# Patient Record
Sex: MALE | Race: BLACK OR AFRICAN AMERICAN | NOT HISPANIC OR LATINO | Employment: OTHER | URBAN - METROPOLITAN AREA
[De-identification: names, ages, dates, MRNs, and addresses within clinical notes are randomized per-mention and may not be internally consistent; named-entity substitution may affect disease eponyms.]

---

## 2017-08-25 ENCOUNTER — TELEPHONE (OUTPATIENT)
Dept: RHEUMATOLOGY | Facility: CLINIC | Age: 57
End: 2017-08-25

## 2018-01-15 ENCOUNTER — TELEPHONE (OUTPATIENT)
Dept: RHEUMATOLOGY | Facility: CLINIC | Age: 58
End: 2018-01-15

## 2018-01-15 ENCOUNTER — OFFICE VISIT (OUTPATIENT)
Dept: RHEUMATOLOGY | Facility: CLINIC | Age: 58
End: 2018-01-15
Payer: MEDICARE

## 2018-01-15 VITALS
HEIGHT: 68 IN | WEIGHT: 288.81 LBS | RESPIRATION RATE: 15 BRPM | SYSTOLIC BLOOD PRESSURE: 125 MMHG | BODY MASS INDEX: 43.77 KG/M2 | DIASTOLIC BLOOD PRESSURE: 88 MMHG | HEART RATE: 62 BPM

## 2018-01-15 DIAGNOSIS — Z11.4 SCREENING FOR HIV (HUMAN IMMUNODEFICIENCY VIRUS): ICD-10-CM

## 2018-01-15 DIAGNOSIS — B19.20 HEPATITIS C VIRUS INFECTION WITHOUT HEPATIC COMA, UNSPECIFIED CHRONICITY: ICD-10-CM

## 2018-01-15 DIAGNOSIS — M05.742 RHEUMATOID ARTHRITIS INVOLVING BOTH HANDS WITH POSITIVE RHEUMATOID FACTOR: Primary | ICD-10-CM

## 2018-01-15 DIAGNOSIS — R53.83 FATIGUE, UNSPECIFIED TYPE: ICD-10-CM

## 2018-01-15 DIAGNOSIS — M05.741 RHEUMATOID ARTHRITIS INVOLVING BOTH HANDS WITH POSITIVE RHEUMATOID FACTOR: Primary | ICD-10-CM

## 2018-01-15 DIAGNOSIS — M17.0 PRIMARY OSTEOARTHRITIS OF BOTH KNEES: ICD-10-CM

## 2018-01-15 DIAGNOSIS — M79.10 MYALGIA: ICD-10-CM

## 2018-01-15 DIAGNOSIS — M89.9 DISORDER OF BONE AND CARTILAGE: ICD-10-CM

## 2018-01-15 DIAGNOSIS — M94.9 DISORDER OF BONE AND CARTILAGE: ICD-10-CM

## 2018-01-15 DIAGNOSIS — A53.0 POSITIVE RPR TEST: ICD-10-CM

## 2018-01-15 PROCEDURE — 99205 OFFICE O/P NEW HI 60 MIN: CPT | Mod: S$PBB,,, | Performed by: INTERNAL MEDICINE

## 2018-01-15 PROCEDURE — 99999 PR PBB SHADOW E&M-EST. PATIENT-LVL IV: CPT | Mod: PBBFAC,,, | Performed by: INTERNAL MEDICINE

## 2018-01-15 PROCEDURE — 99214 OFFICE O/P EST MOD 30 MIN: CPT | Mod: PBBFAC,PO | Performed by: INTERNAL MEDICINE

## 2018-01-15 RX ORDER — HYDROCHLOROTHIAZIDE 25 MG/1
25 TABLET ORAL
COMMUNITY
End: 2018-08-24 | Stop reason: SDUPTHER

## 2018-01-15 RX ORDER — FOLIC ACID 1 MG/1
1 TABLET ORAL
COMMUNITY
Start: 2017-12-04 | End: 2018-01-15 | Stop reason: SDUPTHER

## 2018-01-15 RX ORDER — FOLIC ACID 1 MG/1
1 TABLET ORAL DAILY
Qty: 90 TABLET | Refills: 3 | Status: SHIPPED | OUTPATIENT
Start: 2018-01-15 | End: 2018-08-24 | Stop reason: SDUPTHER

## 2018-01-15 RX ORDER — PREDNISONE 1 MG/1
2 TABLET ORAL DAILY
Qty: 180 TABLET | Refills: 0 | Status: SHIPPED | OUTPATIENT
Start: 2018-01-15 | End: 2018-01-25

## 2018-01-15 RX ORDER — FUROSEMIDE 20 MG/1
20 TABLET ORAL
COMMUNITY

## 2018-01-15 RX ORDER — OMEPRAZOLE 20 MG/1
20 CAPSULE, DELAYED RELEASE ORAL
COMMUNITY
Start: 2017-12-04 | End: 2018-03-06 | Stop reason: SDUPTHER

## 2018-01-15 RX ORDER — SILDENAFIL 50 MG/1
50 TABLET, FILM COATED ORAL
COMMUNITY
Start: 2017-12-04

## 2018-01-15 RX ORDER — AMLODIPINE BESYLATE AND ATORVASTATIN CALCIUM 10; 40 MG/1; MG/1
1 TABLET, FILM COATED ORAL
COMMUNITY
Start: 2017-10-24 | End: 2018-10-24

## 2018-01-15 RX ORDER — AMOXICILLIN 500 MG
2 CAPSULE ORAL DAILY
COMMUNITY

## 2018-01-15 ASSESSMENT — ROUTINE ASSESSMENT OF PATIENT INDEX DATA (RAPID3)
PAIN SCORE: 4
PATIENT GLOBAL ASSESSMENT SCORE: 6
TOTAL RAPID3 SCORE: 4.55
PSYCHOLOGICAL DISTRESS SCORE: 3.3
MDHAQ FUNCTION SCORE: 1.1

## 2018-01-15 NOTE — TELEPHONE ENCOUNTER
Pt misunderstood when he was to get labs. Pt went home instead of having them done. Will call back later this week to reschedule, per pt request.

## 2018-01-15 NOTE — PROGRESS NOTES
"Subjective:       Patient ID: Massimo Funez is a 57 y.o. male.    Chief Complaint: Disease Management (RA)    HPI 58 yo male with Hepatitis C - in remission is seen in consultation for RA. RA was diagnosed in 2002 by rheumatologist. Last saw Dr Richardson in April 2017. Currently, on MTX 15mg a week +folic acid 1mg a day and prednisone 2mg a day. RA pain is stable. Today, he c/o pain in right knee, worse with activity, better with rest. No joint effusion.  Early morning stiffness lasts for 1 hr   He denies fever, weight loss, dry eyes or mouth, photosensitivity, rash, ulcer, raynaud's phenomenon, alopecia, dysphagia, diarrhea or blood in the stools  +Current occasional smoking   Review of Systems   Constitutional: Positive for fatigue. Negative for fever.   HENT: Negative for ear discharge and ear pain.    Eyes: Negative for pain and redness.   Respiratory: Negative for cough and shortness of breath.    Cardiovascular: Negative for chest pain and palpitations.   Gastrointestinal: Negative for abdominal distention and abdominal pain.   Genitourinary: Negative for genital sores and hematuria.   Musculoskeletal: Positive for myalgias.   Neurological: Negative for tremors and seizures.   Psychiatric/Behavioral: Negative for agitation and hallucinations.         Objective:   /88   Pulse 62   Resp 15   Ht 5' 8" (1.727 m)   Wt 131 kg (288 lb 12.8 oz)   BMI 43.91 kg/m²      Physical Exam   Nursing note and vitals reviewed.  Constitutional: He is oriented to person, place, and time and well-developed, well-nourished, and in no distress.   HENT:   Head: Normocephalic and atraumatic.   Eyes: Conjunctivae and EOM are normal. Pupils are equal, round, and reactive to light.   Neck: Normal range of motion. Neck supple. No thyromegaly present.   Cardiovascular: Normal rate and regular rhythm.  Exam reveals no friction rub.    Pulmonary/Chest: Effort normal and breath sounds normal.   Abdominal: Soft. Bowel sounds " are normal. He exhibits no mass.       Right Side Rheumatological Exam     Examination finds the shoulder, elbow, wrist, 1st PIP, 1st MCP, 2nd PIP, 2nd MCP, 3rd PIP, 3rd MCP, 4th PIP, 4th MCP, 5th PIP and 5th MCP normal.    The patient has an enlarged knee    Left Side Rheumatological Exam     Examination finds the shoulder, elbow, wrist, 1st PIP, 1st MCP, 2nd PIP, 2nd MCP, 3rd PIP, 3rd MCP, 4th PIP, 4th MCP, 5th PIP and 5th MCP normal.    The patient has an enlarged knee.      Neurological: He is alert and oriented to person, place, and time. He exhibits normal muscle tone.   Skin: Skin is warm and dry.     Psychiatric: Memory and affect normal.   Musculoskeletal: He exhibits no edema.   Neck with intact range of motion in all directions   Bilateral shoulders with intact ROM   Elbows without any swelling or tenderness  No swelling or tenderness in wrists, mcps, pips and dips   Bilateral hips with external rotation 25° and  internal rotation 15°   Bilateral knee crepitus  Both ankles and feet nontender, without synovitis           Obtain records from Dr Richardson  Assessment:   Seropositive rheumatoid arthritis-T FANNY 0 SJC 0  Long-term use of methotrexate  Bilateral knee OA  Myalgia rule out myositis-  Fatigue-rule out disorders of bone and cartilage/vitamin D deficiency  Plan:   Check MACIEJ, SSA, RF, CCP, ESR, CRP, Arthritis survey  Check CPK, aldolase, 25 hydroxy Vit D   Continue methotrexate 15 mg a week  Get pre-DMARD's and baseline chest x-ray and HIV for refills   Continue folic acid 1 mg daily  Wean prednisone  to 1 mg daily   Discussed quadriceps strengthening exercises  Return to clinic in 3 months with labs    Addendum-positive RPR and FTA abs.  Will need infectious disease evaluation for prescribing DMARD's.  Also needs hepatitis C viral counts

## 2018-01-16 ENCOUNTER — LAB VISIT (OUTPATIENT)
Dept: LAB | Facility: HOSPITAL | Age: 58
End: 2018-01-16
Attending: INTERNAL MEDICINE
Payer: MEDICARE

## 2018-01-16 DIAGNOSIS — M05.742 RHEUMATOID ARTHRITIS INVOLVING BOTH HANDS WITH POSITIVE RHEUMATOID FACTOR: ICD-10-CM

## 2018-01-16 DIAGNOSIS — M89.9 DISORDER OF BONE AND CARTILAGE: ICD-10-CM

## 2018-01-16 DIAGNOSIS — M05.741 RHEUMATOID ARTHRITIS INVOLVING BOTH HANDS WITH POSITIVE RHEUMATOID FACTOR: ICD-10-CM

## 2018-01-16 DIAGNOSIS — Z11.4 SCREENING FOR HIV (HUMAN IMMUNODEFICIENCY VIRUS): ICD-10-CM

## 2018-01-16 DIAGNOSIS — M94.9 DISORDER OF BONE AND CARTILAGE: ICD-10-CM

## 2018-01-16 LAB
25(OH)D3+25(OH)D2 SERPL-MCNC: 23 NG/ML
ALBUMIN SERPL BCP-MCNC: 3.9 G/DL
ALP SERPL-CCNC: 105 U/L
ALT SERPL W/O P-5'-P-CCNC: 42 U/L
ANION GAP SERPL CALC-SCNC: 11 MMOL/L
AST SERPL-CCNC: 33 U/L
BILIRUB SERPL-MCNC: 0.6 MG/DL
BUN SERPL-MCNC: 12 MG/DL
CALCIUM SERPL-MCNC: 10.4 MG/DL
CCP AB SER IA-ACNC: 20.9 U/ML
CHLORIDE SERPL-SCNC: 106 MMOL/L
CK SERPL-CCNC: 138 U/L
CO2 SERPL-SCNC: 27 MMOL/L
CREAT SERPL-MCNC: 1 MG/DL
CRP SERPL-MCNC: 14.8 MG/L
ERYTHROCYTE [SEDIMENTATION RATE] IN BLOOD BY WESTERGREN METHOD: 40 MM/HR
EST. GFR  (AFRICAN AMERICAN): >60 ML/MIN/1.73 M^2
EST. GFR  (NON AFRICAN AMERICAN): >60 ML/MIN/1.73 M^2
GLUCOSE SERPL-MCNC: 96 MG/DL
POTASSIUM SERPL-SCNC: 3.3 MMOL/L
PROT SERPL-MCNC: 8.4 G/DL
RPR SER QL: REACTIVE
RPR SER-TITR: ABNORMAL {TITER}
SODIUM SERPL-SCNC: 144 MMOL/L

## 2018-01-16 PROCEDURE — 87340 HEPATITIS B SURFACE AG IA: CPT

## 2018-01-16 PROCEDURE — 86592 SYPHILIS TEST NON-TREP QUAL: CPT

## 2018-01-16 PROCEDURE — 86706 HEP B SURFACE ANTIBODY: CPT

## 2018-01-16 PROCEDURE — 86038 ANTINUCLEAR ANTIBODIES: CPT

## 2018-01-16 PROCEDURE — 82306 VITAMIN D 25 HYDROXY: CPT

## 2018-01-16 PROCEDURE — 86140 C-REACTIVE PROTEIN: CPT

## 2018-01-16 PROCEDURE — 86235 NUCLEAR ANTIGEN ANTIBODY: CPT | Mod: 59

## 2018-01-16 PROCEDURE — 86704 HEP B CORE ANTIBODY TOTAL: CPT

## 2018-01-16 PROCEDURE — 82085 ASSAY OF ALDOLASE: CPT

## 2018-01-16 PROCEDURE — 86780 TREPONEMA PALLIDUM: CPT

## 2018-01-16 PROCEDURE — 86431 RHEUMATOID FACTOR QUANT: CPT

## 2018-01-16 PROCEDURE — 86593 SYPHILIS TEST NON-TREP QUANT: CPT

## 2018-01-16 PROCEDURE — 85651 RBC SED RATE NONAUTOMATED: CPT

## 2018-01-16 PROCEDURE — 86200 CCP ANTIBODY: CPT

## 2018-01-16 PROCEDURE — 86703 HIV-1/HIV-2 1 RESULT ANTBDY: CPT

## 2018-01-16 PROCEDURE — 36415 COLL VENOUS BLD VENIPUNCTURE: CPT

## 2018-01-16 PROCEDURE — 86803 HEPATITIS C AB TEST: CPT

## 2018-01-16 PROCEDURE — 82550 ASSAY OF CK (CPK): CPT

## 2018-01-16 PROCEDURE — 86039 ANTINUCLEAR ANTIBODIES (ANA): CPT

## 2018-01-16 PROCEDURE — 80053 COMPREHEN METABOLIC PANEL: CPT

## 2018-01-17 LAB
ANA SER QL IF: POSITIVE
ANA TITR SER IF: NORMAL {TITER}
HBV CORE AB SERPL QL IA: NEGATIVE
HBV SURFACE AB SER-ACNC: NEGATIVE M[IU]/ML
HBV SURFACE AG SERPL QL IA: NEGATIVE
HCV AB SERPL QL IA: POSITIVE
HIV 1+2 AB+HIV1 P24 AG SERPL QL IA: NEGATIVE
T PALLIDUM AB SER QL IF: REACTIVE

## 2018-01-18 LAB
ANTI SM ANTIBODY: 1.19 EU
ANTI SM/RNP ANTIBODY: 0.36 EU
ANTI-SM INTERPRETATION: NEGATIVE
ANTI-SM/RNP INTERPRETATION: NEGATIVE
ANTI-SSA ANTIBODY: 1.19 EU
ANTI-SSA ANTIBODY: 1.19 EU
ANTI-SSA INTERPRETATION: NEGATIVE
ANTI-SSA INTERPRETATION: NEGATIVE
ANTI-SSB ANTIBODY: 1.16 EU
ANTI-SSB INTERPRETATION: NEGATIVE
DSDNA AB SER-ACNC: NORMAL [IU]/ML

## 2018-01-19 LAB — RHEUMATOID FACT SERPL-ACNC: 37 IU/ML

## 2018-01-22 ENCOUNTER — HOSPITAL ENCOUNTER (OUTPATIENT)
Dept: RADIOLOGY | Facility: HOSPITAL | Age: 58
Discharge: HOME | End: 2018-01-22
Attending: INTERNAL MEDICINE
Payer: MEDICARE

## 2018-01-22 DIAGNOSIS — M05.741 RHEUMATOID ARTHRITIS INVOLVING BOTH HANDS WITH POSITIVE RHEUMATOID FACTOR: ICD-10-CM

## 2018-01-22 DIAGNOSIS — M05.742 RHEUMATOID ARTHRITIS INVOLVING BOTH HANDS WITH POSITIVE RHEUMATOID FACTOR: ICD-10-CM

## 2018-01-22 LAB — ALDOLASE SERPL-CCNC: 5.2 U/L

## 2018-01-22 PROCEDURE — 71046 X-RAY EXAM CHEST 2 VIEWS: CPT | Mod: TC,FY

## 2018-01-22 PROCEDURE — 77077 JOINT SURVEY SINGLE VIEW: CPT | Mod: 26,,, | Performed by: RADIOLOGY

## 2018-01-22 PROCEDURE — 77077 JOINT SURVEY SINGLE VIEW: CPT | Mod: TC

## 2018-01-22 PROCEDURE — 71046 X-RAY EXAM CHEST 2 VIEWS: CPT | Mod: 26,,, | Performed by: RADIOLOGY

## 2018-01-29 NOTE — TELEPHONE ENCOUNTER
----- Message from Lj Mcnair sent at 1/29/2018  8:05 AM CST -----  Contact: Massimo  Calling to advise he is out of his Rx methotrexate sodium 2.5 mg DsPk and Rx Prednisone   Also, his labs are up to date as of last Monday    [facility] [address] [facility], [place] - [address]  [address]  [place] [address]  Phone: [phone] Fax: [phone]    Please call with results [phone] (home)   Thanks!

## 2018-01-29 NOTE — TELEPHONE ENCOUNTER
----- Message from Syeda Fox MD sent at 1/29/2018  9:59 AM CST -----  Needs ID evaluation before MTX refills. SS

## 2018-01-30 ENCOUNTER — INITIAL CONSULT (OUTPATIENT)
Dept: INFECTIOUS DISEASES | Facility: CLINIC | Age: 58
End: 2018-01-30
Payer: MEDICARE

## 2018-01-30 VITALS
SYSTOLIC BLOOD PRESSURE: 148 MMHG | HEART RATE: 88 BPM | HEIGHT: 68 IN | WEIGHT: 304.44 LBS | BODY MASS INDEX: 46.14 KG/M2 | DIASTOLIC BLOOD PRESSURE: 82 MMHG

## 2018-01-30 DIAGNOSIS — A53.0 LATENT SYPHILIS IN MALE: Primary | ICD-10-CM

## 2018-01-30 PROCEDURE — 99999 PR PBB SHADOW E&M-EST. PATIENT-LVL III: CPT | Mod: PBBFAC,,, | Performed by: INTERNAL MEDICINE

## 2018-01-30 PROCEDURE — 99213 OFFICE O/P EST LOW 20 MIN: CPT | Mod: PBBFAC,PO | Performed by: INTERNAL MEDICINE

## 2018-01-30 PROCEDURE — 99202 OFFICE O/P NEW SF 15 MIN: CPT | Mod: S$PBB,,, | Performed by: INTERNAL MEDICINE

## 2018-01-30 NOTE — LETTER
February 4, 2018      Syeda Fox MD  [address]  [place] [address]           [facility]  [address] [facility] [address]  [facility] [address]  Phone: [phone]  Fax: [phone]          Patient: Massimo Funez   MR Number: [id number]   YOB: 1960   Date of Visit: 1/30/2018       Dear Dr. Syeda Fox:    Thank you for referring Massimo Funez to me for evaluation. Attached you will find relevant portions of my assessment and plan of care.    If you have questions, please do not hesitate to call me. I look forward to following Massimo Funez along with you.    Sincerely,    Josy Morales MD    Enclosure  CC:  No Recipients    If you would like to receive this communication electronically, please contact [email or web address] or [phone] to request more information on [employer] access.    For providers and/or their staff who would like to refer a patient to [facility], please contact us through our one-stop-shop provider referral line, [facility], at [phone].    If you feel you have received this communication in error or would no longer like to receive these types of communications, please e-mail [email or web address]

## 2018-01-30 NOTE — PROGRESS NOTES
Subjective:      Patient ID: Massimo Funez is a 57 y.o. male.    Chief Complaint:No chief complaint on file.      History of Present Illness  A 57-year-old man with RA, HCV, HTN, and hyperlipidemia who is referred to [facility] for positive RPR 1:1 and FTA-ABS. Mr. Funez denies any new rashes, and genital ulcers. He says he tested positive for syphilis approximately 30 years ago and that he was treated but he does not remember repeat testing thereafter. He does not have any known drug or food allergies.     Mr. Funez smokes socially when he consumes alcoholic beverages. He drinks beer every couple of months. He smokes marijuana occasionally and in the past has used cocaine. He is sexually active with women and does not use condoms consistently. He has had 2 sex partners in the past year and he estimates 30-40 lifetime sex partners.     Review of Systems   Constitution: Negative for chills, decreased appetite, fever, weakness, malaise/fatigue, night sweats, weight gain and weight loss.   HENT: Negative for congestion, ear pain, hearing loss, hoarse voice, sore throat and tinnitus.    Eyes: Negative for blurred vision, redness and visual disturbance.   Cardiovascular: Negative for chest pain, leg swelling and palpitations.   Respiratory: Negative for cough, hemoptysis, shortness of breath and sputum production.    Hematologic/Lymphatic: Negative for adenopathy. Does not bruise/bleed easily.   Skin: Negative for dry skin, itching, rash and suspicious lesions.   Musculoskeletal: Negative for back pain, joint pain, myalgias and neck pain.   Gastrointestinal: Negative for abdominal pain, constipation, diarrhea, heartburn, nausea and vomiting.   Genitourinary: Negative for dysuria, flank pain, frequency, hematuria, hesitancy and urgency.   Neurological: Negative for dizziness, headaches, numbness and paresthesias.   Psychiatric/Behavioral: Negative for depression and memory loss. The patient does not  have insomnia and is not nervous/anxious.      Objective:   Physical Exam   Constitutional: He is oriented to person, place, and time. He appears well-developed and well-nourished.   HENT:   Head: Normocephalic and atraumatic.   Right Ear: External ear normal.   Left Ear: External ear normal.   Mouth/Throat: Oropharynx is clear and moist.   Eyes: Conjunctivae and EOM are normal. Pupils are equal, round, and reactive to light.   Neck: Normal range of motion. Neck supple. No thyromegaly present.   Cardiovascular: Normal rate, regular rhythm and normal heart sounds.    No murmur heard.  Pulmonary/Chest: Effort normal and breath sounds normal. He has no wheezes. He has no rales.   Abdominal: Soft. Bowel sounds are normal. He exhibits no mass. There is no tenderness. There is no rebound.   Musculoskeletal: Normal range of motion.   Lymphadenopathy:     He has no cervical adenopathy.   Neurological: He is alert and oriented to person, place, and time.   Skin: Skin is warm and dry.   Psychiatric: He has a normal mood and affect. His behavior is normal.   Nursing note and vitals reviewed.    Assessment:       1. Latent syphilis in male        Plan:   Late latent syphilis  Suspect patient has late latent syphilis. FTA-ABS are likely positive secondary to his past exposure.   · Benzathine Pen G 2.4 million units every 7 days for 3 doses.   · RPR in 6 and 12 months.   · Patient counseled on management plan and importance of treatment.   Follow up  · RTC in 6 months for repeat RPR.

## 2018-01-31 ENCOUNTER — TELEPHONE (OUTPATIENT)
Dept: INFECTIOUS DISEASES | Facility: CLINIC | Age: 58
End: 2018-01-31

## 2018-01-31 ENCOUNTER — CLINICAL SUPPORT (OUTPATIENT)
Dept: INFECTIOUS DISEASES | Facility: CLINIC | Age: 58
End: 2018-01-31
Payer: MEDICARE

## 2018-01-31 DIAGNOSIS — A53.9 SYPHILIS: Primary | ICD-10-CM

## 2018-01-31 PROCEDURE — 96372 THER/PROPH/DIAG INJ SC/IM: CPT | Mod: PBBFAC

## 2018-01-31 RX ADMIN — PENICILLIN G BENZATHINE 2.4 MILLION UNITS: 2400000 INJECTION, SUSPENSION INTRAMUSCULAR at 01:01

## 2018-01-31 NOTE — PROGRESS NOTES
Pt received his first dose of Bicillin IM. The dose was divided between both buttocks. Pt tolerated the injections well. Return appts provided. Pt left the unit in NAD.

## 2018-01-31 NOTE — TELEPHONE ENCOUNTER
----- Message from Meg Stubbs sent at 1/31/2018  8:50 AM CST -----  Office was supposed to call and let patient know if he had to go to the [facility] to get the shot for Syphilis but he has not heard from you. Please call to advise at [phone]. He has another appointment for noon today so he needs to get this shot either before or after that.

## 2018-02-07 ENCOUNTER — CLINICAL SUPPORT (OUTPATIENT)
Dept: INFECTIOUS DISEASES | Facility: CLINIC | Age: 58
End: 2018-02-07
Payer: MEDICARE

## 2018-02-07 DIAGNOSIS — A53.9 SYPHILIS: ICD-10-CM

## 2018-02-07 PROCEDURE — 96372 THER/PROPH/DIAG INJ SC/IM: CPT | Mod: PBBFAC

## 2018-02-07 RX ADMIN — PENICILLIN G BENZATHINE 2.4 MILLION UNITS: 2400000 INJECTION, SUSPENSION INTRAMUSCULAR at 10:02

## 2018-02-07 NOTE — PROGRESS NOTES
Pt received his second dose of Bicillin IM. The dose was divided between both buttocks. Pt tolerated the injections well. Return appts provided. Pt left the unit in NAD.

## 2018-02-14 ENCOUNTER — CLINICAL SUPPORT (OUTPATIENT)
Dept: INFECTIOUS DISEASES | Facility: CLINIC | Age: 58
End: 2018-02-14
Payer: MEDICARE

## 2018-02-14 DIAGNOSIS — A53.9 SYPHILIS: ICD-10-CM

## 2018-02-14 PROCEDURE — 96372 THER/PROPH/DIAG INJ SC/IM: CPT | Mod: PBBFAC

## 2018-02-14 RX ADMIN — PENICILLIN G BENZATHINE 2.4 MILLION UNITS: 2400000 INJECTION, SUSPENSION INTRAMUSCULAR at 09:02

## 2018-02-14 NOTE — PROGRESS NOTES
Pt received his third dose of Bicillin IM. The dose was divided between both buttocks. Pt tolerated the injections well. Pt left the unit in NAD.

## 2018-03-01 RX ORDER — METHOTREXATE 2.5 MG/1
15 TABLET ORAL
Qty: 30 TABLET | Refills: 2 | Status: SHIPPED | OUTPATIENT
Start: 2018-03-01 | End: 2018-04-16 | Stop reason: SDUPTHER

## 2018-03-01 NOTE — TELEPHONE ENCOUNTER
----- Message from Vicki Tidwell sent at 3/1/2018  9:30 AM CST -----  Contact: patient  Type:  RX Refill Request    Who Called: Patient  Refill or New Rx:  Refill/ out of Refills  RX Name and Strength:  methotrexate sodium 2.5 mg DsPk  How is the patient currently taking it? (ex. 1XDay):  Take 15 mg by mouth. - Oral  Is this a 30 day or 90 day RX:  90 day  Preferred Pharmacy with phone number:    [facility] [address] [facility] [place], [place] - [address]  [address]  [facility] [address]  Phone: [phone] Fax: [phone]  Local or Mail Order:  Local  Ordering Provider:  Dr Dominique Landa  Best Call Back Number:  [phone]  Additional Information:  N/A

## 2018-03-06 RX ORDER — PREDNISONE 1 MG/1
TABLET ORAL
COMMUNITY
Start: 2018-01-29 | End: 2018-07-31

## 2018-03-06 RX ORDER — OMEPRAZOLE 20 MG/1
20 CAPSULE, DELAYED RELEASE ORAL DAILY
Qty: 30 CAPSULE | Refills: 11 | Status: ON HOLD | OUTPATIENT
Start: 2018-03-06 | End: 2019-01-12 | Stop reason: SDUPTHER

## 2018-03-06 NOTE — TELEPHONE ENCOUNTER
----- Message from Star Dexter sent at 3/6/2018 10:39 AM CST -----  Contact: Pt  Pt is calling requesting a refill on methotrexate 2.5 MG Tab and omeprazole (PRILOSEC) 20 MG capsule. Pt can be reached at [phone] or [phone] (home)     [facility] [address] [place] [address], [place] - [address]  [address]  [place] [place] [facility] [place] [address]  Phone: [phone] Fax: [phone]

## 2018-03-12 ENCOUNTER — LAB VISIT (OUTPATIENT)
Dept: LAB | Facility: HOSPITAL | Age: 58
End: 2018-03-12
Attending: INTERNAL MEDICINE
Payer: MEDICARE

## 2018-03-12 DIAGNOSIS — M05.741 RHEUMATOID ARTHRITIS INVOLVING BOTH HANDS WITH POSITIVE RHEUMATOID FACTOR: ICD-10-CM

## 2018-03-12 DIAGNOSIS — M05.742 RHEUMATOID ARTHRITIS INVOLVING BOTH HANDS WITH POSITIVE RHEUMATOID FACTOR: ICD-10-CM

## 2018-03-12 LAB
ALBUMIN SERPL BCP-MCNC: 3.5 G/DL
ALP SERPL-CCNC: 95 U/L
ALT SERPL W/O P-5'-P-CCNC: 42 U/L
ANION GAP SERPL CALC-SCNC: 8 MMOL/L
AST SERPL-CCNC: 36 U/L
BASOPHILS # BLD AUTO: 0 K/UL
BASOPHILS NFR BLD: 0.3 %
BILIRUB SERPL-MCNC: 0.7 MG/DL
BUN SERPL-MCNC: 14 MG/DL
CALCIUM SERPL-MCNC: 10.6 MG/DL
CHLORIDE SERPL-SCNC: 107 MMOL/L
CO2 SERPL-SCNC: 28 MMOL/L
CREAT SERPL-MCNC: 1 MG/DL
CRP SERPL-MCNC: 21.4 MG/L
DIFFERENTIAL METHOD: ABNORMAL
EOSINOPHIL # BLD AUTO: 0.1 K/UL
EOSINOPHIL NFR BLD: 2.5 %
ERYTHROCYTE [DISTWIDTH] IN BLOOD BY AUTOMATED COUNT: 15 %
ERYTHROCYTE [SEDIMENTATION RATE] IN BLOOD BY WESTERGREN METHOD: 42 MM/HR
EST. GFR  (AFRICAN AMERICAN): >60 ML/MIN/1.73 M^2
EST. GFR  (NON AFRICAN AMERICAN): >60 ML/MIN/1.73 M^2
GLUCOSE SERPL-MCNC: 95 MG/DL
HCT VFR BLD AUTO: 38.8 %
HGB BLD-MCNC: 13 G/DL
LYMPHOCYTES # BLD AUTO: 1.2 K/UL
LYMPHOCYTES NFR BLD: 23.9 %
MCH RBC QN AUTO: 31.1 PG
MCHC RBC AUTO-ENTMCNC: 33.5 G/DL
MCV RBC AUTO: 93 FL
MONOCYTES # BLD AUTO: 0.2 K/UL
MONOCYTES NFR BLD: 3 %
NEUTROPHILS # BLD AUTO: 3.7 K/UL
NEUTROPHILS NFR BLD: 70.3 %
PLATELET # BLD AUTO: 219 K/UL
PMV BLD AUTO: 8.2 FL
POTASSIUM SERPL-SCNC: 4.3 MMOL/L
PROT SERPL-MCNC: 7.6 G/DL
RBC # BLD AUTO: 4.18 M/UL
SODIUM SERPL-SCNC: 143 MMOL/L
WBC # BLD AUTO: 5.2 K/UL

## 2018-03-12 PROCEDURE — 85025 COMPLETE CBC W/AUTO DIFF WBC: CPT

## 2018-03-12 PROCEDURE — 86140 C-REACTIVE PROTEIN: CPT

## 2018-03-12 PROCEDURE — 80053 COMPREHEN METABOLIC PANEL: CPT

## 2018-03-12 PROCEDURE — 36415 COLL VENOUS BLD VENIPUNCTURE: CPT

## 2018-03-12 PROCEDURE — 85651 RBC SED RATE NONAUTOMATED: CPT

## 2018-04-16 ENCOUNTER — LAB VISIT (OUTPATIENT)
Dept: LAB | Facility: HOSPITAL | Age: 58
End: 2018-04-16
Attending: INTERNAL MEDICINE
Payer: MEDICARE

## 2018-04-16 ENCOUNTER — OFFICE VISIT (OUTPATIENT)
Dept: RHEUMATOLOGY | Facility: CLINIC | Age: 58
End: 2018-04-16
Payer: MEDICARE

## 2018-04-16 VITALS
HEART RATE: 84 BPM | SYSTOLIC BLOOD PRESSURE: 149 MMHG | DIASTOLIC BLOOD PRESSURE: 88 MMHG | WEIGHT: 288.13 LBS | BODY MASS INDEX: 43.67 KG/M2 | HEIGHT: 68 IN

## 2018-04-16 DIAGNOSIS — A53.0 LATENT SYPHILIS: ICD-10-CM

## 2018-04-16 DIAGNOSIS — M05.79 RHEUMATOID ARTHRITIS INVOLVING MULTIPLE SITES WITH POSITIVE RHEUMATOID FACTOR: Primary | ICD-10-CM

## 2018-04-16 DIAGNOSIS — Z79.631 METHOTREXATE, LONG TERM, CURRENT USE: ICD-10-CM

## 2018-04-16 DIAGNOSIS — M05.79 RHEUMATOID ARTHRITIS INVOLVING MULTIPLE SITES WITH POSITIVE RHEUMATOID FACTOR: ICD-10-CM

## 2018-04-16 DIAGNOSIS — B18.2 CHRONIC HEPATITIS C WITHOUT HEPATIC COMA: ICD-10-CM

## 2018-04-16 LAB
ALBUMIN SERPL BCP-MCNC: 3.8 G/DL
ALP SERPL-CCNC: 101 U/L
ALT SERPL W/O P-5'-P-CCNC: 44 U/L
ANION GAP SERPL CALC-SCNC: 8 MMOL/L
AST SERPL-CCNC: 39 U/L
BASOPHILS # BLD AUTO: 0 K/UL
BASOPHILS NFR BLD: 0.1 %
BILIRUB SERPL-MCNC: 0.8 MG/DL
BUN SERPL-MCNC: 11 MG/DL
CALCIUM SERPL-MCNC: 10.4 MG/DL
CHLORIDE SERPL-SCNC: 104 MMOL/L
CO2 SERPL-SCNC: 29 MMOL/L
CREAT SERPL-MCNC: 1 MG/DL
CRP SERPL-MCNC: 17.1 MG/L
DIFFERENTIAL METHOD: ABNORMAL
EOSINOPHIL # BLD AUTO: 0.1 K/UL
EOSINOPHIL NFR BLD: 2.1 %
ERYTHROCYTE [DISTWIDTH] IN BLOOD BY AUTOMATED COUNT: 15.9 %
ERYTHROCYTE [SEDIMENTATION RATE] IN BLOOD BY WESTERGREN METHOD: 37 MM/HR
EST. GFR  (AFRICAN AMERICAN): >60 ML/MIN/1.73 M^2
EST. GFR  (NON AFRICAN AMERICAN): >60 ML/MIN/1.73 M^2
GLUCOSE SERPL-MCNC: 95 MG/DL
HCT VFR BLD AUTO: 38.8 %
HGB BLD-MCNC: 12.9 G/DL
LYMPHOCYTES # BLD AUTO: 1.1 K/UL
LYMPHOCYTES NFR BLD: 18.5 %
MCH RBC QN AUTO: 30.7 PG
MCHC RBC AUTO-ENTMCNC: 33.2 G/DL
MCV RBC AUTO: 93 FL
MONOCYTES # BLD AUTO: 0.2 K/UL
MONOCYTES NFR BLD: 3.5 %
NEUTROPHILS # BLD AUTO: 4.7 K/UL
NEUTROPHILS NFR BLD: 75.8 %
PLATELET # BLD AUTO: 237 K/UL
PMV BLD AUTO: 8.5 FL
POTASSIUM SERPL-SCNC: 3.3 MMOL/L
PROT SERPL-MCNC: 7.9 G/DL
RBC # BLD AUTO: 4.19 M/UL
SODIUM SERPL-SCNC: 141 MMOL/L
WBC # BLD AUTO: 6.2 K/UL

## 2018-04-16 PROCEDURE — 99213 OFFICE O/P EST LOW 20 MIN: CPT | Mod: PBBFAC,PO | Performed by: INTERNAL MEDICINE

## 2018-04-16 PROCEDURE — 99999 PR PBB SHADOW E&M-EST. PATIENT-LVL III: CPT | Mod: PBBFAC,,, | Performed by: INTERNAL MEDICINE

## 2018-04-16 PROCEDURE — 36415 COLL VENOUS BLD VENIPUNCTURE: CPT

## 2018-04-16 PROCEDURE — 86140 C-REACTIVE PROTEIN: CPT

## 2018-04-16 PROCEDURE — 85025 COMPLETE CBC W/AUTO DIFF WBC: CPT

## 2018-04-16 PROCEDURE — 99214 OFFICE O/P EST MOD 30 MIN: CPT | Mod: S$PBB,,, | Performed by: INTERNAL MEDICINE

## 2018-04-16 PROCEDURE — 85651 RBC SED RATE NONAUTOMATED: CPT

## 2018-04-16 PROCEDURE — 80053 COMPREHEN METABOLIC PANEL: CPT

## 2018-04-16 RX ORDER — LOSARTAN POTASSIUM 25 MG/1
25 TABLET ORAL
COMMUNITY
Start: 2018-03-07 | End: 2018-11-29

## 2018-04-16 RX ORDER — METHOTREXATE 2.5 MG/1
15 TABLET ORAL
Qty: 30 TABLET | Refills: 2 | Status: SHIPPED | OUTPATIENT
Start: 2018-04-16 | End: 2018-12-06

## 2018-04-16 ASSESSMENT — ROUTINE ASSESSMENT OF PATIENT INDEX DATA (RAPID3)
MDHAQ FUNCTION SCORE: .9
PAIN SCORE: 8
FATIGUE SCORE: 7
PATIENT GLOBAL ASSESSMENT SCORE: 5
PSYCHOLOGICAL DISTRESS SCORE: 0
TOTAL RAPID3 SCORE: 5.33
AM STIFFNESS SCORE: 1, YES

## 2018-04-16 ASSESSMENT — DISEASE ACTIVITY SCORE (DAS28)
ESR_MM_PER_HR: 37
TENDER_JOINTS_COUNT: 0
GLOBAL_HEALTH_SCORE: 5
SWOLLEN_JOINTS_COUNT: 0
TOTAL_SCORE_ESR: 2.6

## 2018-04-16 NOTE — PROGRESS NOTES
"Subjective:       Patient ID: Massimo Funez is a 57 y.o. male.    Chief Complaint: RA   HPI 58 yo male with Hepatitis C - in remission and latent Syphilis  is here for follow-up for  RA. RA was diagnosed in 2002. Last saw Dr Richardson in April 2017. Currently, on MTX 15mg a week +folic acid 1mg a day   He has constant pain in knees sec to OA. But no new joint swelling. Grades his knee pain as 8/10, aching type, worse with activity, better with rest.  Early morning stiffness lasts for 1 hr   He denies fever, weight loss, dry eyes or mouth, photosensitivity, rash, ulcer, raynaud's phenomenon, alopecia, dysphagia, diarrhea or blood in the stools    Review of Systems   Constitutional: Negative for fever and unexpected weight change.   HENT: Negative for ear discharge and ear pain.    Eyes: Negative for pain and redness.   Respiratory: Negative for cough and shortness of breath.    Cardiovascular: Negative for chest pain and palpitations.   Gastrointestinal: Negative for abdominal distention and abdominal pain.   Genitourinary: Negative for genital sores and hematuria.   Musculoskeletal: Positive for arthralgias. Negative for joint swelling.   Skin: Negative for color change and wound.         Objective:   BP (!) 149/88 (BP Location: Left arm, Patient Position: Sitting)   Pulse 84   Ht 5' 8" (1.727 m)   Wt 130.7 kg (288 lb 2.3 oz)   BMI 43.81 kg/m²      Physical Exam   Nursing note and vitals reviewed.  Constitutional: He is oriented to person, place, and time and well-developed, well-nourished, and in no distress.   HENT:   Head: Normocephalic and atraumatic.   Eyes: Conjunctivae and EOM are normal. Pupils are equal, round, and reactive to light.   Neck: Normal range of motion. Neck supple. No thyromegaly present.   Cardiovascular: Normal rate and regular rhythm.  Exam reveals no friction rub.    Pulmonary/Chest: Effort normal and breath sounds normal.   Abdominal: Soft. Bowel sounds are normal.       Right " Side Rheumatological Exam     Examination finds the shoulder, elbow, wrist, 1st PIP, 1st MCP, 2nd PIP, 2nd MCP, 3rd PIP, 3rd MCP, 4th PIP, 4th MCP, 5th PIP and 5th MCP normal.    The patient has an enlarged knee    Left Side Rheumatological Exam     Examination finds the shoulder, elbow, wrist, 1st PIP, 1st MCP, 2nd PIP, 2nd MCP, 3rd PIP, 3rd MCP, 4th PIP, 4th MCP, 5th PIP and 5th MCP normal.    The patient has an enlarged knee.      Neurological: He is alert and oriented to person, place, and time.   Skin: Skin is warm and dry.     Psychiatric: Memory and affect normal.   Musculoskeletal: He exhibits no edema.             Lab Results   Component Value Date    WBC 6.20 04/16/2018    HGB 12.9 (L) 04/16/2018    HCT 38.8 (L) 04/16/2018    MCV 93 04/16/2018     04/16/2018     CMP  Sodium   Date Value Ref Range Status   04/16/2018 141 136 - 145 mmol/L Final     Potassium   Date Value Ref Range Status   04/16/2018 3.3 (L) 3.5 - 5.1 mmol/L Final     Chloride   Date Value Ref Range Status   04/16/2018 104 95 - 110 mmol/L Final     CO2   Date Value Ref Range Status   04/16/2018 29 23 - 29 mmol/L Final     Glucose   Date Value Ref Range Status   04/16/2018 95 70 - 110 mg/dL Final     BUN, Bld   Date Value Ref Range Status   04/16/2018 11 6 - 20 mg/dL Final     Creatinine   Date Value Ref Range Status   04/16/2018 1.0 0.5 - 1.4 mg/dL Final     Calcium   Date Value Ref Range Status   04/16/2018 10.4 8.7 - 10.5 mg/dL Final     Total Protein   Date Value Ref Range Status   04/16/2018 7.9 6.0 - 8.4 g/dL Final     Albumin   Date Value Ref Range Status   04/16/2018 3.8 3.5 - 5.2 g/dL Final     Total Bilirubin   Date Value Ref Range Status   04/16/2018 0.8 0.1 - 1.0 mg/dL Final     Comment:     For infants and newborns, interpretation of results should be based  on gestational age, weight and in agreement with clinical  observations.  Premature Infant recommended reference ranges:  Up to 24 hours.............<8.0 mg/dL  Up to  48 hours............<12.0 mg/dL  3-5 days..................<15.0 mg/dL  6-29 days.................<15.0 mg/dL       Alkaline Phosphatase   Date Value Ref Range Status   04/16/2018 101 55 - 135 U/L Final     AST   Date Value Ref Range Status   04/16/2018 39 10 - 40 U/L Final     ALT   Date Value Ref Range Status   04/16/2018 44 10 - 44 U/L Final     Anion Gap   Date Value Ref Range Status   04/16/2018 8 8 - 16 mmol/L Final     eGFR if    Date Value Ref Range Status   04/16/2018 >60 >60 mL/min/1.73 m^2 Final     eGFR if non    Date Value Ref Range Status   04/16/2018 >60 >60 mL/min/1.73 m^2 Final     Comment:     Calculation used to obtain the estimated glomerular filtration  rate (eGFR) is the CKD-EPI equation.        Lab Results   Component Value Date    SEDRATE 37 (H) 04/16/2018     Lab Results   Component Value Date    CRP 17.1 (H) 04/16/2018       Assessment:   Seropositive rheumatoid arthritis-DAS28- 2.6   Long-term use of methotrexate  Latent Syphilis- S/P Benzathine Pen G 2.4 million units every 7 days for 3 doses  Hepatitis C - in remission   Bilateral knee OA    Plan:   Continue methotrexate 15 mg a week  Discussed side effects. Advised to hold during acute infection.   Continue folic acid 1 mg daily    Return to clinic in 3 months with labs

## 2018-07-16 DIAGNOSIS — A53.0 LATENT SYPHILIS: Primary | ICD-10-CM

## 2018-07-23 ENCOUNTER — TELEPHONE (OUTPATIENT)
Dept: INFECTIOUS DISEASES | Facility: CLINIC | Age: 58
End: 2018-07-23

## 2018-07-23 NOTE — TELEPHONE ENCOUNTER
----- Message from Jose Nickerson sent at 7/23/2018 11:02 AM CDT -----  Contact: pt  He's calling in regards to rescheduling his appt in [place], [phone] (home)

## 2018-07-31 ENCOUNTER — LAB VISIT (OUTPATIENT)
Dept: LAB | Facility: HOSPITAL | Age: 58
End: 2018-07-31
Attending: INTERNAL MEDICINE
Payer: MEDICARE

## 2018-07-31 ENCOUNTER — OFFICE VISIT (OUTPATIENT)
Dept: INFECTIOUS DISEASES | Facility: CLINIC | Age: 58
End: 2018-07-31
Payer: MEDICARE

## 2018-07-31 VITALS
HEIGHT: 68 IN | DIASTOLIC BLOOD PRESSURE: 90 MMHG | WEIGHT: 283.06 LBS | HEART RATE: 95 BPM | TEMPERATURE: 98 F | BODY MASS INDEX: 42.9 KG/M2 | SYSTOLIC BLOOD PRESSURE: 147 MMHG

## 2018-07-31 DIAGNOSIS — A53.0 LATENT SYPHILIS IN MALE: ICD-10-CM

## 2018-07-31 DIAGNOSIS — A52.8 LATE LATENT SYPHILIS: Primary | ICD-10-CM

## 2018-07-31 PROCEDURE — 86592 SYPHILIS TEST NON-TREP QUAL: CPT

## 2018-07-31 PROCEDURE — 99213 OFFICE O/P EST LOW 20 MIN: CPT | Mod: PBBFAC | Performed by: PHYSICIAN ASSISTANT

## 2018-07-31 PROCEDURE — 36415 COLL VENOUS BLD VENIPUNCTURE: CPT

## 2018-07-31 PROCEDURE — 86593 SYPHILIS TEST NON-TREP QUANT: CPT

## 2018-07-31 PROCEDURE — 86780 TREPONEMA PALLIDUM: CPT

## 2018-07-31 PROCEDURE — 99999 PR PBB SHADOW E&M-EST. PATIENT-LVL III: CPT | Mod: PBBFAC,,, | Performed by: PHYSICIAN ASSISTANT

## 2018-07-31 PROCEDURE — 99213 OFFICE O/P EST LOW 20 MIN: CPT | Mod: S$PBB,,, | Performed by: PHYSICIAN ASSISTANT

## 2018-07-31 NOTE — PROGRESS NOTES
Subjective:      Patient ID: Massimo Funez is a 57 y.o. male.    Chief Complaint:syphillis      History of Present Illness    A 57-year-old man with RA, HCV, HTN, and hyperlipidemia who is seen today for latent syphilis follow up. He was seen at [facility] 1/31/18 for positive RPR 1:1 and FTA-ABS. He denies having any rashes, painless genital ulcerations. He currently has one sexual partner since his diagnosis. His partner was tested negative for syphilis. He reports testing positive several years ago ~30 years ago and unknown if he was ever treated. He completed treatment for syphilis 2/14/18 and received 3 doses once a week for 3 weeks. Denies having any fever, chills, or night sweats.        Review of Systems   Constitution: Positive for malaise/fatigue. Negative for chills, decreased appetite, fever, weakness, night sweats, weight gain and weight loss.   HENT: Negative for congestion, ear pain, hearing loss, hoarse voice, sore throat and tinnitus.    Eyes: Negative for blurred vision, redness and visual disturbance.   Cardiovascular: Negative for chest pain, leg swelling and palpitations.   Respiratory: Negative for cough, hemoptysis, shortness of breath and sputum production.    Hematologic/Lymphatic: Negative for adenopathy. Does not bruise/bleed easily.   Skin: Negative for dry skin, itching, rash and suspicious lesions.   Musculoskeletal: Negative for back pain, joint pain, myalgias and neck pain.   Gastrointestinal: Negative for abdominal pain, constipation, diarrhea, heartburn, nausea and vomiting.   Genitourinary: Negative for dysuria, flank pain, frequency, hematuria, hesitancy and urgency.   Neurological: Negative for dizziness, headaches, numbness and paresthesias.   Psychiatric/Behavioral: Negative for depression and memory loss. The patient does not have insomnia and is not nervous/anxious.      Objective:   Physical Exam   Constitutional: He is oriented to person, place, and time. He  appears well-developed and well-nourished. No distress.   HENT:   Head: Normocephalic and atraumatic.   Mouth/Throat: Oropharynx is clear and moist.   Eyes: EOM are normal. Pupils are equal, round, and reactive to light.   Neck: Normal range of motion. Neck supple.   Cardiovascular: Normal rate, regular rhythm, normal heart sounds and intact distal pulses.  Exam reveals no gallop and no friction rub.    No murmur heard.  Pulmonary/Chest: Effort normal and breath sounds normal. No respiratory distress. He has no wheezes. He has no rales. He exhibits no tenderness.   Abdominal: Bowel sounds are normal. He exhibits no distension and no mass. There is no tenderness. There is no guarding.   Musculoskeletal: Normal range of motion. He exhibits no edema or deformity.   Neurological: He is alert and oriented to person, place, and time.   Skin: Skin is warm and dry. No rash noted. He is not diaphoretic. No erythema. No pallor.   Tinea under breast folds and back  No rashes seen of extremities, chest, abdomen, back.   No oral ulcers   Psychiatric: He has a normal mood and affect. His behavior is normal. Judgment and thought content normal.   Nursing note and vitals reviewed.    Assessment:       1. Late latent syphilis          Plan:       1. Received PCN 1/31/18, 2/7/18, 2/14/18. Will check his RPR today. RPR 1:1 will not need further treatment at this time  2. Follow up as needed. Business card provided

## 2018-08-01 LAB
RPR SER QL: REACTIVE
RPR SER-TITR: ABNORMAL {TITER}

## 2018-08-02 ENCOUNTER — LAB VISIT (OUTPATIENT)
Dept: LAB | Facility: HOSPITAL | Age: 58
End: 2018-08-02
Attending: INTERNAL MEDICINE
Payer: MEDICARE

## 2018-08-02 DIAGNOSIS — M05.79 RHEUMATOID ARTHRITIS INVOLVING MULTIPLE SITES WITH POSITIVE RHEUMATOID FACTOR: ICD-10-CM

## 2018-08-02 LAB
ALBUMIN SERPL BCP-MCNC: 3.7 G/DL
ALP SERPL-CCNC: 102 U/L
ALT SERPL W/O P-5'-P-CCNC: 58 U/L
ANION GAP SERPL CALC-SCNC: 9 MMOL/L
AST SERPL-CCNC: 47 U/L
BASOPHILS # BLD AUTO: 0 K/UL
BASOPHILS NFR BLD: 0.1 %
BILIRUB SERPL-MCNC: 0.8 MG/DL
BUN SERPL-MCNC: 13 MG/DL
CALCIUM SERPL-MCNC: 10.2 MG/DL
CHLORIDE SERPL-SCNC: 103 MMOL/L
CO2 SERPL-SCNC: 29 MMOL/L
CREAT SERPL-MCNC: 1 MG/DL
CRP SERPL-MCNC: 21 MG/L
DIFFERENTIAL METHOD: ABNORMAL
EOSINOPHIL # BLD AUTO: 0.2 K/UL
EOSINOPHIL NFR BLD: 3.2 %
ERYTHROCYTE [DISTWIDTH] IN BLOOD BY AUTOMATED COUNT: 15.3 %
ERYTHROCYTE [SEDIMENTATION RATE] IN BLOOD BY WESTERGREN METHOD: 42 MM/HR
EST. GFR  (AFRICAN AMERICAN): >60 ML/MIN/1.73 M^2
EST. GFR  (NON AFRICAN AMERICAN): >60 ML/MIN/1.73 M^2
GLUCOSE SERPL-MCNC: 92 MG/DL
HCT VFR BLD AUTO: 39.7 %
HGB BLD-MCNC: 13.1 G/DL
LYMPHOCYTES # BLD AUTO: 1.3 K/UL
LYMPHOCYTES NFR BLD: 25 %
MCH RBC QN AUTO: 30.6 PG
MCHC RBC AUTO-ENTMCNC: 33 G/DL
MCV RBC AUTO: 93 FL
MONOCYTES # BLD AUTO: 0.2 K/UL
MONOCYTES NFR BLD: 3.2 %
NEUTROPHILS # BLD AUTO: 3.5 K/UL
NEUTROPHILS NFR BLD: 68.5 %
PLATELET # BLD AUTO: 215 K/UL
PMV BLD AUTO: 8 FL
POTASSIUM SERPL-SCNC: 3.4 MMOL/L
PROT SERPL-MCNC: 7.7 G/DL
RBC # BLD AUTO: 4.28 M/UL
SODIUM SERPL-SCNC: 141 MMOL/L
WBC # BLD AUTO: 5.1 K/UL

## 2018-08-02 PROCEDURE — 86140 C-REACTIVE PROTEIN: CPT

## 2018-08-02 PROCEDURE — 85651 RBC SED RATE NONAUTOMATED: CPT

## 2018-08-02 PROCEDURE — 36415 COLL VENOUS BLD VENIPUNCTURE: CPT

## 2018-08-02 PROCEDURE — 85025 COMPLETE CBC W/AUTO DIFF WBC: CPT

## 2018-08-02 PROCEDURE — 80053 COMPREHEN METABOLIC PANEL: CPT

## 2018-08-06 RX ORDER — PREDNISONE 5 MG/1
5 TABLET ORAL DAILY
Qty: 30 TABLET | Refills: 2 | Status: SHIPPED | OUTPATIENT
Start: 2018-08-06 | End: 2018-08-23

## 2018-08-07 ENCOUNTER — TELEPHONE (OUTPATIENT)
Dept: RHEUMATOLOGY | Facility: CLINIC | Age: 58
End: 2018-08-07

## 2018-08-07 NOTE — TELEPHONE ENCOUNTER
----- Message from Bita Newman sent at 8/7/2018  9:03 AM CDT -----  Contact: Patient  Type:  RX Refill Request    Who Called:  Patient  Refill or New Rx:  refill  RX Name and Strength:  predniSONE (DELTASONE) 5 MG tablet  How is the patient currently taking it? (ex. 1XDay):    Is this a 30 day or 90 day RX:    Preferred Pharmacy with phone number:  [facility] [place]  Local or Mail Order:  local  Ordering Provider:  Dr.Singla Oleary Call Back Number:  [phone]  Additional Information:  Requesting a call back when script has been sent. Remove the [facility] pharmacy off chart. Patient no longer uses that location,medication was sent to the wrong pharmacy

## 2018-08-09 LAB — T PALLIDUM AB SER QL IF: REACTIVE

## 2018-08-23 ENCOUNTER — LAB VISIT (OUTPATIENT)
Dept: LAB | Facility: HOSPITAL | Age: 58
End: 2018-08-23
Attending: INTERNAL MEDICINE
Payer: MEDICARE

## 2018-08-23 ENCOUNTER — TELEPHONE (OUTPATIENT)
Dept: RHEUMATOLOGY | Facility: CLINIC | Age: 58
End: 2018-08-23

## 2018-08-23 ENCOUNTER — OFFICE VISIT (OUTPATIENT)
Dept: RHEUMATOLOGY | Facility: CLINIC | Age: 58
End: 2018-08-23
Payer: MEDICARE

## 2018-08-23 VITALS
WEIGHT: 278 LBS | HEART RATE: 71 BPM | SYSTOLIC BLOOD PRESSURE: 145 MMHG | HEIGHT: 68 IN | DIASTOLIC BLOOD PRESSURE: 88 MMHG | BODY MASS INDEX: 42.13 KG/M2

## 2018-08-23 DIAGNOSIS — Z00.00 HEALTHCARE MAINTENANCE: ICD-10-CM

## 2018-08-23 DIAGNOSIS — B18.2 CHRONIC HEPATITIS C WITHOUT HEPATIC COMA: ICD-10-CM

## 2018-08-23 DIAGNOSIS — M05.79 RHEUMATOID ARTHRITIS INVOLVING MULTIPLE SITES WITH POSITIVE RHEUMATOID FACTOR: ICD-10-CM

## 2018-08-23 DIAGNOSIS — Z79.631 METHOTREXATE, LONG TERM, CURRENT USE: ICD-10-CM

## 2018-08-23 DIAGNOSIS — Z78.9 ALCOHOL USE: ICD-10-CM

## 2018-08-23 DIAGNOSIS — Z79.631 METHOTREXATE, LONG TERM, CURRENT USE: Primary | ICD-10-CM

## 2018-08-23 LAB
ALBUMIN SERPL BCP-MCNC: 4 G/DL
ALP SERPL-CCNC: 102 U/L
ALT SERPL W/O P-5'-P-CCNC: 58 U/L
ANION GAP SERPL CALC-SCNC: 9 MMOL/L
AST SERPL-CCNC: 42 U/L
BASOPHILS # BLD AUTO: 0 K/UL
BASOPHILS NFR BLD: 0.2 %
BILIRUB SERPL-MCNC: 0.8 MG/DL
BUN SERPL-MCNC: 13 MG/DL
CALCIUM SERPL-MCNC: 10.7 MG/DL
CHLORIDE SERPL-SCNC: 102 MMOL/L
CO2 SERPL-SCNC: 29 MMOL/L
CREAT SERPL-MCNC: 1.2 MG/DL
CRP SERPL-MCNC: 33 MG/L
DIFFERENTIAL METHOD: ABNORMAL
EOSINOPHIL # BLD AUTO: 0.1 K/UL
EOSINOPHIL NFR BLD: 0.8 %
ERYTHROCYTE [DISTWIDTH] IN BLOOD BY AUTOMATED COUNT: 14.2 %
ERYTHROCYTE [SEDIMENTATION RATE] IN BLOOD BY WESTERGREN METHOD: 53 MM/HR
EST. GFR  (AFRICAN AMERICAN): >60 ML/MIN/1.73 M^2
EST. GFR  (NON AFRICAN AMERICAN): >60 ML/MIN/1.73 M^2
GLUCOSE SERPL-MCNC: 95 MG/DL
HCT VFR BLD AUTO: 39.5 %
HGB BLD-MCNC: 13.5 G/DL
LYMPHOCYTES # BLD AUTO: 1.6 K/UL
LYMPHOCYTES NFR BLD: 22.4 %
MCH RBC QN AUTO: 31.1 PG
MCHC RBC AUTO-ENTMCNC: 34.1 G/DL
MCV RBC AUTO: 91 FL
MONOCYTES # BLD AUTO: 0.4 K/UL
MONOCYTES NFR BLD: 6.1 %
NEUTROPHILS # BLD AUTO: 4.9 K/UL
NEUTROPHILS NFR BLD: 70.5 %
PLATELET # BLD AUTO: 254 K/UL
PMV BLD AUTO: 8.5 FL
POTASSIUM SERPL-SCNC: 3.6 MMOL/L
PROT SERPL-MCNC: 8.6 G/DL
RBC # BLD AUTO: 4.34 M/UL
SODIUM SERPL-SCNC: 140 MMOL/L
WBC # BLD AUTO: 7 K/UL

## 2018-08-23 PROCEDURE — 99214 OFFICE O/P EST MOD 30 MIN: CPT | Mod: PBBFAC,PO | Performed by: INTERNAL MEDICINE

## 2018-08-23 PROCEDURE — 86140 C-REACTIVE PROTEIN: CPT

## 2018-08-23 PROCEDURE — 85025 COMPLETE CBC W/AUTO DIFF WBC: CPT

## 2018-08-23 PROCEDURE — 99214 OFFICE O/P EST MOD 30 MIN: CPT | Mod: S$PBB,,, | Performed by: INTERNAL MEDICINE

## 2018-08-23 PROCEDURE — 80053 COMPREHEN METABOLIC PANEL: CPT

## 2018-08-23 PROCEDURE — 85651 RBC SED RATE NONAUTOMATED: CPT

## 2018-08-23 PROCEDURE — 99999 PR PBB SHADOW E&M-EST. PATIENT-LVL IV: CPT | Mod: PBBFAC,,, | Performed by: INTERNAL MEDICINE

## 2018-08-23 PROCEDURE — 36415 COLL VENOUS BLD VENIPUNCTURE: CPT

## 2018-08-23 RX ORDER — PREDNISONE 10 MG/1
10 TABLET ORAL DAILY
Qty: 30 TABLET | Refills: 2 | Status: SHIPPED | OUTPATIENT
Start: 2018-08-23 | End: 2018-09-22

## 2018-08-23 ASSESSMENT — ROUTINE ASSESSMENT OF PATIENT INDEX DATA (RAPID3)
PAIN SCORE: 4
PATIENT GLOBAL ASSESSMENT SCORE: 5.5
AM STIFFNESS SCORE: 0, NO
MDHAQ FUNCTION SCORE: .8
PSYCHOLOGICAL DISTRESS SCORE: 1.1
TOTAL RAPID3 SCORE: 4.05
FATIGUE SCORE: 2

## 2018-08-23 ASSESSMENT — DISEASE ACTIVITY SCORE (DAS28)
GLOBAL_HEALTH_SCORE: 5.5
ESR_MM_PER_HR: 53
TOTAL_SCORE_ESR: 3.7
TENDER_JOINTS_COUNT: 1
SWOLLEN_JOINTS_COUNT: 1

## 2018-08-23 NOTE — PROGRESS NOTES
"Subjective:       Patient ID: Massimo Funez is a 57 y.o. male.    Chief Complaint: RA   HPI 56 yo male with Hepatitis C - in remission and latent Syphilis  is here for follow-up for  RA. RA was diagnosed in 2002. Last saw Dr Richardson in April 2017.  Blood work on 08/02/2018 revealed AST 47 and ALT 58.  Patient admitted to drinking a few beers that day.  Methotrexate is on hold at this time.  Inflammation markers are elevated.  Patient is very concerned about his abnormal LFTs and would like to follow up with hepatology.   Today, he grades his pain as 4 x 10 aching type, worse with activity, better with rest.  Early morning stiffness lasts for 1 hr   He denies fever, weight loss, dry eyes or mouth, photosensitivity, rash, ulcer, raynaud's phenomenon, alopecia, dysphagia, diarrhea or blood in the stools    Review of Systems   Constitutional: Negative for unexpected weight change.   HENT: Negative for ear discharge and ear pain.    Eyes: Negative for pain and redness.   Respiratory: Negative for cough and shortness of breath.    Cardiovascular: Negative for chest pain and palpitations.   Gastrointestinal: Negative for abdominal distention and abdominal pain.   Genitourinary: Negative for genital sores and hematuria.   Musculoskeletal: Positive for arthralgias.   Skin: Negative for color change and wound.         Objective:   BP (!) 145/88   Pulse 71   Ht 5' 8" (1.727 m)   Wt 126.1 kg (278 lb)   BMI 42.27 kg/m²      Physical Exam   Nursing note and vitals reviewed.  Constitutional: He is oriented to person, place, and time and well-developed, well-nourished, and in no distress.   HENT:   Head: Normocephalic and atraumatic.   Eyes: Conjunctivae and EOM are normal. Pupils are equal, round, and reactive to light.   Neck: Normal range of motion. Neck supple. No thyromegaly present.   Cardiovascular: Normal rate and regular rhythm.  Exam reveals no friction rub.    Pulmonary/Chest: Effort normal and breath sounds " normal.   Abdominal: Soft. Bowel sounds are normal.       Right Side Rheumatological Exam     Examination finds the shoulder, elbow, wrist, 1st PIP, 1st MCP, 2nd PIP, 2nd MCP, 3rd PIP, 3rd MCP, 4th PIP, 4th MCP, 5th PIP and 5th MCP normal.    The patient is tender to palpation of the knee    He has swelling of the knee    The patient has an enlarged knee    Left Side Rheumatological Exam     Examination finds the shoulder, elbow, wrist, 1st PIP, 1st MCP, 2nd PIP, 2nd MCP, 3rd PIP, 3rd MCP, 4th PIP, 4th MCP, 5th PIP and 5th MCP normal.    The patient is tender to palpation of the ankle.    He has swelling of the knee    The patient has an enlarged knee.      Neurological: He is alert and oriented to person, place, and time.   Skin: Skin is warm and dry.     Psychiatric: Memory and affect normal.   Musculoskeletal: He exhibits no edema.             Lab Results   Component Value Date    WBC 7.00 08/23/2018    HGB 13.5 (L) 08/23/2018    HCT 39.5 (L) 08/23/2018    MCV 91 08/23/2018     08/23/2018     CMP  Sodium   Date Value Ref Range Status   08/23/2018 140 136 - 145 mmol/L Final     Potassium   Date Value Ref Range Status   08/23/2018 3.6 3.5 - 5.1 mmol/L Final     Chloride   Date Value Ref Range Status   08/23/2018 102 95 - 110 mmol/L Final     CO2   Date Value Ref Range Status   08/23/2018 29 23 - 29 mmol/L Final     Glucose   Date Value Ref Range Status   08/23/2018 95 70 - 110 mg/dL Final     BUN, Bld   Date Value Ref Range Status   08/23/2018 13 6 - 20 mg/dL Final     Creatinine   Date Value Ref Range Status   08/23/2018 1.2 0.5 - 1.4 mg/dL Final     Calcium   Date Value Ref Range Status   08/23/2018 10.7 (H) 8.7 - 10.5 mg/dL Final     Total Protein   Date Value Ref Range Status   08/23/2018 8.6 (H) 6.0 - 8.4 g/dL Final     Albumin   Date Value Ref Range Status   08/23/2018 4.0 3.5 - 5.2 g/dL Final     Total Bilirubin   Date Value Ref Range Status   08/23/2018 0.8 0.1 - 1.0 mg/dL Final     Comment:     For  infants and newborns, interpretation of results should be based  on gestational age, weight and in agreement with clinical  observations.  Premature Infant recommended reference ranges:  Up to 24 hours.............<8.0 mg/dL  Up to 48 hours............<12.0 mg/dL  3-5 days..................<15.0 mg/dL  6-29 days.................<15.0 mg/dL       Alkaline Phosphatase   Date Value Ref Range Status   08/23/2018 102 55 - 135 U/L Final     AST   Date Value Ref Range Status   08/23/2018 42 (H) 10 - 40 U/L Final     ALT   Date Value Ref Range Status   08/23/2018 58 (H) 10 - 44 U/L Final     Anion Gap   Date Value Ref Range Status   08/23/2018 9 8 - 16 mmol/L Final     eGFR if    Date Value Ref Range Status   08/23/2018 >60 >60 mL/min/1.73 m^2 Final     eGFR if non    Date Value Ref Range Status   08/23/2018 >60 >60 mL/min/1.73 m^2 Final     Comment:     Calculation used to obtain the estimated glomerular filtration  rate (eGFR) is the CKD-EPI equation.        Lab Results   Component Value Date    SEDRATE 42 (H) 08/02/2018     Lab Results   Component Value Date    CRP 33.0 (H) 08/23/2018       Assessment:   Seropositive rheumatoid arthritis-DAS28- 3.7.  Increase prednisone to10 mg daily.  Methotrexate on hold.  Plan is to start Etanercept for active RA  Latent Syphilis- S/P Benzathine Pen G 2.4 million units every 7 days for 3 doses  Hepatitis C /abnormal LFTs/ALBARADO- Admitted to alcohol use with MTX- hepatology referral  Healthcare maintenance-internal medicine referral  Bilateral knee OA  Alcohol use  Plan:   Increase prednisone to 10 mg daily   Discussed Etanercept for treatment of active rheumatoid arthritis.  Discussed side effects, handout provided.  Patient would like to follow up at [facility] (closer to his home) for further management    Repeat LFTs today  Hepatology referral for evaluation of abnormal LFTs  Internal medicine referral for health care maintenance  Counseled to avoid  alcohol

## 2018-08-24 ENCOUNTER — OFFICE VISIT (OUTPATIENT)
Dept: INTERNAL MEDICINE | Facility: CLINIC | Age: 58
End: 2018-08-24
Payer: MEDICARE

## 2018-08-24 VITALS
BODY MASS INDEX: 41.52 KG/M2 | HEIGHT: 68 IN | WEIGHT: 274 LBS | SYSTOLIC BLOOD PRESSURE: 130 MMHG | OXYGEN SATURATION: 98 % | DIASTOLIC BLOOD PRESSURE: 78 MMHG | HEART RATE: 71 BPM

## 2018-08-24 DIAGNOSIS — E66.01 MORBID OBESITY WITH BMI OF 40.0-44.9, ADULT: ICD-10-CM

## 2018-08-24 DIAGNOSIS — G89.29 CHRONIC PAIN OF BOTH KNEES: ICD-10-CM

## 2018-08-24 DIAGNOSIS — M25.562 CHRONIC PAIN OF BOTH KNEES: ICD-10-CM

## 2018-08-24 DIAGNOSIS — M05.79 RHEUMATOID ARTHRITIS INVOLVING MULTIPLE SITES WITH POSITIVE RHEUMATOID FACTOR: ICD-10-CM

## 2018-08-24 DIAGNOSIS — M05.742 RHEUMATOID ARTHRITIS INVOLVING BOTH HANDS WITH POSITIVE RHEUMATOID FACTOR: ICD-10-CM

## 2018-08-24 DIAGNOSIS — M05.741 RHEUMATOID ARTHRITIS INVOLVING BOTH HANDS WITH POSITIVE RHEUMATOID FACTOR: ICD-10-CM

## 2018-08-24 DIAGNOSIS — Z79.631 METHOTREXATE, LONG TERM, CURRENT USE: ICD-10-CM

## 2018-08-24 DIAGNOSIS — E78.5 HYPERLIPIDEMIA, UNSPECIFIED HYPERLIPIDEMIA TYPE: ICD-10-CM

## 2018-08-24 DIAGNOSIS — I10 HYPERTENSION, UNSPECIFIED TYPE: ICD-10-CM

## 2018-08-24 DIAGNOSIS — M25.561 CHRONIC PAIN OF BOTH KNEES: ICD-10-CM

## 2018-08-24 DIAGNOSIS — M19.90 OSTEOARTHRITIS, UNSPECIFIED OSTEOARTHRITIS TYPE, UNSPECIFIED SITE: ICD-10-CM

## 2018-08-24 DIAGNOSIS — Z00.00 ANNUAL PHYSICAL EXAM: Primary | ICD-10-CM

## 2018-08-24 DIAGNOSIS — K59.00 CONSTIPATION, UNSPECIFIED CONSTIPATION TYPE: ICD-10-CM

## 2018-08-24 DIAGNOSIS — R14.0 ABDOMINAL BLOATING: ICD-10-CM

## 2018-08-24 PROCEDURE — 99204 OFFICE O/P NEW MOD 45 MIN: CPT | Mod: S$PBB,,, | Performed by: FAMILY MEDICINE

## 2018-08-24 PROCEDURE — 99999 PR PBB SHADOW E&M-EST. PATIENT-LVL III: CPT | Mod: PBBFAC,,, | Performed by: FAMILY MEDICINE

## 2018-08-24 PROCEDURE — 99213 OFFICE O/P EST LOW 20 MIN: CPT | Mod: PBBFAC | Performed by: FAMILY MEDICINE

## 2018-08-24 RX ORDER — HYDROCHLOROTHIAZIDE 25 MG/1
25 TABLET ORAL DAILY
Qty: 90 TABLET | Refills: 3 | Status: ON HOLD | OUTPATIENT
Start: 2018-08-24 | End: 2019-01-12 | Stop reason: HOSPADM

## 2018-08-24 RX ORDER — FOLIC ACID 1 MG/1
1 TABLET ORAL DAILY
Qty: 90 TABLET | Refills: 3 | Status: SHIPPED | OUTPATIENT
Start: 2018-08-24 | End: 2018-12-06

## 2018-08-24 NOTE — PROGRESS NOTES
Subjective:      Patient ID: Massimo Funez is a 57 y.o. male.    Chief Complaint: Establish Care      HPI:  Massimo Funez is a 57 year old male with chronic hepatitis C infection, history of latent syphilis--treated, hypertension, hyperlipidemia, obesity, rheumatoid arthritis, osteoarthritis, nephrolithiasis, and bilateral knee pain who presents today to establish care.    Rheumatoid arthritis:  Followed by Dr. Fox, rheumatology.  Regimen was recently changed due to elevated ESR/CRP.  Methotrexate discontinued and dosage of prednisone increased.  Symptoms stable.  Discussed Etanercept at last visit with rheumatology.      Abdominal bloating, constipation:  Endorses history of abdominal bloating for 2 days last week with associated constipation.  Initially thought he may be passing a kidney stone which he has had in the past due to associated nausea and discomfort in the genital region.  States he never did pass a stone and denies associated hematuria, decreased urinary flow, dysuria, or difficulty initiating the urinary stream.  States he was able to pass some bowel movements subsequently with improvement in his abdominal bloating.  States his constipation has improved but is still present, although he does state he passed 2 regular bowel movements this morning--usually passes 1 BM per day in normal state of health.  Did not require medication for this issue.    Hypertension:  Taking amlodipine 10 mg by mouth daily and HCTZ 25 mg by mouth daily.  Does not monitor his sodium intake.  Does not exercise.  Denies associated headaches or vision changes.  Requests refill on HCTZ.    Hyperlipidemia:  Taking atorvastatin 40 mg by mouth daily; this was increased from 20 mg around Dec. 2017 due to elevated cholesterol values.    Chronic Hepatitis C:  Diagnosed in 2002.  Follows with hepatology.  Previously had a biopsy and was told there was no presence of virus at that time.    Latent syphilis:  Treated with PCN  per ID.    Health Care Maintenance:  Last labs:  Dec. 2017  Last tetanus booster:  Unsure of date but thinks he has had this within the last 10 years  Last colonoscopy:  2016, 2 polyps reported per patient  Last influenza vaccination:  Dec. 2017      Past Medical History:   Diagnosis Date    Hyperlipidemia     Hypertension     Rheumatoid arthritis involving multiple sites with positive rheumatoid factor 4/16/2018       History reviewed. No pertinent surgical history.    Family History   Problem Relation Age of Onset    Diabetes Mellitus Mother     Inflammatory bowel disease Mother     Osteoarthritis Mother     Hypertension Father     Stroke Father     Cancer Neg Hx     Heart disease Neg Hx     Hyperlipidemia Neg Hx     Lupus Neg Hx     Kidney disease Neg Hx     Thyroid disease Neg Hx     Rheum arthritis Neg Hx     Psoriasis Neg Hx        Social History     Socioeconomic History    Marital status: Legally      Spouse name: None    Number of children: None    Years of education: None    Highest education level: None   Social Needs    Financial resource strain: None    Food insecurity - worry: None    Food insecurity - inability: None    Transportation needs - medical: None    Transportation needs - non-medical: None   Occupational History    None   Tobacco Use    Smoking status: Former Smoker    Smokeless tobacco: Never Used   Substance and Sexual Activity    Alcohol use: Yes    Drug use: No    Sexual activity: Yes   Other Topics Concern    None   Social History Narrative    None       Review of Systems   Constitutional: Negative for chills, fatigue and fever.   HENT: Negative for congestion, hearing loss, nosebleeds, rhinorrhea, sore throat and trouble swallowing.    Eyes: Negative for pain and visual disturbance.   Respiratory: Negative for cough, shortness of breath and wheezing.    Cardiovascular: Negative for chest pain and palpitations.   Gastrointestinal: Positive for  "constipation. Negative for abdominal distention, abdominal pain, diarrhea, nausea and vomiting.   Genitourinary: Negative for difficulty urinating, dysuria, frequency, hematuria and urgency.   Musculoskeletal: Positive for arthralgias. Negative for myalgias.   Skin: Negative for color change and rash.   Neurological: Negative for dizziness, syncope, speech difficulty, weakness, numbness and headaches.   Psychiatric/Behavioral: Negative for agitation, behavioral problems and confusion. The patient is not nervous/anxious.      Objective:     Vitals:    08/24/18 1103   BP: 130/78   BP Location: Left arm   Patient Position: Sitting   BP Method: Large (Manual)   Pulse: 71   SpO2: 98%   Weight: 124.3 kg (274 lb)   Height: 5' 8" (1.727 m)       Physical Exam   Constitutional: He appears well-developed and well-nourished. He is cooperative. No distress.   obese   HENT:   Head: Normocephalic and atraumatic.   Right Ear: Hearing and external ear normal.   Left Ear: Hearing and external ear normal.   Nose: Nose normal. No rhinorrhea. No epistaxis.   Mouth/Throat: Oropharynx is clear and moist and mucous membranes are normal. No oral lesions.   Eyes: Conjunctivae, EOM and lids are normal. Pupils are equal, round, and reactive to light. Right eye exhibits no discharge. Left eye exhibits no discharge.   Neck: Trachea normal and normal range of motion. Neck supple. No tracheal deviation present.   Cardiovascular: Normal rate, regular rhythm and normal heart sounds. Exam reveals no gallop and no friction rub.   No murmur heard.  Pulmonary/Chest: Effort normal and breath sounds normal. No respiratory distress. He has no wheezes. He has no rales.   Abdominal: Soft. Bowel sounds are normal. He exhibits no distension. There is no tenderness. There is no rebound and no guarding.   Musculoskeletal: Normal range of motion. He exhibits no edema or deformity.   Lymphadenopathy:     He has no cervical adenopathy.   Neurological: He is alert. " No cranial nerve deficit. He exhibits normal muscle tone.   Skin: Skin is warm and dry. No rash noted.   Psychiatric: He has a normal mood and affect. His speech is normal and behavior is normal. Judgment and thought content normal. Cognition and memory are normal.   Nursing note and vitals reviewed.     Assessment:      1. Annual physical exam    2. Rheumatoid arthritis involving multiple sites with positive rheumatoid factor    3. Methotrexate, long term, current use    4. Hypertension, unspecified type    5. Chronic pain of both knees    6. Osteoarthritis, unspecified osteoarthritis type, unspecified site    7. Abdominal bloating    8. Constipation, unspecified constipation type    9. Hyperlipidemia, unspecified hyperlipidemia type    10. Rheumatoid arthritis involving both hands with positive rheumatoid factor    11. Morbid obesity with BMI of 40.0-44.9, adult      Plan:   Massimo was seen today for establish care.    Diagnoses and all orders for this visit:    Annual physical exam        -      Release of records form completed by patient to attempt to obtain records from patient's previous PCP Dr. Pulido ([place], [place]).  Reports routine blood work done in Dec. 2017.  Colonoscopy 2016, though patient thinks this may need to be repeated this year; will refer for repeat colonoscopy after review of previous report.    Rheumatoid arthritis involving multiple sites with positive rheumatoid factor        -      Symptoms stable, though worsened inflammatory markers noted recently.  Continue current regimen and regular follow up with rheumatology.    Methotrexate, long term, current use  -     folic acid (FOLVITE) 1 MG tablet; Take 1 tablet (1 mg total) by mouth once daily, refilled.    Hypertension, unspecified type  -     hydroCHLOROthiazide (HYDRODIURIL) 25 MG tablet; Take 1 tablet (25 mg total) by mouth once daily; refilled.  Counseled patient on the importance of a low sodium diet, increased daily aerobic exercise,  and weight loss.  Continue current regimen.    Chronic pain of both knees        -    States he takes Tylenol arthritis PRN; continue current regimen.  Recommended patient return to clinic in 4-6 weeks if worsening or not relieved with current measures.  Consider imaging vs. PT referral vs. Steroid injection at that time.    Osteoarthritis, unspecified osteoarthritis type, unspecified site    Abdominal bloating        -     Resolved; continue to monitor.  Instructed patient to return to clinic if he develops any hematuria, urinary changes, fevers/chills, or if symptoms worsening.    Constipation, unspecified constipation type        -     Had 2 regular BMs this morning.  Continue to monitor.  Recommended fiber supplementation.    Hyperlipidemia, unspecified hyperlipidemia type    Rheumatoid arthritis involving both hands with positive rheumatoid factor  -     folic acid (FOLVITE) 1 MG tablet; Take 1 tablet (1 mg total) by mouth once daily.    Morbid obesity with BMI of 40.0-44.9, adult        -     Continue statin.

## 2018-08-24 NOTE — LETTER
August 24, 2018      Syeda Fox MD  [address] [place] [address]           [facility]  [address]  [facility] [address]  Phone: [phone]  Fax: [phone]          Patient: Massimo Funez   MR Number: [id number]   YOB: 1960   Date of Visit: 8/24/2018       Dear Dr. Syeda Fxo:    Thank you for referring Massimo Funez to me for evaluation. Attached you will find relevant portions of my assessment and plan of care.    If you have questions, please do not hesitate to call me. I look forward to following Massimo Funez along with you.    Sincerely,    Corey Kwok MD    Enclosure  CC:  No Recipients    If you would like to receive this communication electronically, please contact [email or web address] or [phone] to request more information on [facility] access.    For providers and/or their staff who would like to refer a patient to [facility], please contact us through our one-stop-shop provider referral line, [facility], at [phone].    If you feel you have received this communication in error or would no longer like to receive these types of communications, please e-mail [email or web address]

## 2018-08-30 ENCOUNTER — DOCUMENTATION ONLY (OUTPATIENT)
Dept: TRANSPLANT | Facility: CLINIC | Age: 58
End: 2018-08-30

## 2018-08-30 NOTE — LETTER
August 30, 2018    Massimo Funez  [address] [facility] [address]      Dear Massimo Funez:    Your doctor has referred you to the [facility]. You will be contacted by our office and an initial appointment will then be scheduled for you.    We look forward to seeing you soon. If you have any further questions, please contact us at [phone].       Sincerely,        [facility]   [facility] [place], [place] [id number] [phone]

## 2018-08-30 NOTE — NURSING
Pt records reviewed.   Pt will be referred to Hepatitis C clinic    Initial referral received  from the [facility].   Referring Provider/diagnosis  OSVALDO PAULA Provider:   Diagnosis: Chronic hepatitis C without hepatic coma         Referral letter sent to  patient.

## 2018-11-29 ENCOUNTER — TELEPHONE (OUTPATIENT)
Dept: PHARMACY | Facility: CLINIC | Age: 58
End: 2018-11-29

## 2018-11-29 ENCOUNTER — OFFICE VISIT (OUTPATIENT)
Dept: RHEUMATOLOGY | Facility: CLINIC | Age: 58
End: 2018-11-29
Payer: MEDICARE

## 2018-11-29 VITALS
HEIGHT: 68 IN | BODY MASS INDEX: 41.66 KG/M2 | HEART RATE: 63 BPM | DIASTOLIC BLOOD PRESSURE: 89 MMHG | SYSTOLIC BLOOD PRESSURE: 132 MMHG

## 2018-11-29 DIAGNOSIS — M05.79 RHEUMATOID ARTHRITIS INVOLVING MULTIPLE SITES WITH POSITIVE RHEUMATOID FACTOR: ICD-10-CM

## 2018-11-29 DIAGNOSIS — R74.01 TRANSAMINITIS: ICD-10-CM

## 2018-11-29 DIAGNOSIS — M06.9 RHEUMATOID ARTHRITIS INVOLVING MULTIPLE JOINTS: Primary | ICD-10-CM

## 2018-11-29 DIAGNOSIS — Z79.899 ENCOUNTER FOR LONG-TERM (CURRENT) USE OF HIGH-RISK MEDICATION: ICD-10-CM

## 2018-11-29 PROCEDURE — 99213 OFFICE O/P EST LOW 20 MIN: CPT | Mod: PBBFAC | Performed by: INTERNAL MEDICINE

## 2018-11-29 PROCEDURE — 99214 OFFICE O/P EST MOD 30 MIN: CPT | Mod: S$PBB,,, | Performed by: INTERNAL MEDICINE

## 2018-11-29 PROCEDURE — 99999 PR PBB SHADOW E&M-EST. PATIENT-LVL III: CPT | Mod: PBBFAC,,, | Performed by: INTERNAL MEDICINE

## 2018-11-29 RX ORDER — PREDNISONE 10 MG/1
5 TABLET ORAL DAILY
COMMUNITY
Start: 2018-10-25

## 2018-11-29 RX ORDER — AMLODIPINE BESYLATE AND ATORVASTATIN CALCIUM 10; 40 MG/1; MG/1
1 TABLET, FILM COATED ORAL DAILY
COMMUNITY
Start: 2018-11-19 | End: 2019-05-15 | Stop reason: SDUPTHER

## 2018-11-29 RX ORDER — PREDNISONE 5 MG/1
10 TABLET ORAL DAILY
Qty: 60 TABLET | Refills: 3 | Status: SHIPPED | OUTPATIENT
Start: 2018-11-29 | End: 2018-12-06

## 2018-11-29 RX ORDER — METHOTREXATE 2.5 MG/1
15 TABLET ORAL
COMMUNITY
Start: 2018-02-27 | End: 2018-12-06

## 2018-11-29 NOTE — PROGRESS NOTES
Subjective:       Patient ID: Massimo Funez is a 58 y.o. male.    Chief Complaint: No chief complaint on file.    HPI 58 year old with PMH of HTN, HL, hepatitis C ( diagnosed in 2002 never required treatment), latent syphilis, RA (diagnosed in 2002), ALBARADO here for evaluation. In 2002, he developed pain and swelling of both knees.  Then it went into hands, shoulders, and wrists. He was started on plaquenil and prednisone initially and was then put on MTX. He has been off and on MTX. He was taking MTX  6 pills a week and then it stopped due to elevated LFTS in august. Reports he is taking prednisone 10mg a day.  He reports without prednisone, he is not able to function. He reports lower back pain in last year. Pain level in lower back can be as high as 4/10. ]  Pain level today is 2/10. He reports on occasion having left ankle swelling and left foot swelling. Denies swelling.  Denies oral ulcers, fevers, oral ulcers, raynauds, or pleurisy.      Past Medical History:   Diagnosis Date    Hyperlipidemia     Hypertension     Rheumatoid arthritis involving multiple sites with positive rheumatoid factor 4/16/2018       Review of Systems   Constitutional: Negative for activity change, appetite change, chills, diaphoresis, fatigue and fever.   HENT: Negative for ear discharge, ear pain, hearing loss, mouth sores, nosebleeds and sinus pressure.    Eyes: Negative.  Negative for photophobia, pain, discharge, redness and itching.   Respiratory: Negative for cough, chest tightness and shortness of breath.    Cardiovascular: Negative for chest pain, palpitations and leg swelling.   Gastrointestinal: Negative for abdominal distention, blood in stool and nausea.   Endocrine: Negative for cold intolerance, heat intolerance, polydipsia and polyphagia.   Genitourinary: Negative for flank pain, genital sores and hematuria.   Musculoskeletal: Positive for arthralgias and joint swelling. Negative for back pain, gait problem,  "myalgias, neck pain and neck stiffness.   Skin: Negative for color change, pallor and rash.   Neurological: Negative for dizziness, weakness, light-headedness and headaches.   Hematological: Negative for adenopathy. Does not bruise/bleed easily.   Psychiatric/Behavioral: Negative for decreased concentration and hallucinations. The patient is not nervous/anxious.              Objective:   /89   Pulse 63   Ht 5' 8" (1.727 m)   BMI 41.66 kg/m²      Physical Exam   Constitutional: He is oriented to person, place, and time and well-developed, well-nourished, and in no distress. No distress.   HENT:   Head: Normocephalic and atraumatic.   Right Ear: External ear normal.   Left Ear: External ear normal.   Eyes: Conjunctivae are normal. Pupils are equal, round, and reactive to light.   Neck: Normal range of motion. Neck supple. No JVD present. No tracheal deviation present. No thyromegaly present.   Cardiovascular: Normal rate, regular rhythm, normal heart sounds and intact distal pulses.  Exam reveals no gallop and no friction rub.    No murmur heard.  Pulmonary/Chest: Effort normal and breath sounds normal. No stridor. No respiratory distress. He has no wheezes. He has no rales. He exhibits no tenderness.   Abdominal: Soft. Bowel sounds are normal. He exhibits no distension and no mass. There is no tenderness. There is no rebound and no guarding.   Lymphadenopathy:     He has no cervical adenopathy.   Neurological: He is alert and oriented to person, place, and time. No cranial nerve deficit. Gait normal. Coordination normal.   Skin: Skin is warm and dry. No rash noted. He is not diaphoretic. No erythema. No pallor.     Musculoskeletal: Normal range of motion. He exhibits no edema, tenderness or deformity.   FROM of all joints including neck, shoulders, spine, ankles, wrists, knees, and ankles; no joint deformities noted or effusions, erythema or warmth; no tophi or nodules noted; no crepitus; no synovitis noted " in hands or feet; no nail pitting or onycholysis                Labs: reviewed  ccp-21  rf-37  Mariana-+      Arthritis survey (2018): I personally reviewed;  Erosive or inflammatory arthritis is not identified. There is moderate osteoarthritis at both knees and mild arthritis noted at the mid tarsal joints and at the carpal joints of both wrists. Spurs and osteophytes are noted in the cervical spine especially at C4-5 anteriorly.    A/p:58 year old with PMH of HTN, HL, hepatitis C ( diagnosed in 2002 never required treatment), latent syphilis, RA (diagnosed in 2002), ALBARADO here for evaluation.  Patient previously followed by Dr. Fox.  Was on MTX but was stopped due to transaminitis.  Patient currently on prednisone 10mg a day in order for him to function.  Per patient request, will send him to hepatology.    -hepatology consult for evaluation of transaminitis  - pred-dmard clinic consult  Start enbrel 50mg sub q once  Week (Risks of TNF inhibitor discussed with patient and not limited to cell count abnormalities, malignancy, allergic  reaction to medication, and increased risk of infection. Patient agrees with starting medication.) Hand out given on medication  Labs in 6 weeks  Continue prednisone 10mg a day (told patient the plan will be to wean off steroids once he feels better on enbrel)   rtc in 8 weeks  Encouraged smoking cessation     No diagnosis found.        *

## 2018-12-03 ENCOUNTER — TELEPHONE (OUTPATIENT)
Dept: PHARMACY | Facility: CLINIC | Age: 58
End: 2018-12-03

## 2018-12-03 NOTE — TELEPHONE ENCOUNTER
Initial Enbrel Sureclick 50mg/ml Injection consult completed on 12/3. Patient would like a call back after his ID appt to schedule delivery. Confirmed 2 patient identifiers - name and . Therapy Appropriate.    Counseled patient on administration directions:  -  Inject Enbrel 50mg into the skin every 7 days.  .- Take out of the refrigerator 30-60 minutes prior to injection.  - Wash hands before and after injection.  - Monthly RX will come with gauze, bandaids, and alcohol swabs.  - Patient may inject in either the tops of the thighs, abdomen- but at least 2 inches away from his belly button, or the outer part of his upper arm.  Patient was instructed to rotate injections sites.  - Patient is to wipe down the injection site with the alcohol pad, wait to dry.  Gently squeeze the area of the cleaned skin and hold it firmly.   Place the pen flat against the raised area of skin that is being squeezed, then push down on the button and hold for 10-15 seconds, until the window has gone from clear to yellow.  - Patient should rotate injection sites.   - Patient will use sharps container; once full, per [place] law, he may lock the sharps container and place in his trash. He can then contact the Pharmacy and we will replace the sharps at no additional charge.    Patient was counseled on possible side effects:  - Injection site reaction: redness, soreness, itching, bruising, which should resolve within 3-5 days.  - Increased risk for infections.  If patient becomes sick, patient is to hold Enbrel use until he is better.     DDIs:  Medication list reviewed and potential DDIs addressed.    Patient verbalized understanding. Compliance stressed. Patient advised to keep a calendar marking dates of injections to ensure better compliance. Patient advised to call myself or provider should any questions arise. Consultation included: indication; goals of treatment; administration; storage and handling; side effects; how to handle side  effects; the importance of compliance; how to handle missed doses; the importance of laboratory monitoring; the importance of keeping all follow up appointments.  Patient understands to report any medication changes to [facility] and provider. All questions answered and addressed to patients satisfaction. I will f/u with her in 1 week from start, [facility] to contact patient in 3 weeks for refills.

## 2018-12-03 NOTE — TELEPHONE ENCOUNTER
DOCUMENTATION ONLY:  Prior authorization for Enbrel Sureclick approved from 12/30/2017 to 12/31/2019.  Case ID# [id number]    Co-pay: $8.35    Patient Assistance IS NOT required.     Forward to clinical pharmacist for consult & shipment.

## 2018-12-06 ENCOUNTER — OFFICE VISIT (OUTPATIENT)
Dept: HEPATOLOGY | Facility: CLINIC | Age: 58
End: 2018-12-06
Payer: MEDICARE

## 2018-12-06 ENCOUNTER — PROCEDURE VISIT (OUTPATIENT)
Dept: HEPATOLOGY | Facility: CLINIC | Age: 58
End: 2018-12-06
Attending: NURSE PRACTITIONER
Payer: MEDICARE

## 2018-12-06 VITALS
DIASTOLIC BLOOD PRESSURE: 77 MMHG | WEIGHT: 297.19 LBS | SYSTOLIC BLOOD PRESSURE: 144 MMHG | OXYGEN SATURATION: 97 % | HEIGHT: 69 IN | HEART RATE: 73 BPM | BODY MASS INDEX: 44.02 KG/M2

## 2018-12-06 DIAGNOSIS — M05.79 RHEUMATOID ARTHRITIS INVOLVING MULTIPLE SITES WITH POSITIVE RHEUMATOID FACTOR: ICD-10-CM

## 2018-12-06 DIAGNOSIS — Z92.21 HISTORY OF METHOTREXATE THERAPY: ICD-10-CM

## 2018-12-06 DIAGNOSIS — R74.8 ELEVATED LIVER ENZYMES: ICD-10-CM

## 2018-12-06 DIAGNOSIS — R76.8 POSITIVE HEPATITIS C ANTIBODY TEST: ICD-10-CM

## 2018-12-06 DIAGNOSIS — R74.8 ELEVATED LIVER ENZYMES: Primary | ICD-10-CM

## 2018-12-06 DIAGNOSIS — K76.0 FATTY LIVER: ICD-10-CM

## 2018-12-06 PROBLEM — B18.2 CHRONIC HEPATITIS C WITHOUT HEPATIC COMA: Status: RESOLVED | Noted: 2018-04-16 | Resolved: 2018-12-06

## 2018-12-06 PROBLEM — Z79.631 METHOTREXATE, LONG TERM, CURRENT USE: Status: RESOLVED | Noted: 2018-04-16 | Resolved: 2018-12-06

## 2018-12-06 PROCEDURE — 99214 OFFICE O/P EST MOD 30 MIN: CPT | Mod: PBBFAC | Performed by: NURSE PRACTITIONER

## 2018-12-06 PROCEDURE — 99999 PR PBB SHADOW E&M-EST. PATIENT-LVL IV: CPT | Mod: PBBFAC,,, | Performed by: NURSE PRACTITIONER

## 2018-12-06 PROCEDURE — 91200 LIVER ELASTOGRAPHY: CPT | Mod: 26,S$PBB,, | Performed by: NURSE PRACTITIONER

## 2018-12-06 PROCEDURE — 91200 LIVER ELASTOGRAPHY: CPT | Mod: PBBFAC | Performed by: NURSE PRACTITIONER

## 2018-12-06 PROCEDURE — 99204 OFFICE O/P NEW MOD 45 MIN: CPT | Mod: S$PBB,,, | Performed by: NURSE PRACTITIONER

## 2018-12-06 NOTE — PROGRESS NOTES
[facility] Visit New Patient Note    REFERRING PROVIDER: Dr. Gabby Valenzuela    CHIEF COMPLAINT: Elevated liver enzymes     HPI: This is a 58 y.o. Black or  male referred for evaluation of elevated liver enzymes. Review of labs shows mildly elevated transaminases since 1/2018 (40-50s). Alk phos normal. Synthetic liver function normal. Platelets normal. Positive HCV Ab noted though no detectable HCV RNA. Negative for hepatitis B. No available imaging for review.        He is followed by Rheumatology for rheumatoid arthritis. Previously on methotrexate for a little over 1 year; stopped 8/2018. Plans to start Enbrel. Also with noted history of latent syphilis, followed by ID. Completed treatment with PCN.              He reports having a liver biopsy ([facility]) in 2003 when he was initially told he had hepatitis C. HCV RNA has consistently remained undetected and he has never been treated for HCV. Does not recall any other findings from the biopsy. He notes that a local provider recently told him he has fatty liver. His risk factors for fatty liver include - obesity, Body mass index is 43.89 kg/m²., HTN, HLD. He has gained ~20 lb since restarting prednisone for his RA. Denies excessive alcohol use though reports drinking ~6 beers/day on weekend days. H/o drug use but none in >30 years. No herbal supplements. Denies family history of liver disease.            He feels well other than chronic joint pains. Denies symptoms of hepatic decompensation including jaundice, dark urine, hematemesis, melena, slowed mentation, or abdominal distention.         Review of patient's allergies indicates:  No Known Allergies     Current Outpatient Medications on File Prior to Visit   Medication Sig Dispense Refill    amlodipine-atorvastatin (CADUET) 10-40 mg per tablet Take 1 tablet by mouth once daily.       fish oil-omega-3 fatty acids 300-1,000 mg capsule Take 2 g by mouth once daily.      furosemide  (LASIX) 20 MG tablet Take 20 mg by mouth as needed.       hydroCHLOROthiazide (HYDRODIURIL) 25 MG tablet Take 1 tablet (25 mg total) by mouth once daily. 90 tablet 3    omeprazole (PRILOSEC) 20 MG capsule Take 1 capsule (20 mg total) by mouth once daily. 30 capsule 11    predniSONE (DELTASONE) 10 MG tablet Take 10 mg by mouth once daily.       sildenafil (VIAGRA) 50 MG tablet Take 50 mg by mouth.      etanercept (ENBREL SURECLICK) 50 mg/mL (0.98 mL) PnIj Inject 50 mg into the skin every 7 days 3.92 mL 11    [DISCONTINUED] folic acid (FOLVITE) 1 MG tablet Take 1 tablet (1 mg total) by mouth once daily. 90 tablet 3    [DISCONTINUED] methotrexate 2.5 MG Tab Take 6 tablets (15 mg total) by mouth every 7 days. 30 tablet 2    [DISCONTINUED] methotrexate 2.5 MG Tab Take 15 mg by mouth.      [DISCONTINUED] predniSONE (DELTASONE) 5 MG tablet Take 2 tablets (10 mg total) by mouth once daily. 60 tablet 3     No current facility-administered medications on file prior to visit.      PMHX:  has a past medical history of Hyperlipidemia, Hypertension, Rheumatoid arthritis, Rheumatoid arthritis involving multiple sites with positive rheumatoid factor (4/16/2018), and Syphilis.    PSHX:  has a past surgical history that includes Liver biopsy.    FAMILY HISTORY: Negative for liver disease, reviewed in EPIC.    SOCIAL HISTORY:   Social History     Tobacco Use   Smoking Status Former Smoker   Smokeless Tobacco Never Used       Social History     Substance and Sexual Activity   Alcohol Use Yes    Comment: 6 beers a day on weekends       Social History     Substance and Sexual Activity   Drug Use No       ROS:   GENERAL: Denies fever, chills, fatigue, (+) weight gain  HEENT: Denies headaches, dizziness, vision/hearing changes  CARDIOVASCULAR: Denies chest pain, palpitations. (+) intermittent leg swelling   RESPIRATORY: Denies dyspnea, cough  GI: Denies abdominal pain, rectal bleeding, nausea, vomiting. No change in bowel pattern  "or color  : Denies dysuria, hematuria    SKIN: Denies rash, itching   NEURO: Denies confusion, memory loss, or mood changes  PSYCH: Denies depression or anxiety  HEME/LYMPH: Denies easy bruising or bleeding  MSK: (+) joint pain      PHYSICAL EXAM:   Friendly Black or  male, in no acute distress; alert and oriented to person, place and time  VITALS: BP (!) 144/77 (BP Location: Left arm, Patient Position: Sitting, BP Method: X-Large (Automatic))   Pulse 73   Ht 5' 9" (1.753 m)   Wt 134.8 kg (297 lb 2.9 oz)   SpO2 97%   BMI 43.89 kg/m²   HENT: Normocephalic, without obvious abnormality. Oral mucosa pink and moist.   EYES: Sclerae anicteric.   NECK: Supple. No masses or cervical adenopathy.  CARDIOVASCULAR: Regular rate and rhythm. No murmurs.  RESPIRATORY: Normal respiratory effort. BBS CTA. No wheezes or crackles.  GI: Soft, non-tender, non-distended. No palpable hepatosplenomegaly. No masses palpable. No ascites.  EXTREMITIES:  No clubbing, cyanosis. Trace edema to RLE.   SKIN: Warm and dry. No jaundice. No rashes noted to exposed skin. No telangectasias noted. No palmar erythema.  NEURO:  Normal gait. No asterixis.  PSYCH:  Memory intact. Thought and speech pattern appropriate. Behavior normal. No depression or anxiety noted.    RECENT LABS:    Labs:  Lab Results   Component Value Date    WBC 7.00 08/23/2018    HGB 13.5 (L) 08/23/2018    HCT 39.5 (L) 08/23/2018     08/23/2018     08/23/2018    K 3.6 08/23/2018    CREATININE 1.2 08/23/2018    ALT 58 (H) 08/23/2018    AST 42 (H) 08/23/2018    ALKPHOS 102 08/23/2018    BILITOT 0.8 08/23/2018    ALBUMIN 4.0 08/23/2018       No results found for: HEPAIGG    Hepatitis B Surface Ag   Date Value Ref Range Status   01/16/2018 Negative  Final     Hep B Core Total Ab   Date Value Ref Range Status   01/16/2018 Negative  Final     Hep B S Ab   Date Value Ref Range Status   01/16/2018 Negative  Final     Hepatitis C Ab   Date Value Ref Range " Status   01/16/2018 Positive (A)  Final        DIAGNOSTIC STUDIES: none recent for review      ASSESSMENT:  58 y.o. Black or  male with:  1. Elevated liver enzymes, etiology unclear at this time but may be due to fatty liver  -- Transaminases mildly elevated (40-50s); alk phos normal  -- Synthetic liver function normal; platelets normal   -- Negative for Hep B; HCV Ab (+) though no detectable viremia    -- H/o methotrexate use (~1 year) though stopped in 8/2018  -- Risk factors for fatty liver: obesity, HTN, HLD, possible heavy alcohol use       2. Positive hepatitis C Ab  -- No detectable RNA so does not have Hep C    3. Rheumatoid arthritis with h/o methotrexate use    4. Obesity, Body mass index is 43.89 kg/m².      PLAN:  1. Serologic workup to r/o other disorders that could be causing abnormal LFT's including: MACIEJ, ASMA, IgG, AMA, viral hepatitis, ferritin, iron/TIBC, ceruloplasmin, alpha-1 antitrypsin level and phenotype. We will also repeat liver tests.   2. Abdominal US  3. Will attempt Fibroscan for fibrosis and steatosis staging though this may be limited by body habitus    4. Discussed that if this is determined to be fatty liver, only treatment includes weight loss and managing risk factors (HTN, HLD)  5. No apparent contraindication to starting Enbrel at this time, but recommend monitoring liver tests while on treatment  6. Follow-up pending above results       Thank you for allowing me to participate in the care of [facility]     Patient was seen in collaboration with Dr. Sanchez       Addendum: Fibroscan kPa = 11.5, F3 or significant fibrosis. CAP = 350, S3 or >67% steatosis. Will notify patient of results.

## 2018-12-06 NOTE — PROGRESS NOTES
I have reviewed and concur with the VERITO's history, physical, assessment, and plan.  I have personally interviewed and examined the patient at bedside.  See below addendum for my evaluation and additional findings.     58 y.o. male that presents for elevated liver tests.  Risk factors for ALBARADO present.  Patient also reports substantial alcohol use.  Recommend serologic work-up to exclude other causes of chronic liver disease.  Encouraged to reduce alcohol intake to no more than 2-3 servings per day.  Lifestyle modifications for fatty liver.  No contraindication for Enbrel use for management of RA arthritis.    US and fibroscan for evaluation of steatosis and fibrosis staging     No hepatitis C viremia, either false positive or cleared virus     Patient will return to clinic with Olivia Hurley NP

## 2018-12-06 NOTE — PROCEDURES
Fibroscan Procedure     Name: Massimo Funez  Date of Procedure : 2018   :: Olivia Hurley NP  Diagnosis: elevated liver enzymes    Probe: XL    Fibroscan readin.5 KPa    Fibrosis:F3     CAP readin dB/m    Steatosis: :S3 , >67% steatosis

## 2018-12-06 NOTE — LETTER
December 6, 2018      Gabby Valenzuela MD  [address]  [address]  [facility] [address]           [facility] - Hepatology  [address]  [place] [address]  Phone: [phone]  Fax: [phone]          Patient: Massimo Funez   MR Number: [id number]   YOB: 1960   Date of Visit: 12/6/2018       Dear Dr. Gabby Valenzuela:    Thank you for referring Massimo Funez to me for evaluation. Attached you will find relevant portions of my assessment and plan of care.    If you have questions, please do not hesitate to call me. I look forward to following Massimo Funez along with you.    Sincerely,    Olivia Hurley, KAILASH    Enclosure  CC:  No Recipients    If you would like to receive this communication electronically, please contact [email or web address] or [phone] to request more information on [employer] Link access.    For providers and/or their staff who would like to refer a patient to [facility], please contact us through our one-stop-shop provider referral line, [facility], at [phone].    If you feel you have received this communication in error or would no longer like to receive these types of communications, please e-mail [email or web address]

## 2018-12-12 ENCOUNTER — HOSPITAL ENCOUNTER (OUTPATIENT)
Dept: RADIOLOGY | Facility: HOSPITAL | Age: 58
Discharge: HOME | End: 2018-12-12
Attending: NURSE PRACTITIONER
Payer: MEDICARE

## 2018-12-12 ENCOUNTER — OFFICE VISIT (OUTPATIENT)
Dept: INFECTIOUS DISEASES | Facility: CLINIC | Age: 58
End: 2018-12-12
Payer: MEDICARE

## 2018-12-12 VITALS
DIASTOLIC BLOOD PRESSURE: 80 MMHG | WEIGHT: 294.75 LBS | SYSTOLIC BLOOD PRESSURE: 126 MMHG | BODY MASS INDEX: 43.66 KG/M2 | TEMPERATURE: 98 F | HEIGHT: 69 IN | HEART RATE: 61 BPM

## 2018-12-12 DIAGNOSIS — A53.0 LATENT SYPHILIS: Primary | ICD-10-CM

## 2018-12-12 DIAGNOSIS — R76.8 POSITIVE HEPATITIS C ANTIBODY TEST: ICD-10-CM

## 2018-12-12 DIAGNOSIS — M05.79 RHEUMATOID ARTHRITIS INVOLVING MULTIPLE SITES WITH POSITIVE RHEUMATOID FACTOR: ICD-10-CM

## 2018-12-12 DIAGNOSIS — R74.8 ELEVATED LIVER ENZYMES: ICD-10-CM

## 2018-12-12 DIAGNOSIS — D84.9 IMMUNOCOMPROMISED, ACQUIRED: ICD-10-CM

## 2018-12-12 PROCEDURE — 99999 PR PBB SHADOW E&M-EST. PATIENT-LVL III: CPT | Mod: PBBFAC,,, | Performed by: PHYSICIAN ASSISTANT

## 2018-12-12 PROCEDURE — 76700 US EXAM ABDOM COMPLETE: CPT | Mod: TC

## 2018-12-12 PROCEDURE — 76700 US EXAM ABDOM COMPLETE: CPT | Mod: 26,,, | Performed by: RADIOLOGY

## 2018-12-12 PROCEDURE — 90632 HEPA VACCINE ADULT IM: CPT | Mod: PBBFAC

## 2018-12-12 PROCEDURE — 90739 HEPB VACC 2/4 DOSE ADULT IM: CPT | Mod: PBBFAC

## 2018-12-12 PROCEDURE — 99214 OFFICE O/P EST MOD 30 MIN: CPT | Mod: S$PBB,,, | Performed by: PHYSICIAN ASSISTANT

## 2018-12-12 PROCEDURE — 99213 OFFICE O/P EST LOW 20 MIN: CPT | Mod: PBBFAC,25 | Performed by: PHYSICIAN ASSISTANT

## 2018-12-12 PROCEDURE — 90670 PCV13 VACCINE IM: CPT | Mod: PBBFAC

## 2018-12-12 NOTE — LETTER
December 12, 2018      Gabby Valenzuela MD  [address]  [address]  [place] [address]           [facility] - Infectious Diseases  [address]  [place] [address]  Phone: [phone]  Fax: [phone]          Patient: Massimo Funez   MR Number: [id number]   YOB: 1960   Date of Visit: 12/12/2018       Dear Dr. Gabby Valenzuela:    Thank you for referring Massimo Funez to me for evaluation. Attached you will find relevant portions of my assessment and plan of care.    If you have questions, please do not hesitate to call me. I look forward to following Massimo Funez along with you.    Sincerely,    Joshua Huddleston Jr., PA    Enclosure  CC:  No Recipients    If you would like to receive this communication electronically, please contact [email or web address] or [phone] to request more information on [employer] Link access.    For providers and/or their staff who would like to refer a patient to [facility], please contact us through our one-stop-shop provider referral line, [facility], at [phone].    If you feel you have received this communication in error or would no longer like to receive these types of communications, please e-mail [email or web address]

## 2018-12-12 NOTE — PROGRESS NOTES
Patient received Heplisav-b, Hep A, and Prevnar vaccines w/vis and next appts.  Tolerated well and left in NAD

## 2018-12-12 NOTE — PROGRESS NOTES
Pre Biologic Response Modifier Therapy Consult  BMR Recipient Evaluation    Requesting Physician: Gabby Valenzuela MD    Reason for Visit:    Chief Complaint   Patient presents with    Rheumatoid Arthritis     History of Present Illness  Massimo Funez is a 58 y.o. year old Black or  male with advanced Rheumatoid Arthritis currently being evaluated for prior to starting biologic response modifer (BRM) therapy.  He has been treated with MTX and prednisone but has been off MTX since 8/2018.  He denies any infectious issues while on MTX/Prednisone.  He does have hisotyr of syphilis that was treated in 2/2018 x 3 doses of PCN as well as his partner.  He denies any new sexual partners. He has a history of being Hep C Ab positive and Hep C PCR negative in 1/2018 and is followed by hepatology.   Patient denies any recent fever, chills, or infective infective illnesses.      1) Do you have a history of:    YES NO   Diabetes                 []       [x]   Wound Foot Infection/Bone Infection   []  [x]   Surgical Removal of Spleen    []  [x]       2) Have you had recurrent infections involving:             YES NO  Sinus infections  []  [x]  Sore Throat   []  [x]                             Prostate Infections  []  [x]  .                         Bladder Infections  []  [x]                                 Kidney Infections  []  [x]        Intestinal Infections  []  [x]   Skin Infections   [x]  [] Boils - MRSA in past - no recent infections - last was 2-3 years ago                  Reproductive Infections             n  Periodontal Disease             n      3)Have you ever had: YES     NO UNKNOWN      Chicken Pox   [x]  []  []                 Shingles   []  [x]  []                Orolabial Herpes             []  [x]  []       Genital Herpes  []  [x]  []           Cytomegalovirus  []  [x]  []               Damaris-Barr Virus  []  [x]  []              Genital Warts   []  [x]  []                Hepatitis A   []  [x]   []             Hepatitis B   []  [x]  []                Hepatitis C   [x]  []  []                 Syphilis   [x]  []  []                Gonorrhea   [x]  []  []  In teens - tx no recurrence                Pelvic Inflammatory  []  [x]  []   Disease   []  []  []                    Chlamydia Infection  []  [x]  []                Intestinal parasites        or worms   []  [x]  []                 Fungal Infections  []  [x]  []                Blood Infections  []  [x]  []     Comment:       4) Have you ever been exposed   YES NO  to someone with tuberculosis?  []  [x]   If yes, what treatment did you receive:     5) What states have you lived in? [place], [place]    6) What countries have you visited for more than 2 weeks?    none                      YES NO  7) Did you have any associated infections?     []  [x]     8) Are you planning to travel outside the           [place] after starting BRMs?    []   [x]           Household                  YES NO  9 Do you have pets living in your house?   []   [x]             If yes, describe:     Do you spend time or live on a farm or                 have livestock or other farm animals?   []  [x]   If yes, which ones:    Do you have a fish tank?     []  [x]                       Do you have a litter box?     []  [x]                        Do you fish or hunt?      []  [x]                       Do you clean or skin fish or animals?   [x]  []                      Do you consume raw or undercooked                meat, fish, or shellfish?     []  [x]         10) What occupations have you had?  in past - on disability now            11) Patient reports hobby to be none                           12)Do you garden or otherwise  YES NO   work in the soil?    []  [x]   13)Do you hike, camp, or spend   time in wooded areas?   []  [x]                           14) The patient's immunization history was reviewed.   Have you ever received:  YES NO UNKNOWN DATES  Routine Childhood vaccines  [x]   []  []                Influenza vaccine   [x]  []  []  2018       Pneumonia    [x]  []  [] 1-2 yr ago      Tetanus-diptheria   []  [x]  []   Hepatitis A vaccine series       []  [x]  []       Hepatitis B vaccine series       []  [x]  []        Meningitis vaccine   []  [x]  []     Varicella vaccine   []  [x]  []                  Based on the patients immunization history and serologies, immunizations were ordered:         Ordered  Not Ordered  Influenza Vaccine     []    []   Hepatitis A series at 0, 6 months   [x]    []   Hepatitis B sries at 0, 1, and 6 months  []    []   Hepatitis B High Dose series 0,1, and 6 months [x]    []   Prevnar      [x]    []   Pneumovax      []    [x]   TDap       [x]    []   Shingrix      [x]    []   Menactra      []    [x]   HiB       []    [x]               The patient was encouraged to contact us about any problems that may develop after immunization and possible side effects were reviewed.       Allergies: Patient has no known allergies.  Immunization History   Administered Date(s) Administered    Influenza - Trivalent - PF (ADULT) 12/04/2017     Past Medical History:   Diagnosis Date    Hyperlipidemia     Hypertension     Rheumatoid arthritis     Rheumatoid arthritis involving multiple sites with positive rheumatoid factor 4/16/2018    Syphilis      Past Surgical History:   Procedure Laterality Date    LIVER BIOPSY        Social History     Socioeconomic History    Marital status: Legally      Spouse name: Not on file    Number of children: Not on file    Years of education: Not on file    Highest education level: Not on file   Social Needs    Financial resource strain: Not on file    Food insecurity - worry: Not on file    Food insecurity - inability: Not on file    Transportation needs - medical: Not on file    Transportation needs - non-medical: Not on file   Occupational History    Not on file   Tobacco Use    Smoking status: Former Smoker     Smokeless tobacco: Never Used   Substance and Sexual Activity    Alcohol use: Yes     Comment: 6 beers a day on weekends    Drug use: No    Sexual activity: Yes   Other Topics Concern    Not on file   Social History Narrative    Not on file       Review of Systems   Constitution: Positive for malaise/fatigue. Negative for chills, decreased appetite, fever, weakness, night sweats, weight gain and weight loss.   HENT: Negative for congestion, ear pain, hearing loss, hoarse voice, sore throat and tinnitus.    Eyes: Negative for blurred vision, redness and visual disturbance.   Cardiovascular: Negative for chest pain, leg swelling and palpitations.   Respiratory: Negative for cough, hemoptysis, shortness of breath, sputum production and wheezing.    Endocrine: Negative for cold intolerance and heat intolerance.   Hematologic/Lymphatic: Negative for adenopathy. Does not bruise/bleed easily.   Skin: Negative for dry skin, itching, rash and suspicious lesions.   Musculoskeletal: Positive for back pain and joint pain. Negative for myalgias and neck pain.   Gastrointestinal: Negative for abdominal pain, constipation, diarrhea, heartburn, nausea and vomiting.   Genitourinary: Negative for dysuria, flank pain, frequency, hematuria, hesitancy and urgency.   Neurological: Negative for dizziness, headaches, numbness and paresthesias.   Psychiatric/Behavioral: Negative for depression and memory loss. The patient does not have insomnia and is not nervous/anxious.    Allergic/Immunologic: Negative for environmental allergies, HIV exposure, hives and persistent infections.     Physical Exam   Constitutional: He is oriented to person, place, and time. He appears well-developed and well-nourished.   HENT:   Head: Normocephalic and atraumatic.   Mouth/Throat: Uvula is midline, oropharynx is clear and moist and mucous membranes are normal. He does not have dentures. No oral lesions. Abnormal dentition (some missign teeth). No dental  abscesses, lacerations or dental caries.   Eyes: Conjunctivae and lids are normal. Pupils are equal, round, and reactive to light. No scleral icterus.   Neck: Neck supple.   Cardiovascular: Normal rate and regular rhythm. Exam reveals no gallop and no friction rub.   No murmur heard.  Pulmonary/Chest: Effort normal and breath sounds normal. No respiratory distress. He has no decreased breath sounds. He has no wheezes. He has no rhonchi. He has no rales.   Abdominal: Soft. Normal appearance, normal aorta and bowel sounds are normal. He exhibits no distension and no mass. There is no hepatosplenomegaly. There is no tenderness. There is no rebound and no guarding.   Musculoskeletal: He exhibits no edema.   Lymphadenopathy:        Head (right side): No submental, no submandibular, no tonsillar, no preauricular, no posterior auricular and no occipital adenopathy present.        Head (left side): No submental, no submandibular, no tonsillar, no preauricular, no posterior auricular and no occipital adenopathy present.     He has no cervical adenopathy.     He has no axillary adenopathy.        Right: No inguinal, no supraclavicular and no epitrochlear adenopathy present.        Left: No inguinal, no supraclavicular and no epitrochlear adenopathy present.   Neurological: He is alert and oriented to person, place, and time. No cranial nerve deficit.   Skin: Skin is warm, dry and intact. No lesion and no rash noted. He is not diaphoretic. No erythema. No pallor.   Psychiatric: He has a normal mood and affect. His behavior is normal.     Diagnostics:   RPR   Date Value Ref Range Status   07/31/2018 Reactive (A) Non-reactive Final     No results found for: CMVANTIBODIE  No results found for: HIV1X2  No results found for: HTLVIIIANTIB  Hepatitis B Surface Ag   Date Value Ref Range Status   01/16/2018 Negative  Final     Hep B Core Total Ab   Date Value Ref Range Status   01/16/2018 Negative  Final     Hepatitis C Ab   Date Value  Ref Range Status   01/16/2018 Positive (A)  Final     No results found for: TOXOIGG  No components found for: TOXOIGGINTER  No results found for: SJK9VWU  No results found for: FTN1APJ  No results found for: VARICELLAZOS  No results found for: VARICELLAINT  No results found for: STRONGANTIGG  No results found for: EPSTEINBARRV  Hep B S Ab   Date Value Ref Range Status   01/16/2018 Negative  Final     Results for SANAZ GUTIERREZ (MRN [id number]) as of 12/12/2018 11:44   Ref. Range 1/16/2018 13:30 1/22/2018 12:04 1/22/2018 12:05 7/31/2018 14:50   Hep B Core Total Ab Unknown Negative      Hep B S Ab Unknown Negative      Hepatitis B Surface Ag Unknown Negative      HCV Log Latest Ref Range: <1.08 Log (10) IU/mL  <1.08     HCV RNA Quant PCR Latest Ref Range: <12 IU/mL  <12     HCV, Qualitative Latest Ref Range: Not detected IU/mL  Not detected     Hepatitis C Ab Unknown Positive (A)      Hepatitis C Virus (HCV) RNA Detection/Quantification RT-PCR Latest Ref Range: Undetected IU/mL   Undetected    TB AG Latest Ref Range: See text IU/mL 0.08      TB AG NIL Latest Ref Range: See text IU/mL 0.00      TB GOLD INTERP Latest Units: IU/mL Negative      TB Nil Latest Ref Range: See text IU/mL 0.08      HIV 1/2 Ag/Ab Latest Ref Range: Negative  Negative      FTA-ABS Latest Ref Range: Non-reactive  Reactive (A)   Reactive (A)   RPR Latest Ref Range: Non-reactive  Reactive (A)   Reactive (A)   RPR Quantitative Unknown 1:1 (A)   1:1 (A)       X-Ray Chest PA And Lateral   Order: [id number]   Status:  Final result   Visible to patient:  Yes (Patient Portal) Next appt:  Today at 01:15 PM in Radiology ([facility]) Dx:  Rheumatoid arthritis involving both h...   Details     Reading Physician Reading Date Result Priority   Gopi Cormier Jr., MD 1/22/2018       Narrative     PA and Lateral, 2 view chest is obtained.The mediastinal and cardiac size and contours are normal.  The diaphragms and pleura are clear.  There are no  intra-pulmonary masses or infiltrates. There is no pneumothorax or pleural effusion.    Impression-Negative chest.      Electronically signed by: Gopi Cormier MD  Date: 01/22/18  Time: 12:46              Last Resulted: 01/22/18 12:46                 BRM - Candidacy    Biologic Response Modifier Candidacy: Based on available information, there are no identified significant barriers to BRMs from an infectious disease standpoint pending a negative Strongyloides IgG.  Recommend clearance for BRMs from Hepatology.  His syphilis has been treated in the past and has not had new partners and his partner was treated as well but if develops any unusual symptoms after starting BRMS, would suggest checking RPR titers.  Final determination of BRM candidacy will be made once evaluation is complete and reviewed.    TOVA Glass  Vaccine and Serology Needs:  1. Hep A today and 6 months  2. Heplisav today and 1 month  3. Tdap  4. Shingrix - rx given  5. Prevnar - rx given  6. Strongyloides IgG      Counseling:I discussed with Massimo the risk for increased susceptibility to infections following BRM therapy including increased risk for infection.  The patients has been counseled on the importance of vaccinations including but not limited to a yearly flu vaccine.Specific guidance has been provided to the patient regarding the patients occupation, hobbies and activities to avoid future infectious complications including but not limited to avoiding undercooked meats and seafood, proper hygiene, and contact with animals.

## 2018-12-13 ENCOUNTER — TELEPHONE (OUTPATIENT)
Dept: HEPATOLOGY | Facility: CLINIC | Age: 58
End: 2018-12-13

## 2018-12-17 NOTE — TELEPHONE ENCOUNTER
Called patient to review lab, ultrasound, and Fibroscan results. Serologic workup negative for other causes of liver disease. Fibroscan = F3 / S3, significant fibrosis and steatosis. Discussed that Fibroscan results may be limited by body habitus (BMI 43). Offered MRI elastography to further evaluate fibrosis. Also discussed option for diagnostic liver biopsy. May consider repeating Fibroscan if patient is able to lose some weight. He would like to take some time to think about the options. Reviewed implications if truly F3, including HCC screening every 6 months with ultrasound and labs. In the meantime, recommended lifestyle modifications for fatty liver (weight loss; controlling risk factors - HTN, HLD).    Please schedule patient for follow-up appointment in 3 months.

## 2018-12-18 ENCOUNTER — PATIENT MESSAGE (OUTPATIENT)
Dept: RHEUMATOLOGY | Facility: CLINIC | Age: 58
End: 2018-12-18

## 2018-12-20 ENCOUNTER — PATIENT MESSAGE (OUTPATIENT)
Dept: INTERNAL MEDICINE | Facility: CLINIC | Age: 58
End: 2018-12-20

## 2018-12-20 ENCOUNTER — PATIENT MESSAGE (OUTPATIENT)
Dept: RHEUMATOLOGY | Facility: CLINIC | Age: 58
End: 2018-12-20

## 2018-12-26 ENCOUNTER — PATIENT MESSAGE (OUTPATIENT)
Dept: RHEUMATOLOGY | Facility: CLINIC | Age: 58
End: 2018-12-26

## 2019-01-08 ENCOUNTER — HOSPITAL ENCOUNTER (INPATIENT)
Facility: HOSPITAL | Age: 59
LOS: 4 days | Discharge: HOME | DRG: 392 | End: 2019-01-12
Attending: EMERGENCY MEDICINE | Admitting: HOSPITALIST
Payer: MEDICARE

## 2019-01-08 DIAGNOSIS — R55 SYNCOPE: ICD-10-CM

## 2019-01-08 DIAGNOSIS — R55 SYNCOPE AND COLLAPSE: ICD-10-CM

## 2019-01-08 DIAGNOSIS — K85.90 PANCREATITIS: ICD-10-CM

## 2019-01-08 DIAGNOSIS — R74.8 ELEVATED AMYLASE AND LIPASE: ICD-10-CM

## 2019-01-08 DIAGNOSIS — R19.7 ACUTE DIARRHEA: ICD-10-CM

## 2019-01-08 DIAGNOSIS — A08.4 VIRAL GASTROENTERITIS: ICD-10-CM

## 2019-01-08 DIAGNOSIS — K92.1 MELENA: ICD-10-CM

## 2019-01-08 DIAGNOSIS — D50.0 IRON DEFICIENCY ANEMIA DUE TO CHRONIC BLOOD LOSS: ICD-10-CM

## 2019-01-08 DIAGNOSIS — R11.2 INTRACTABLE VOMITING WITH NAUSEA, UNSPECIFIED VOMITING TYPE: Primary | ICD-10-CM

## 2019-01-08 PROBLEM — K75.81 NASH (NONALCOHOLIC STEATOHEPATITIS): Status: ACTIVE | Noted: 2019-01-08

## 2019-01-08 PROBLEM — N20.0 NEPHROLITHIASIS: Status: ACTIVE | Noted: 2019-01-08

## 2019-01-08 PROBLEM — E83.52 HYPERCALCEMIA: Status: ACTIVE | Noted: 2019-01-08

## 2019-01-08 LAB
ALBUMIN SERPL BCP-MCNC: 4.2 G/DL
ALP SERPL-CCNC: 92 U/L
ALT SERPL W/O P-5'-P-CCNC: 43 U/L
AMYLASE SERPL-CCNC: 403 U/L
ANION GAP SERPL CALC-SCNC: 10 MMOL/L
AST SERPL-CCNC: 42 U/L
BACTERIA #/AREA URNS AUTO: ABNORMAL /HPF
BASOPHILS # BLD AUTO: 0.01 K/UL
BASOPHILS NFR BLD: 0.1 %
BILIRUB SERPL-MCNC: 1.2 MG/DL
BILIRUB UR QL STRIP: NEGATIVE
BUN SERPL-MCNC: 10 MG/DL
C DIFF GDH STL QL: NEGATIVE
C DIFF TOX A+B STL QL IA: NEGATIVE
CALCIUM SERPL-MCNC: 11.5 MG/DL
CHLORIDE SERPL-SCNC: 103 MMOL/L
CLARITY UR REFRACT.AUTO: ABNORMAL
CO2 SERPL-SCNC: 27 MMOL/L
COLOR UR AUTO: YELLOW
CREAT SERPL-MCNC: 1.1 MG/DL
DIFFERENTIAL METHOD: NORMAL
EOSINOPHIL # BLD AUTO: 0 K/UL
EOSINOPHIL NFR BLD: 0.1 %
ERYTHROCYTE [DISTWIDTH] IN BLOOD BY AUTOMATED COUNT: 12.9 %
EST. GFR  (AFRICAN AMERICAN): >60 ML/MIN/1.73 M^2
EST. GFR  (NON AFRICAN AMERICAN): >60 ML/MIN/1.73 M^2
ESTIMATED AVG GLUCOSE: 126 MG/DL
GLUCOSE SERPL-MCNC: 101 MG/DL
GLUCOSE UR QL STRIP: NEGATIVE
HBA1C MFR BLD HPLC: 6 %
HCT VFR BLD AUTO: 46.4 %
HGB BLD-MCNC: 15.6 G/DL
HGB UR QL STRIP: ABNORMAL
HYALINE CASTS UR QL AUTO: 0 /LPF
IMM GRANULOCYTES # BLD AUTO: 0.02 K/UL
IMM GRANULOCYTES NFR BLD AUTO: 0.3 %
INR PPP: 1
KETONES UR QL STRIP: ABNORMAL
LEUKOCYTE ESTERASE UR QL STRIP: ABNORMAL
LIPASE SERPL-CCNC: 567 U/L
LYMPHOCYTES # BLD AUTO: 1.9 K/UL
LYMPHOCYTES NFR BLD: 26.8 %
MAGNESIUM SERPL-MCNC: 2.3 MG/DL
MCH RBC QN AUTO: 29.9 PG
MCHC RBC AUTO-ENTMCNC: 33.6 G/DL
MCV RBC AUTO: 89 FL
MICROSCOPIC COMMENT: ABNORMAL
MONOCYTES # BLD AUTO: 0.5 K/UL
MONOCYTES NFR BLD: 7.5 %
NEUTROPHILS # BLD AUTO: 4.7 K/UL
NEUTROPHILS NFR BLD: 65.2 %
NITRITE UR QL STRIP: NEGATIVE
NRBC BLD-RTO: 0 /100 WBC
PH UR STRIP: 5 [PH] (ref 5–8)
PLATELET # BLD AUTO: 194 K/UL
PMV BLD AUTO: 11.9 FL
POTASSIUM SERPL-SCNC: 3.2 MMOL/L
PROT SERPL-MCNC: 8.8 G/DL
PROT UR QL STRIP: ABNORMAL
PROTHROMBIN TIME: 10.4 SEC
RBC # BLD AUTO: 5.22 M/UL
RBC #/AREA URNS AUTO: 34 /HPF (ref 0–4)
SODIUM SERPL-SCNC: 140 MMOL/L
SP GR UR STRIP: 1.01 (ref 1–1.03)
URN SPEC COLLECT METH UR: ABNORMAL
WBC # BLD AUTO: 7.19 K/UL
WBC #/AREA STL HPF: NORMAL /[HPF]
WBC #/AREA URNS AUTO: 13 /HPF (ref 0–5)

## 2019-01-08 PROCEDURE — 99285 EMERGENCY DEPT VISIT HI MDM: CPT | Mod: 25

## 2019-01-08 PROCEDURE — 85025 COMPLETE CBC W/AUTO DIFF WBC: CPT

## 2019-01-08 PROCEDURE — 89055 LEUKOCYTE ASSESSMENT FECAL: CPT

## 2019-01-08 PROCEDURE — 83690 ASSAY OF LIPASE: CPT

## 2019-01-08 PROCEDURE — 63600175 PHARM REV CODE 636 W HCPCS: Performed by: HOSPITALIST

## 2019-01-08 PROCEDURE — 99223 1ST HOSP IP/OBS HIGH 75: CPT | Mod: AI,,, | Performed by: HOSPITALIST

## 2019-01-08 PROCEDURE — 99223 PR INITIAL HOSPITAL CARE,LEVL III: ICD-10-PCS | Mod: AI,,, | Performed by: HOSPITALIST

## 2019-01-08 PROCEDURE — 81001 URINALYSIS AUTO W/SCOPE: CPT

## 2019-01-08 PROCEDURE — 25500020 PHARM REV CODE 255: Performed by: EMERGENCY MEDICINE

## 2019-01-08 PROCEDURE — 82248 BILIRUBIN DIRECT: CPT

## 2019-01-08 PROCEDURE — 80053 COMPREHEN METABOLIC PANEL: CPT

## 2019-01-08 PROCEDURE — 83735 ASSAY OF MAGNESIUM: CPT

## 2019-01-08 PROCEDURE — 82150 ASSAY OF AMYLASE: CPT

## 2019-01-08 PROCEDURE — 85610 PROTHROMBIN TIME: CPT

## 2019-01-08 PROCEDURE — 25000003 PHARM REV CODE 250: Performed by: EMERGENCY MEDICINE

## 2019-01-08 PROCEDURE — 87046 STOOL CULTR AEROBIC BACT EA: CPT

## 2019-01-08 PROCEDURE — 87449 NOS EACH ORGANISM AG IA: CPT

## 2019-01-08 PROCEDURE — 25000003 PHARM REV CODE 250: Performed by: HOSPITALIST

## 2019-01-08 PROCEDURE — 87086 URINE CULTURE/COLONY COUNT: CPT

## 2019-01-08 PROCEDURE — 99285 EMERGENCY DEPT VISIT HI MDM: CPT | Mod: ,,, | Performed by: EMERGENCY MEDICINE

## 2019-01-08 PROCEDURE — 96372 THER/PROPH/DIAG INJ SC/IM: CPT | Mod: 59

## 2019-01-08 PROCEDURE — 87427 SHIGA-LIKE TOXIN AG IA: CPT | Mod: 59

## 2019-01-08 PROCEDURE — 87045 FECES CULTURE AEROBIC BACT: CPT

## 2019-01-08 PROCEDURE — 11000001 HC ACUTE MED/SURG PRIVATE ROOM

## 2019-01-08 PROCEDURE — 83036 HEMOGLOBIN GLYCOSYLATED A1C: CPT

## 2019-01-08 PROCEDURE — 99285 PR EMERGENCY DEPT VISIT,LEVEL V: ICD-10-PCS | Mod: ,,, | Performed by: EMERGENCY MEDICINE

## 2019-01-08 PROCEDURE — 96360 HYDRATION IV INFUSION INIT: CPT

## 2019-01-08 PROCEDURE — 96361 HYDRATE IV INFUSION ADD-ON: CPT

## 2019-01-08 RX ORDER — IBUPROFEN 200 MG
16 TABLET ORAL
Status: DISCONTINUED | OUTPATIENT
Start: 2019-01-08 | End: 2019-01-12 | Stop reason: HOSPADM

## 2019-01-08 RX ORDER — AMLODIPINE BESYLATE 10 MG/1
10 TABLET ORAL DAILY
Status: DISCONTINUED | OUTPATIENT
Start: 2019-01-09 | End: 2019-01-10

## 2019-01-08 RX ORDER — FUROSEMIDE 20 MG/1
20 TABLET ORAL EVERY OTHER DAY
Status: DISCONTINUED | OUTPATIENT
Start: 2019-01-09 | End: 2019-01-10

## 2019-01-08 RX ORDER — ATORVASTATIN CALCIUM 20 MG/1
40 TABLET, FILM COATED ORAL DAILY
Status: DISCONTINUED | OUTPATIENT
Start: 2019-01-09 | End: 2019-01-12 | Stop reason: HOSPADM

## 2019-01-08 RX ORDER — SODIUM CHLORIDE 0.9 % (FLUSH) 0.9 %
5 SYRINGE (ML) INJECTION
Status: DISCONTINUED | OUTPATIENT
Start: 2019-01-08 | End: 2019-01-12 | Stop reason: HOSPADM

## 2019-01-08 RX ORDER — IBUPROFEN 200 MG
24 TABLET ORAL
Status: DISCONTINUED | OUTPATIENT
Start: 2019-01-08 | End: 2019-01-12 | Stop reason: HOSPADM

## 2019-01-08 RX ORDER — PANTOPRAZOLE SODIUM 40 MG/1
40 TABLET, DELAYED RELEASE ORAL DAILY
Status: DISCONTINUED | OUTPATIENT
Start: 2019-01-09 | End: 2019-01-12

## 2019-01-08 RX ORDER — HYDROCHLOROTHIAZIDE 25 MG/1
25 TABLET ORAL DAILY
Status: DISCONTINUED | OUTPATIENT
Start: 2019-01-09 | End: 2019-01-08

## 2019-01-08 RX ORDER — ENOXAPARIN SODIUM 100 MG/ML
40 INJECTION SUBCUTANEOUS EVERY 24 HOURS
Status: DISCONTINUED | OUTPATIENT
Start: 2019-01-08 | End: 2019-01-12

## 2019-01-08 RX ORDER — TAMSULOSIN HYDROCHLORIDE 0.4 MG/1
0.4 CAPSULE ORAL DAILY
Status: DISCONTINUED | OUTPATIENT
Start: 2019-01-08 | End: 2019-01-12 | Stop reason: HOSPADM

## 2019-01-08 RX ORDER — GLUCAGON 1 MG
1 KIT INJECTION
Status: DISCONTINUED | OUTPATIENT
Start: 2019-01-08 | End: 2019-01-12 | Stop reason: HOSPADM

## 2019-01-08 RX ORDER — PREDNISONE 10 MG/1
10 TABLET ORAL DAILY
Status: DISCONTINUED | OUTPATIENT
Start: 2019-01-09 | End: 2019-01-12 | Stop reason: HOSPADM

## 2019-01-08 RX ORDER — POTASSIUM CHLORIDE 20 MEQ/1
40 TABLET, EXTENDED RELEASE ORAL EVERY 4 HOURS
Status: COMPLETED | OUTPATIENT
Start: 2019-01-08 | End: 2019-01-08

## 2019-01-08 RX ORDER — SODIUM CHLORIDE 9 MG/ML
1000 INJECTION, SOLUTION INTRAVENOUS
Status: COMPLETED | OUTPATIENT
Start: 2019-01-08 | End: 2019-01-08

## 2019-01-08 RX ADMIN — ENOXAPARIN SODIUM 40 MG: 100 INJECTION SUBCUTANEOUS at 05:01

## 2019-01-08 RX ADMIN — IOHEXOL 100 ML: 350 INJECTION, SOLUTION INTRAVENOUS at 01:01

## 2019-01-08 RX ADMIN — SODIUM CHLORIDE, SODIUM LACTATE, POTASSIUM CHLORIDE, AND CALCIUM CHLORIDE 1000 ML: .6; .31; .03; .02 INJECTION, SOLUTION INTRAVENOUS at 09:01

## 2019-01-08 RX ADMIN — POTASSIUM CHLORIDE 40 MEQ: 1500 TABLET, EXTENDED RELEASE ORAL at 05:01

## 2019-01-08 RX ADMIN — SODIUM CHLORIDE 1000 ML: 0.9 INJECTION, SOLUTION INTRAVENOUS at 10:01

## 2019-01-08 RX ADMIN — TAMSULOSIN HYDROCHLORIDE 0.4 MG: 0.4 CAPSULE ORAL at 05:01

## 2019-01-08 RX ADMIN — POTASSIUM CHLORIDE 40 MEQ: 1500 TABLET, EXTENDED RELEASE ORAL at 09:01

## 2019-01-08 NOTE — ED PROVIDER NOTES
Encounter Date: 1/8/2019    SCRIBE #1 NOTE: I, Yordan Mckee, am scribing for, and in the presence of,  Dr. Lovelace. I have scribed the entire note.       History     Chief Complaint   Patient presents with    Multiple Complaints     L leg tingling for past few nights, diarrhea, fatty liver     The patient is a 57 y/o male with a PMHx of RA, latent syphillis which has been treated with penicillin, HTN, and Hep C Ab positive, who presents to the ED for evaluation of diarrhea, general weakness x3 days, lightheadedness at night, and R leg tingling. Pt reports diarrhea began last night and has had 5 stools between last night and this morning. He reports feeling lightheaded last night and feeling as if he was going to pass out. He has had no sick contacts. He reports some nausea, normal activity levels, chronic back pain, and no hx of GI bleeds. He denies any vomiting, cough, SOB, abdominal cramping, hematuria. He is not currently on abx. He is taking prednisone and is compliant with his antihypertensive medications. He did not take Enbrel this week as he felt he was getting a cold.           Review of patient's allergies indicates:  No Known Allergies  Past Medical History:   Diagnosis Date    Hyperlipidemia     Hypertension     Nephrolithiasis 1/8/2019    Rheumatoid arthritis     Rheumatoid arthritis involving multiple sites with positive rheumatoid factor 4/16/2018    Syphilis      Past Surgical History:   Procedure Laterality Date    LIVER BIOPSY       Family History   Problem Relation Age of Onset    Diabetes Mellitus Mother     Inflammatory bowel disease Mother     Osteoarthritis Mother     Hypertension Father     Stroke Father     Cancer Neg Hx     Heart disease Neg Hx     Hyperlipidemia Neg Hx     Lupus Neg Hx     Kidney disease Neg Hx     Thyroid disease Neg Hx     Rheum arthritis Neg Hx     Psoriasis Neg Hx     Cirrhosis Neg Hx      Social History     Tobacco Use    Smoking status:  Former Smoker    Smokeless tobacco: Never Used   Substance Use Topics    Alcohol use: No     Frequency: Never    Drug use: No     Review of Systems   Constitutional: Negative for activity change and fever.   HENT: Negative for mouth sores and rhinorrhea.    Eyes: Negative for discharge.   Respiratory: Negative for cough and shortness of breath.    Cardiovascular: Negative for leg swelling.   Gastrointestinal: Positive for diarrhea and nausea. Negative for abdominal pain, blood in stool and vomiting.   Genitourinary: Negative for dysuria and hematuria.   Musculoskeletal: Positive for back pain. Negative for neck pain.   Skin: Negative for rash.   Neurological: Positive for weakness (Generalized weakness at night) and light-headedness.       Physical Exam     Initial Vitals [01/08/19 0856]   BP Pulse Resp Temp SpO2   137/88 82 18 98.7 °F (37.1 °C) 97 %      MAP       --         Physical Exam    Constitutional: He is cooperative.   HENT:   Head: Normocephalic.   Mouth/Throat: Oropharynx is clear and moist.   Eyes: No scleral icterus.   Neck: Normal range of motion. Neck supple.   Cardiovascular: Normal rate and regular rhythm.   Pulmonary/Chest: Effort normal and breath sounds normal.   Abdominal: He exhibits no distension. There is no tenderness.   Musculoskeletal: Normal range of motion.   Neurological: He is alert.         ED Course   Procedures  Labs Reviewed   COMPREHENSIVE METABOLIC PANEL - Abnormal; Notable for the following components:       Result Value    Potassium 3.2 (*)     Calcium 11.5 (*)     Total Protein 8.8 (*)     Total Bilirubin 1.2 (*)     AST 42 (*)     All other components within normal limits   URINALYSIS, REFLEX TO URINE CULTURE - Abnormal; Notable for the following components:    Appearance, UA Hazy (*)     Protein, UA 1+ (*)     Ketones, UA Trace (*)     Occult Blood UA 2+ (*)     Leukocytes, UA 1+ (*)     All other components within normal limits    Narrative:     Preferred Collection  Type->Urine, Clean Catch   LIPASE - Abnormal; Notable for the following components:    Lipase 567 (*)     All other components within normal limits   URINALYSIS MICROSCOPIC - Abnormal; Notable for the following components:    RBC, UA 34 (*)     WBC, UA 13 (*)     Bacteria, UA Moderate (*)     All other components within normal limits    Narrative:     Preferred Collection Type->Urine, Clean Catch   HEMOGLOBIN A1C - Abnormal; Notable for the following components:    Hemoglobin A1C 6.0 (*)     All other components within normal limits    Narrative:     add on [facility] #[id number] per Dr. Phelan @  01/08/2019  15:43    CBC W/ AUTO DIFFERENTIAL   PROTIME-INR   MAGNESIUM   HEMOGLOBIN A1C        ECG Results    None       Imaging Results          CT Abdomen Pelvis With Contrast (Final result)     Abnormal  Result time 01/08/19 14:34:58    Final result by Mitesh Yousif MD (01/08/19 14:34:58)                 Impression:      1. 0.6 cm stone at the distal left ureter without significant ureteral dilatation.  No hydronephrosis.  2. High density material within the bilateral renal collecting systems which may represent nonobstructing renal calculi versus early excretion of contrast.  3. In this patient with history of elevated lipase, the pancreas is unremarkable in appearance without peripancreatic inflammatory changes.  4. Additional findings as above.  This report was flagged in Epic as abnormal.    Electronically signed by resident: Phil Springer  Date:    01/08/2019  Time:    14:05    Electronically signed by: Mitesh Yousif MD  Date:    01/08/2019  Time:    14:34             Narrative:    EXAMINATION:  CT ABDOMEN PELVIS WITH CONTRAST    CLINICAL HISTORY:  elevated lipase    TECHNIQUE:  Routine axial CT images of the abdomen and pelvis were obtained after administration 75cc of IV Omnipaque 350.  Coronal and Sagittal reformatted images were also obtained.    COMPARISON:  Ultrasound 12/12/2018    FINDINGS:  Base of heart is normal  in size.  No pericardial effusion.    Minor platelike atelectasis of the left lung base.  No large focal consolidation.  No pleural effusion or pneumothorax.    Liver is normal in size and attenuation.  No focal hepatic lesion.  No gallstones.  No biliary ductal dilatation.  Portal vein is patent.  Spleen and adrenal glands are unremarkable.  Pancreas is unremarkable.  No peripancreatic inflammatory changes.  No pancreatic ductal dilatation.    Kidneys are normal in size and location.  Normal concentration and excretion of contrast.  On the early contrast images there is high density material within the bilateral renal collecting systems which may represent the nonobstructing renal calculi versus early excretion of contrast.  No hydronephrosis.  0.6 cm stone at the distal left ureter.  No significant upstream ureteral dilatation.  Mild scarring of the left kidney with a subcentimeter hypodense lesion in the right kidney.  Urinary bladder is unremarkable.  Prostatic calcifications.    Stomach is unremarkable.  Small and large bowel are normal caliber.  No evidence of bowel obstruction or inflammation.  Appendix is normal caliber without inflammatory changes.    No free intraperitoneal air or fluid. No retroperitoneal or mesenteric adenopathy.    Mild calcific atherosclerosis of the abdominal aorta.  No aneurysm.    Soft tissues of the body wall are unremarkable.    Osseous structures demonstrate age-appropriate degenerative changes.  No acute osseous abnormality.                              X-Rays:   Independently Interpreted Readings:   Abdomen: CT of abdomen and pelvis negative for abnormality     Medical Decision Making:   History:   Old Medical Records: I decided to obtain old medical records.  Initial Assessment:   57 yo male with Hx of RA, elevated liver enzymes, positive for Hep C and is followed by rheumatology and hepatology is now complaining with onset of diarrhea since last night that continued the  morning with assoc nausea. He has had 3 days of feeling poorly at night but he is able to be active. Will do labs. He is not on abx. No need for imaging. Will give fluids slowly and treat nausea. No need for analgesics. If he does have a stool, we will send for WBCs. Will also do orthostatics  Clinical Tests:   Lab Tests: Ordered and Reviewed            Scribe Attestation:   Scribe #1: I performed the above scribed service and the documentation accurately describes the services I performed. I attest to the accuracy of the note.    Attending Attestation:             Attending ED Notes:   16:56  Patient presenting with symptoms of mild diarrhea and abdominal discomfort which have been going on for 24 hours with aggressive evaluation but found to have an elevated lipase without much abdominal discomfort. CAT scan did not show any gross abnormalities but will admit to internal medicine to have these findings resolved.             Clinical Impression:   The primary encounter diagnosis was Intractable vomiting with nausea, unspecified vomiting type. Diagnoses of Pancreatitis, Syncope and collapse, Syncope, and Viral gastroenteritis were also pertinent to this visit.      Disposition:   Disposition: Admitted  Condition: Serious                        Jeromy Lovelace MD  01/12/19 1620

## 2019-01-08 NOTE — ED TRIAGE NOTES
Patient in for eval of fatigue and diarrhea that started last night. Patient reports he has had multiple watery stools and nausea. Denies chills, fever, vomiting, headache, chest pain, abdominal pain, or swelling to extremities. Reports he has had tingling to his right upper thigh for a few days now as well. Patient conversing with ease, no apparent distress noted.

## 2019-01-09 PROBLEM — A08.4 VIRAL GASTROENTERITIS: Status: ACTIVE | Noted: 2019-01-09

## 2019-01-09 PROBLEM — R74.8 ELEVATED AMYLASE AND LIPASE: Status: ACTIVE | Noted: 2019-01-08

## 2019-01-09 PROBLEM — R11.2 INTRACTABLE VOMITING WITH NAUSEA: Status: ACTIVE | Noted: 2019-01-09

## 2019-01-09 LAB
25(OH)D3+25(OH)D2 SERPL-MCNC: 16 NG/ML
ALBUMIN SERPL BCP-MCNC: 3.7 G/DL
ALP SERPL-CCNC: 84 U/L
ALT SERPL W/O P-5'-P-CCNC: 43 U/L
ANION GAP SERPL CALC-SCNC: 11 MMOL/L
AST SERPL-CCNC: 37 U/L
BACTERIA UR CULT: NO GROWTH
BASOPHILS # BLD AUTO: 0.01 K/UL
BASOPHILS NFR BLD: 0.1 %
BILIRUB DIRECT SERPL-MCNC: 0.5 MG/DL
BILIRUB SERPL-MCNC: 0.9 MG/DL
BUN SERPL-MCNC: 10 MG/DL
CALCIUM CREATININE RATIO: 0.01
CALCIUM SERPL-MCNC: 11.2 MG/DL
CALCIUM UR-MCNC: 5 MG/DL
CHLORIDE SERPL-SCNC: 106 MMOL/L
CHOLEST SERPL-MCNC: 122 MG/DL
CHOLEST/HDLC SERPL: 3.9 {RATIO}
CO2 SERPL-SCNC: 24 MMOL/L
CREAT SERPL-MCNC: 0.9 MG/DL
CREAT UR-MCNC: 433 MG/DL
DIFFERENTIAL METHOD: NORMAL
E COLI SXT1 STL QL IA: NEGATIVE
E COLI SXT2 STL QL IA: NEGATIVE
EOSINOPHIL # BLD AUTO: 0 K/UL
EOSINOPHIL NFR BLD: 0.3 %
ERYTHROCYTE [DISTWIDTH] IN BLOOD BY AUTOMATED COUNT: 13.1 %
EST. GFR  (AFRICAN AMERICAN): >60 ML/MIN/1.73 M^2
EST. GFR  (NON AFRICAN AMERICAN): >60 ML/MIN/1.73 M^2
GLUCOSE SERPL-MCNC: 96 MG/DL
HCT VFR BLD AUTO: 45.5 %
HDLC SERPL-MCNC: 31 MG/DL
HDLC SERPL: 25.4 %
HGB BLD-MCNC: 15.4 G/DL
IMM GRANULOCYTES # BLD AUTO: 0.01 K/UL
IMM GRANULOCYTES NFR BLD AUTO: 0.1 %
LDLC SERPL CALC-MCNC: 68.6 MG/DL
LYMPHOCYTES # BLD AUTO: 2.7 K/UL
LYMPHOCYTES NFR BLD: 39.5 %
MAGNESIUM SERPL-MCNC: 2.1 MG/DL
MCH RBC QN AUTO: 30.9 PG
MCHC RBC AUTO-ENTMCNC: 33.8 G/DL
MCV RBC AUTO: 91 FL
MONOCYTES # BLD AUTO: 0.5 K/UL
MONOCYTES NFR BLD: 6.9 %
NEUTROPHILS # BLD AUTO: 3.6 K/UL
NEUTROPHILS NFR BLD: 53.1 %
NONHDLC SERPL-MCNC: 91 MG/DL
NRBC BLD-RTO: 0 /100 WBC
PLATELET # BLD AUTO: 180 K/UL
PMV BLD AUTO: 11.4 FL
POTASSIUM SERPL-SCNC: 3.4 MMOL/L
PROT SERPL-MCNC: 7.8 G/DL
PTH-INTACT SERPL-MCNC: 133 PG/ML
RBC # BLD AUTO: 4.99 M/UL
SODIUM SERPL-SCNC: 141 MMOL/L
TRIGL SERPL-MCNC: 112 MG/DL
WBC # BLD AUTO: 6.84 K/UL

## 2019-01-09 PROCEDURE — 82340 ASSAY OF CALCIUM IN URINE: CPT

## 2019-01-09 PROCEDURE — 82570 ASSAY OF URINE CREATININE: CPT

## 2019-01-09 PROCEDURE — 36415 COLL VENOUS BLD VENIPUNCTURE: CPT

## 2019-01-09 PROCEDURE — 85025 COMPLETE CBC W/AUTO DIFF WBC: CPT

## 2019-01-09 PROCEDURE — 11000001 HC ACUTE MED/SURG PRIVATE ROOM

## 2019-01-09 PROCEDURE — 83735 ASSAY OF MAGNESIUM: CPT

## 2019-01-09 PROCEDURE — 25000003 PHARM REV CODE 250: Performed by: HOSPITALIST

## 2019-01-09 PROCEDURE — 63600175 PHARM REV CODE 636 W HCPCS: Performed by: HOSPITALIST

## 2019-01-09 PROCEDURE — 99233 SBSQ HOSP IP/OBS HIGH 50: CPT | Mod: ,,, | Performed by: HOSPITALIST

## 2019-01-09 PROCEDURE — 82306 VITAMIN D 25 HYDROXY: CPT

## 2019-01-09 PROCEDURE — 25000003 PHARM REV CODE 250: Performed by: PHYSICIAN ASSISTANT

## 2019-01-09 PROCEDURE — 99233 PR SUBSEQUENT HOSPITAL CARE,LEVL III: ICD-10-PCS | Mod: ,,, | Performed by: HOSPITALIST

## 2019-01-09 PROCEDURE — 80053 COMPREHEN METABOLIC PANEL: CPT

## 2019-01-09 PROCEDURE — 83970 ASSAY OF PARATHORMONE: CPT

## 2019-01-09 PROCEDURE — 80061 LIPID PANEL: CPT

## 2019-01-09 RX ORDER — ONDANSETRON 2 MG/ML
8 INJECTION INTRAMUSCULAR; INTRAVENOUS EVERY 8 HOURS PRN
Status: DISCONTINUED | OUTPATIENT
Start: 2019-01-09 | End: 2019-01-09

## 2019-01-09 RX ORDER — ONDANSETRON 2 MG/ML
8 INJECTION INTRAMUSCULAR; INTRAVENOUS EVERY 6 HOURS PRN
Status: DISCONTINUED | OUTPATIENT
Start: 2019-01-09 | End: 2019-01-12 | Stop reason: HOSPADM

## 2019-01-09 RX ORDER — ONDANSETRON 4 MG/1
4 TABLET, ORALLY DISINTEGRATING ORAL EVERY 6 HOURS PRN
Status: DISCONTINUED | OUTPATIENT
Start: 2019-01-09 | End: 2019-01-09

## 2019-01-09 RX ORDER — PROMETHAZINE HYDROCHLORIDE 25 MG/1
25 TABLET ORAL EVERY 6 HOURS PRN
Status: DISCONTINUED | OUTPATIENT
Start: 2019-01-09 | End: 2019-01-09

## 2019-01-09 RX ORDER — LOPERAMIDE HYDROCHLORIDE 2 MG/1
2 CAPSULE ORAL 4 TIMES DAILY PRN
Status: DISCONTINUED | OUTPATIENT
Start: 2019-01-09 | End: 2019-01-12 | Stop reason: HOSPADM

## 2019-01-09 RX ORDER — POTASSIUM CHLORIDE 20 MEQ/1
40 TABLET, EXTENDED RELEASE ORAL ONCE
Status: COMPLETED | OUTPATIENT
Start: 2019-01-09 | End: 2019-01-09

## 2019-01-09 RX ADMIN — PROMETHAZINE HYDROCHLORIDE 12.5 MG: 25 INJECTION INTRAMUSCULAR; INTRAVENOUS at 10:01

## 2019-01-09 RX ADMIN — AMLODIPINE BESYLATE 10 MG: 10 TABLET ORAL at 10:01

## 2019-01-09 RX ADMIN — PREDNISONE 10 MG: 10 TABLET ORAL at 10:01

## 2019-01-09 RX ADMIN — ONDANSETRON 4 MG: 4 TABLET, ORALLY DISINTEGRATING ORAL at 05:01

## 2019-01-09 RX ADMIN — PROMETHAZINE HYDROCHLORIDE 25 MG: 25 TABLET ORAL at 09:01

## 2019-01-09 RX ADMIN — TAMSULOSIN HYDROCHLORIDE 0.4 MG: 0.4 CAPSULE ORAL at 10:01

## 2019-01-09 RX ADMIN — ATORVASTATIN CALCIUM 40 MG: 20 TABLET, FILM COATED ORAL at 10:01

## 2019-01-09 RX ADMIN — PANTOPRAZOLE SODIUM 40 MG: 40 TABLET, DELAYED RELEASE ORAL at 10:01

## 2019-01-09 RX ADMIN — POTASSIUM CHLORIDE 40 MEQ: 1500 TABLET, EXTENDED RELEASE ORAL at 10:01

## 2019-01-09 RX ADMIN — ENOXAPARIN SODIUM 40 MG: 100 INJECTION SUBCUTANEOUS at 06:01

## 2019-01-09 RX ADMIN — PROMETHAZINE HYDROCHLORIDE 12.5 MG: 25 INJECTION INTRAMUSCULAR; INTRAVENOUS at 11:01

## 2019-01-09 NOTE — SUBJECTIVE & OBJECTIVE
Interval History: Developed onset of nausea and vomiting overnight, needing IV antiemetics to control. Stool studies negative and still with diarrhea; will attempt loperamide.    Review of Systems   Constitutional: Negative for chills, diaphoresis and fever.   HENT: Negative for congestion and sore throat.    Eyes: Negative for discharge and visual disturbance.   Respiratory: Negative for cough and shortness of breath.    Cardiovascular: Negative for chest pain and leg swelling.   Gastrointestinal: Positive for diarrhea, nausea and vomiting. Negative for abdominal pain.   Genitourinary: Negative for dysuria and flank pain.   Musculoskeletal: Negative for arthralgias and joint swelling.   Skin: Negative for rash and wound.   Allergic/Immunologic: Positive for immunocompromised state.   Neurological: Negative for light-headedness and headaches.   Psychiatric/Behavioral: Negative for agitation and confusion.     Objective:     Vital Signs (Most Recent):  Temp: 97 °F (36.1 °C) (01/09/19 1230)  Pulse: 95 (01/09/19 1230)  Resp: 18 (01/09/19 1230)  BP: 129/74 (01/09/19 1230)  SpO2: 97 % (01/09/19 1230) Vital Signs (24h Range):  Temp:  [96.3 °F (35.7 °C)-97.9 °F (36.6 °C)] 97 °F (36.1 °C)  Pulse:  [50-95] 95  Resp:  [17-18] 18  SpO2:  [95 %-99 %] 97 %  BP: (105-167)/(52-83) 129/74     Weight: 122.9 kg (271 lb)  Body mass index is 40.02 kg/m².    Intake/Output Summary (Last 24 hours) at 1/9/2019 1523  Last data filed at 1/8/2019 2151  Gross per 24 hour   Intake 240 ml   Output --   Net 240 ml      Physical Exam   Constitutional: He is oriented to person, place, and time. He appears well-developed and well-nourished. No distress.   obese   HENT:   Head: Normocephalic and atraumatic.   Nose: Nose normal.   Eyes: EOM are normal. Pupils are equal, round, and reactive to light. No scleral icterus.   Neck: Normal range of motion. No JVD present. No tracheal deviation present.   Cardiovascular: Normal rate, regular rhythm and  normal heart sounds. Exam reveals no gallop and no friction rub.   No murmur heard.  Pulmonary/Chest: Effort normal and breath sounds normal. No respiratory distress.   Abdominal: Soft. He exhibits no distension and no mass. There is no tenderness. No hernia.   Musculoskeletal: He exhibits no edema or deformity.   Neurological: He is alert and oriented to person, place, and time.   Skin: Skin is warm and dry. No rash noted. He is not diaphoretic.   Psychiatric: He has a normal mood and affect. His behavior is normal.   Vitals reviewed.      Significant Labs:   Recent Lab Results       01/09/19  0440        Immature Granulocytes 0.1     Immature Grans (Abs) 0.01  Comment:  Mild elevation in immature granulocytes is non specific and   can be seen in a variety of conditions including stress response,   acute inflammation, trauma and pregnancy. Correlation with other   laboratory and clinical findings is essential.       Albumin 3.7     Alkaline Phosphatase 84     ALT 43     Anion Gap 11     AST 37     Baso # 0.01     Basophil% 0.1     Total Bilirubin 0.9  Comment:  For infants and newborns, interpretation of results should be based  on gestational age, weight and in agreement with clinical  observations.  Premature Infant recommended reference ranges:  Up to 24 hours.............<8.0 mg/dL  Up to 48 hours............<12.0 mg/dL  3-5 days..................<15.0 mg/dL  6-29 days.................<15.0 mg/dL       BUN, Bld 10     Calcium 11.2     Chloride 106     Cholesterol 122  Comment:  The National Cholesterol Education Program (NCEP) has set the  following guidelines (reference ranges) for Cholesterol:  Optimal.....................<200 mg/dL  Borderline High.............200-239 mg/dL  High........................> or = 240 mg/dL       CO2 24     Creatinine 0.9     Differential Method Automated     eGFR if African American >60.0     eGFR if non  >60.0  Comment:  Calculation used to obtain the estimated  glomerular filtration  rate (eGFR) is the CKD-EPI equation.        Eos # 0.0     Eosinophil% 0.3     Glucose 96     Gran # (ANC) 3.6     Gran% 53.1     HDL 31  Comment:  The National Cholesterol Education Program (NCEP) has set the  following guidelines (reference values) for HDL Cholesterol:  Low...............<40 mg/dL  Optimal...........>60 mg/dL       HDL/Chol Ratio 25.4     Hematocrit 45.5     Hemoglobin 15.4     LDL Cholesterol 68.6  Comment:  The National Cholesterol Education Program (NCEP) has set the  following guidelines (reference values) for LDL Cholesterol:  Optimal.......................<130 mg/dL  Borderline High...............130-159 mg/dL  High..........................160-189 mg/dL  Very High.....................>190 mg/dL       Lymph # 2.7     Lymph% 39.5     Magnesium 2.1     MCH 30.9     MCHC 33.8     MCV 91     Mono # 0.5     Mono% 6.9     MPV 11.4     Non-HDL Cholesterol 91  Comment:  Risk category and Non-HDL cholesterol goals:  Coronary heart disease (CHD)or equivalent (10-year risk of CHD >20%):  Non-HDL cholesterol goal     <130 mg/dL  Two or more CHD risk factors and 10-year risk of CHD <= 20%:  Non-HDL cholesterol goal     <160 mg/dL  0 to 1 CHD risk factor:  Non-HDL cholesterol goal     <190 mg/dL       nRBC 0     Platelets 180     Potassium 3.4     Total Protein 7.8     .0     RBC 4.99     RDW 13.1     Sodium 141     Total Cholesterol/HDL Ratio 3.9     Triglycerides 112  Comment:  The National Cholesterol Education Program (NCEP) has set the  following guidelines (reference values) for triglycerides:  Normal......................<150 mg/dL  Borderline High.............150-199 mg/dL  High........................200-499 mg/dL       Vit D, 25-Hydroxy 16  Comment:  Vitamin D deficiency.........<10 ng/mL                              Vitamin D insufficiency......10-29 ng/mL       Vitamin D sufficiency........> or equal to 30 ng/mL  Vitamin D toxicity............>100 ng/mL       WBC  6.84         All pertinent labs within the past 24 hours have been reviewed.    Significant Imaging: I have reviewed all pertinent imaging results/findings within the past 24 hours.

## 2019-01-09 NOTE — ASSESSMENT & PLAN NOTE
- unclear origin; does historically have elevated Ca in the past  - ddx includes iatrogenic (patient reports taking Ca supplements) vs medication effect vs malignancy vs hyperparathyroid  - holding HCTZ, home Ca supplements  - check PTH, Vit D for now  - 1L LR as above

## 2019-01-09 NOTE — ASSESSMENT & PLAN NOTE
- mildly elevated AST/ALT and t bili  - largely c/w baseline, improved today  - continue to monitor  - consider repeat abdominal US if not improving, but was done <1 month ago and normal at that time

## 2019-01-09 NOTE — NURSING
Paged Med Team O, patient still vomiting, request another medication  for vomiting, in addition to zofran. Sanaz Rangel LPN

## 2019-01-09 NOTE — PLAN OF CARE
Problem: Adult Inpatient Plan of Care  Goal: Plan of Care Review  Outcome: Ongoing (interventions implemented as appropriate)  No acute changes overnight. Cdiff negative. 1L bolus administered as ordered. Pt denies pain. Call light in reach. WCTM

## 2019-01-09 NOTE — ASSESSMENT & PLAN NOTE
- incidental 0.6cm kidney stone in distal ureter found upon CT scan  - does have history of renal stones in the past and some microscopic hematuria on UA  - 1L LR as above  - initiate tamsulosin 0.4mg daily  - can f/u with PCP on discharge

## 2019-01-09 NOTE — ASSESSMENT & PLAN NOTE
- acute onset over 1 day, watery  - will check C. Diff given recent use of immunosuppressant therapy; additional stool studies ordered in ED  - will consider loperamide if negative infectious w/u  - CT a/p without any evidence of colitis

## 2019-01-09 NOTE — ASSESSMENT & PLAN NOTE
- mildly elevated AST/ALT and t bili  - largely c/w baseline, will fractionate bilirubin  - continue to monitor  - consider repeat abdominal US if not improving, but was done <1 month ago and normal at that time

## 2019-01-09 NOTE — ASSESSMENT & PLAN NOTE
- continue home prednisone  - continue holding etanercept for now pending infectious w/u and outpatient Rheum f/u

## 2019-01-09 NOTE — PLAN OF CARE
01/09/19 0849   Discharge Assessment   Assessment Type Discharge Planning Assessment   Confirmed/corrected address and phone number on facesheet? Yes   Assessment information obtained from? Patient;Medical Record   Expected Length of Stay (days) 2   Communicated expected length of stay with patient/caregiver yes  (Per MD)   Prior to hospitilization cognitive status: No Deficits;Alert/Oriented   Prior to hospitalization functional status: Independent   Current cognitive status: Alert/Oriented;No Deficits   Current Functional Status: Independent   Lives With other (see comments)  (Patient lives with Baylee Castillo (Friend))   Able to Return to Prior Arrangements yes   Is patient able to care for self after discharge? Yes   Who are your caregiver(s) and their phone number(s)? Baylee Castillo (Friend) - [phone]   Patient's perception of discharge disposition home or selfcare   Readmission Within the Last 30 Days no previous admission in last 30 days   Patient currently being followed by outpatient case management? No   Patient currently receives any other outside agency services? No   Equipment Currently Used at Home none   Do you have any problems affording any of your prescribed medications? No   Is the patient taking medications as prescribed? yes   Does the patient have transportation home? Yes   Transportation Anticipated family or friend will provide   Dialysis Name and Scheduled days N/A   Does the patient receive services at the [facility]? No   Discharge Plan A Home   Discharge Plan B Home;Home Health   DME Needed Upon Discharge  none   Patient/Family in Agreement with Plan yes        01/09/19 0849   Discharge Assessment   Assessment Type Discharge Planning Assessment   Confirmed/corrected address and phone number on facesheet? Yes   Assessment information obtained from? Patient;Medical Record   Expected Length of Stay (days) 2   Communicated expected length of stay with patient/caregiver  yes  (Per MD)   Prior to hospitilization cognitive status: No Deficits;Alert/Oriented   Prior to hospitalization functional status: Independent   Current cognitive status: Alert/Oriented;No Deficits   Current Functional Status: Independent   Lives With other (see comments)  (Patient lives with Baylee Castillo (Friend))   Able to Return to Prior Arrangements yes   Is patient able to care for self after discharge? Yes   Who are your caregiver(s) and their phone number(s)? Baylee Castillo (Friend) - [phone]   Patient's perception of discharge disposition home or selfcare   Readmission Within the Last 30 Days no previous admission in last 30 days   Patient currently being followed by outpatient case management? No   Patient currently receives any other outside agency services? No   Equipment Currently Used at Home none   Do you have any problems affording any of your prescribed medications? No   Is the patient taking medications as prescribed? yes   Does the patient have transportation home? Yes   Transportation Anticipated family or friend will provide   Dialysis Name and Scheduled days N/A   Does the patient receive services at the [facility]? No   Discharge Plan A Home   Discharge Plan B Home;Home Health   DME Needed Upon Discharge  none   Patient/Family in Agreement with Plan yes     CM met with the patient at the bedside. He states he resides with his friend Baylee in a single story home that has [address]UR[address] with railings. Patient denies the use of DME to assist with daily needs. Patient states he is independent and able to care for himself. CM placed name and number on board in the room and the patient was instructed to call if there are any D/C needs or questions. Understanding verbalized.

## 2019-01-09 NOTE — ASSESSMENT & PLAN NOTE
- acute onset over 1 day, watery  - stool studies, including C. Diff, negative  - likely 2/2 acute viral gastroenteritis  - initiate loperamide and antiemetics  - CT a/p without any evidence of colitis

## 2019-01-09 NOTE — ASSESSMENT & PLAN NOTE
- unclear origin; does historically have elevated Ca in the past  - ddx includes iatrogenic (patient reports taking Ca supplements) vs medication effect vs malignancy vs hyperparathyroid  - holding HCTZ, home Ca supplements  - PTH elevated, vitamin D low; c/w PHP  - will initiate vitamin D supplements  - given history of renal stones, may benefit from further evaluation and possibly surgery  - will need to f/u with PCP

## 2019-01-09 NOTE — ASSESSMENT & PLAN NOTE
- unclear if patient actually has pancreatitis; does have elevated lipase/amylase but no abdominal pain or evidence of inflammation on CT  - recent abdominal US w/o any gallstones; also not visualized on CT, will defer repeat imaging for now  - does have elevated Ca (see below), which can cause pancreatitis, but would be very unusual presentation  - could be 2/2 diarrhea/GI source; less likely malignancy given lack of any other symptoms (albiet with hyperCa)  - can pursue further workup if necessary, but would wait for something to declare itself as this could be 2/2 acute gastroenteritis alone  - will give 1L LR for now

## 2019-01-09 NOTE — ASSESSMENT & PLAN NOTE
- unclear if patient actually has pancreatitis; does have elevated lipase to 567 but no abdominal pain or evidence of inflammation on CT  - recent abdominal US w/o any gallstones; also not visualized on CT, will defer repeat imaging for now  - does have elevated Ca (see below), which can cause pancreatitis, but would be very unusual presentation  - could be 2/2 diarrhea/GI source; less likely malignancy given lack of any other symptoms (albiet with hyperCa)  - check amylase and lipids  - can pursue further workup if necessary  - will give 1L LR for now

## 2019-01-09 NOTE — H&P
[facility] Hospital Medicine  History & Physical    Patient Name: Massimo Funez  MRN: [id number]  Admission Date: 1/8/2019  Attending Physician: Mohit Phelan, *   Primary Care Provider: Corey Kwok MD    [facility] Medicine Team: [facility]OSP MED O Mohit Phelan MD     Patient information was obtained from patient, spouse/SO, past medical records and ER records.     Subjective:     Principal Problem:Acute diarrhea    Chief Complaint:   Chief Complaint   Patient presents with    Multiple Complaints     L leg tingling for past few nights, diarrhea, fatty liver        HPI: Mr. Funez is a 57yo man with ALBARADO, RA on prednisone and etanercept, and history of nephrolithiasis who presents to the ED with 1 day of nausea and loose diarrhea. He states that yesterday evening he started experiencing some mild nausea without emesis and began having watery diarrhea. Reports multiple episodes of loose, watery yellow stool. Non-bloody, no melena. No associated abdominal pain; although he does report some vague discomfort. Diarrhea has continued this am. Otherwise, he is afebrile and with no other acute complaints. States he has been eating healthier lately after being diagnosed with ALBARADO and has stopped drinking and smoking. Did note episode of reflux 3d ago, but otherwise has been in normal state of health.    He recently started etanercept for his RA and has taken one dose so far on 12/20; additional doses were held due to concern that he had a cold and his wife had the flu so he did not want to risk an infection.    Past Medical History:   Diagnosis Date    Hyperlipidemia     Hypertension     Rheumatoid arthritis     Rheumatoid arthritis involving multiple sites with positive rheumatoid factor 4/16/2018    Syphilis        Past Surgical History:   Procedure Laterality Date    LIVER BIOPSY         Review of patient's allergies indicates:  No Known Allergies    No current  facility-administered medications on file prior to encounter.      Current Outpatient Medications on File Prior to Encounter   Medication Sig    amlodipine-atorvastatin (CADUET) 10-40 mg per tablet Take 1 tablet by mouth once daily.     etanercept (ENBREL SURECLICK) 50 mg/mL (0.98 mL) PnIj Inject 50 mg into the skin every 7 days    fish oil-omega-3 fatty acids 300-1,000 mg capsule Take 2 g by mouth once daily.    furosemide (LASIX) 20 MG tablet Take 20 mg by mouth as needed.     hydroCHLOROthiazide (HYDRODIURIL) 25 MG tablet Take 1 tablet (25 mg total) by mouth once daily.    omeprazole (PRILOSEC) 20 MG capsule Take 1 capsule (20 mg total) by mouth once daily.    predniSONE (DELTASONE) 10 MG tablet Take 10 mg by mouth once daily.     sildenafil (VIAGRA) 50 MG tablet Take 50 mg by mouth.     Family History     Problem Relation (Age of Onset)    Diabetes Mellitus Mother    Hypertension Father    Inflammatory bowel disease Mother    Osteoarthritis Mother    Stroke Father        Tobacco Use    Smoking status: Former Smoker    Smokeless tobacco: Never Used   Substance and Sexual Activity    Alcohol use: No     Frequency: Never    Drug use: No    Sexual activity: Yes     Review of Systems   Constitutional: Negative for chills, diaphoresis and fever.   HENT: Negative for congestion and sore throat.    Eyes: Negative for discharge and visual disturbance.   Respiratory: Negative for cough and shortness of breath.    Cardiovascular: Negative for chest pain and leg swelling.   Gastrointestinal: Positive for diarrhea and nausea. Negative for abdominal pain and vomiting.   Genitourinary: Negative for dysuria and flank pain.   Musculoskeletal: Negative for arthralgias and joint swelling.   Skin: Negative for rash and wound.   Allergic/Immunologic: Negative for immunocompromised state.   Neurological: Negative for light-headedness and headaches.   Psychiatric/Behavioral: Negative for agitation and confusion.      Objective:     Vital Signs (Most Recent):  Temp: 97.1 °F (36.2 °C) (01/08/19 2027)  Pulse: 61 (01/08/19 2027)  Resp: 18 (01/08/19 2027)  BP: 118/67 (01/08/19 2027)  SpO2: 97 % (01/08/19 2027) Vital Signs (24h Range):  Temp:  [97.1 °F (36.2 °C)-98.7 °F (37.1 °C)] 97.1 °F (36.2 °C)  Pulse:  [50-82] 61  Resp:  [16-18] 18  SpO2:  [95 %-100 %] 97 %  BP: (116-167)/(67-88) 118/67     Weight: 122.9 kg (271 lb)  Body mass index is 40.02 kg/m².    Physical Exam   Constitutional: He is oriented to person, place, and time. He appears well-developed and well-nourished. No distress.   obese   HENT:   Head: Normocephalic and atraumatic.   Nose: Nose normal.   Eyes: EOM are normal. Pupils are equal, round, and reactive to light. No scleral icterus.   Neck: Normal range of motion. No JVD present. No tracheal deviation present.   Cardiovascular: Normal rate, regular rhythm and normal heart sounds. Exam reveals no gallop and no friction rub.   No murmur heard.  Pulmonary/Chest: Effort normal and breath sounds normal. No respiratory distress.   Abdominal: Soft. He exhibits no distension and no mass. There is no tenderness. No hernia.   Musculoskeletal: He exhibits no edema or deformity.   Neurological: He is alert and oriented to person, place, and time.   Skin: Skin is warm and dry. No rash noted. He is not diaphoretic.   Psychiatric: He has a normal mood and affect. His behavior is normal.   Vitals reviewed.        CRANIAL NERVES     CN III, IV, VI   Pupils are equal, round, and reactive to light.  Extraocular motions are normal.        Significant Labs:   Recent Lab Results       01/08/19  1048   01/08/19  0954        Immature Granulocytes   0.3     Immature Grans (Abs)   0.02  Comment:  Mild elevation in immature granulocytes is non specific and   can be seen in a variety of conditions including stress response,   acute inflammation, trauma and pregnancy. Correlation with other   laboratory and clinical findings is essential.        Albumin   4.2     Alkaline Phosphatase   92     ALT   43     Anion Gap   10     Appearance, UA Hazy       AST   42     Bacteria, UA Moderate       Baso #   0.01     Basophil%   0.1     Bilirubin (UA) Negative       Total Bilirubin   1.2  Comment:  For infants and newborns, interpretation of results should be based  on gestational age, weight and in agreement with clinical  observations.  Premature Infant recommended reference ranges:  Up to 24 hours.............<8.0 mg/dL  Up to 48 hours............<12.0 mg/dL  3-5 days..................<15.0 mg/dL  6-29 days.................<15.0 mg/dL       BUN, Bld   10     Calcium   11.5     Chloride   103     CO2   27     Color, UA Yellow       Creatinine   1.1     Differential Method   Automated     eGFR if    >60.0     eGFR if non    >60.0  Comment:  Calculation used to obtain the estimated glomerular filtration  rate (eGFR) is the CKD-EPI equation.        Eos #   0.0     Eosinophil%   0.1     Estimated Avg Glucose   126     Glucose   101     Glucose, UA Negative       Gran # (ANC)   4.7     Gran%   65.2     Hematocrit   46.4     Hemoglobin   15.6     Hemoglobin A1C   6.0  Comment:  ADA Screening Guidelines:  5.7-6.4%  Consistent with prediabetes  >or=6.5%  Consistent with diabetes  High levels of fetal hemoglobin interfere with the HbA1C  assay. Heterozygous hemoglobin variants (HbS, HgC, etc)do  not significantly interfere with this assay.   However, presence of multiple variants may affect accuracy.       Hyaline Casts, UA 0       Coumadin Monitoring INR   1.0  Comment:  Coumadin Therapy:  2.0 - 3.0 for INR for all indicators except mechanical heart valves  and antiphospholipid syndromes which should use 2.5 - 3.5.       Ketones, UA Trace       Leukocytes, UA 1+       Lipase   567     Lymph #   1.9     Lymph%   26.8     Magnesium   2.3     MCH   29.9     MCHC   33.6     MCV   89     Microscopic Comment SEE COMMENT  Comment:  Other formed  elements not mentioned in the report are not   present in the microscopic examination.          Mono #   0.5     Mono%   7.5     MPV   11.9     Nitrite, UA Negative       nRBC   0     Occult Blood UA 2+       pH, UA 5.0       Platelets   194     Potassium   3.2     Total Protein   8.8     Protein, UA 1+  Comment:  Recommend a 24 hour urine protein or a urine   protein/creatinine ratio if globulin induced proteinuria is  clinically suspected.         Protime   10.4     RBC   5.22     RBC, UA 34       RDW   12.9     Sodium   140     Specific Gravity, UA 1.015       Specimen UA Urine, Clean Catch       WBC, UA 13       Stool WBC No neutrophils seen       WBC   7.19         All pertinent labs within the past 24 hours have been reviewed.    Significant Imaging: I have reviewed all pertinent imaging results/findings within the past 24 hours.    Assessment/Plan:     * Acute diarrhea    - acute onset over 1 day, watery  - will check C. Diff given recent use of immunosuppressant therapy; additional stool studies ordered in ED  - will consider loperamide if negative infectious w/u  - CT a/p without any evidence of colitis       Pancreatitis    - unclear if patient actually has pancreatitis; does have elevated lipase to 567 but no abdominal pain or evidence of inflammation on CT  - recent abdominal US w/o any gallstones; also not visualized on CT, will defer repeat imaging for now  - does have elevated Ca (see below), which can cause pancreatitis, but would be very unusual presentation  - could be 2/2 diarrhea/GI source; less likely malignancy given lack of any other symptoms (albiet with hyperCa)  - check amylase and lipids  - can pursue further workup if necessary  - will give 1L LR for now       Hypercalcemia    - unclear origin; does historically have elevated Ca in the past  - ddx includes iatrogenic (patient reports taking Ca supplements) vs medication effect vs malignancy vs hyperparathyroid  - holding HCTZ, home Ca  supplements  - check PTH, Vit D for now  - 1L LR as above       Rheumatoid arthritis involving multiple sites with positive rheumatoid factor    - continue home prednisone  - continue holding etanercept for now pending infectious w/u and outpatient Rheum f/u       ALBARADO (nonalcoholic steatohepatitis)    - mildly elevated AST/ALT and t bili  - largely c/w baseline, will fractionate bilirubin  - continue to monitor  - consider repeat abdominal US if not improving, but was done <1 month ago and normal at that time       Nephrolithiasis    - incidental 0.6cm kidney stone in distal ureter found upon CT scan  - does have history of renal stones in the past and some microscopic hematuria on UA  - 1L LR as above  - initiate tamsulosin 0.4mg daily  - can f/u with PCP on discharge       Hyperlipidemia    - continue atorvastatin 40       Osteoarthritis    - continue RA treatment       Hypertension    - currently stable  - holding HCTZ  - cotninue amlodipine 10 and lasix 20 every other day         VTE Risk Mitigation (From admission, onward)        Ordered     enoxaparin injection 40 mg  Daily      01/08/19 1534     IP VTE HIGH RISK PATIENT  Once      01/08/19 1534             Mohit Phelan MD  Department of Hospital Medicine   [facility]

## 2019-01-09 NOTE — ASSESSMENT & PLAN NOTE
- developed nausea and vomiting overnight, suspect viral gastroenteritis as cause  - IV zofran and phenergan, can alternate dosages for further coverage

## 2019-01-09 NOTE — PROGRESS NOTES
[facility] Hospital Medicine  Progress Note    Patient Name: Massimo Funez  MRN: [id number]  Patient Class: IP- Inpatient   Admission Date: 1/8/2019  Length of Stay: 1 days  Attending Physician: Mohit Phelan, *  Primary Care Provider: Corey Kwok MD    [facility] Medicine Team: [facility]OSP MED O Mohit Phelan MD    Subjective:     Principal Problem:Acute diarrhea    HPI:  Mr. Funez is a 59yo man with ALBARADO, RA on prednisone and etanercept, and history of nephrolithiasis who presents to the ED with 1 day of nausea and loose diarrhea. He states that yesterday evening he started experiencing some mild nausea without emesis and began having watery diarrhea. Reports multiple episodes of loose, watery yellow stool. Non-bloody, no melena. No associated abdominal pain; although he does report some vague discomfort. Diarrhea has continued this am. Otherwise, he is afebrile and with no other acute complaints. States he has been eating healthier lately after being diagnosed with ALBARADO and has stopped drinking and smoking. Did note episode of reflux 3d ago, but otherwise has been in normal state of health.    He recently started etanercept for his RA and has taken one dose so far on 12/20; additional doses were held due to concern that he had a cold and his wife had the flu so he did not want to risk an infection.    Hospital Course:  No notes on file    Interval History: Developed onset of nausea and vomiting overnight, needing IV antiemetics to control. Stool studies negative and still with diarrhea; will attempt loperamide.    Review of Systems   Constitutional: Negative for chills, diaphoresis and fever.   HENT: Negative for congestion and sore throat.    Eyes: Negative for discharge and visual disturbance.   Respiratory: Negative for cough and shortness of breath.    Cardiovascular: Negative for chest pain and leg swelling.   Gastrointestinal: Positive for diarrhea, nausea  and vomiting. Negative for abdominal pain.   Genitourinary: Negative for dysuria and flank pain.   Musculoskeletal: Negative for arthralgias and joint swelling.   Skin: Negative for rash and wound.   Allergic/Immunologic: Positive for immunocompromised state.   Neurological: Negative for light-headedness and headaches.   Psychiatric/Behavioral: Negative for agitation and confusion.     Objective:     Vital Signs (Most Recent):  Temp: 97 °F (36.1 °C) (01/09/19 1230)  Pulse: 95 (01/09/19 1230)  Resp: 18 (01/09/19 1230)  BP: 129/74 (01/09/19 1230)  SpO2: 97 % (01/09/19 1230) Vital Signs (24h Range):  Temp:  [96.3 °F (35.7 °C)-97.9 °F (36.6 °C)] 97 °F (36.1 °C)  Pulse:  [50-95] 95  Resp:  [17-18] 18  SpO2:  [95 %-99 %] 97 %  BP: (105-167)/(52-83) 129/74     Weight: 122.9 kg (271 lb)  Body mass index is 40.02 kg/m².    Intake/Output Summary (Last 24 hours) at 1/9/2019 1523  Last data filed at 1/8/2019 2151  Gross per 24 hour   Intake 240 ml   Output --   Net 240 ml      Physical Exam   Constitutional: He is oriented to person, place, and time. He appears well-developed and well-nourished. No distress.   obese   HENT:   Head: Normocephalic and atraumatic.   Nose: Nose normal.   Eyes: EOM are normal. Pupils are equal, round, and reactive to light. No scleral icterus.   Neck: Normal range of motion. No JVD present. No tracheal deviation present.   Cardiovascular: Normal rate, regular rhythm and normal heart sounds. Exam reveals no gallop and no friction rub.   No murmur heard.  Pulmonary/Chest: Effort normal and breath sounds normal. No respiratory distress.   Abdominal: Soft. He exhibits no distension and no mass. There is no tenderness. No hernia.   Musculoskeletal: He exhibits no edema or deformity.   Neurological: He is alert and oriented to person, place, and time.   Skin: Skin is warm and dry. No rash noted. He is not diaphoretic.   Psychiatric: He has a normal mood and affect. His behavior is normal.   Vitals  reviewed.      Significant Labs:   Recent Lab Results       01/09/19  0440        Immature Granulocytes 0.1     Immature Grans (Abs) 0.01  Comment:  Mild elevation in immature granulocytes is non specific and   can be seen in a variety of conditions including stress response,   acute inflammation, trauma and pregnancy. Correlation with other   laboratory and clinical findings is essential.       Albumin 3.7     Alkaline Phosphatase 84     ALT 43     Anion Gap 11     AST 37     Baso # 0.01     Basophil% 0.1     Total Bilirubin 0.9  Comment:  For infants and newborns, interpretation of results should be based  on gestational age, weight and in agreement with clinical  observations.  Premature Infant recommended reference ranges:  Up to 24 hours.............<8.0 mg/dL  Up to 48 hours............<12.0 mg/dL  3-5 days..................<15.0 mg/dL  6-29 days.................<15.0 mg/dL       BUN, Bld 10     Calcium 11.2     Chloride 106     Cholesterol 122  Comment:  The National Cholesterol Education Program (NCEP) has set the  following guidelines (reference ranges) for Cholesterol:  Optimal.....................<200 mg/dL  Borderline High.............200-239 mg/dL  High........................> or = 240 mg/dL       CO2 24     Creatinine 0.9     Differential Method Automated     eGFR if African American >60.0     eGFR if non  >60.0  Comment:  Calculation used to obtain the estimated glomerular filtration  rate (eGFR) is the CKD-EPI equation.        Eos # 0.0     Eosinophil% 0.3     Glucose 96     Gran # (ANC) 3.6     Gran% 53.1     HDL 31  Comment:  The National Cholesterol Education Program (NCEP) has set the  following guidelines (reference values) for HDL Cholesterol:  Low...............<40 mg/dL  Optimal...........>60 mg/dL       HDL/Chol Ratio 25.4     Hematocrit 45.5     Hemoglobin 15.4     LDL Cholesterol 68.6  Comment:  The National Cholesterol Education Program (NCEP) has set the  following  guidelines (reference values) for LDL Cholesterol:  Optimal.......................<130 mg/dL  Borderline High...............130-159 mg/dL  High..........................160-189 mg/dL  Very High.....................>190 mg/dL       Lymph # 2.7     Lymph% 39.5     Magnesium 2.1     MCH 30.9     MCHC 33.8     MCV 91     Mono # 0.5     Mono% 6.9     MPV 11.4     Non-HDL Cholesterol 91  Comment:  Risk category and Non-HDL cholesterol goals:  Coronary heart disease (CHD)or equivalent (10-year risk of CHD >20%):  Non-HDL cholesterol goal     <130 mg/dL  Two or more CHD risk factors and 10-year risk of CHD <= 20%:  Non-HDL cholesterol goal     <160 mg/dL  0 to 1 CHD risk factor:  Non-HDL cholesterol goal     <190 mg/dL       nRBC 0     Platelets 180     Potassium 3.4     Total Protein 7.8     .0     RBC 4.99     RDW 13.1     Sodium 141     Total Cholesterol/HDL Ratio 3.9     Triglycerides 112  Comment:  The National Cholesterol Education Program (NCEP) has set the  following guidelines (reference values) for triglycerides:  Normal......................<150 mg/dL  Borderline High.............150-199 mg/dL  High........................200-499 mg/dL       Vit D, 25-Hydroxy 16  Comment:  Vitamin D deficiency.........<10 ng/mL                              Vitamin D insufficiency......10-29 ng/mL       Vitamin D sufficiency........> or equal to 30 ng/mL  Vitamin D toxicity............>100 ng/mL       WBC 6.84         All pertinent labs within the past 24 hours have been reviewed.    Significant Imaging: I have reviewed all pertinent imaging results/findings within the past 24 hours.    Assessment/Plan:      * Acute diarrhea    - acute onset over 1 day, watery  - stool studies, including C. Diff, negative  - likely 2/2 acute viral gastroenteritis  - initiate loperamide and antiemetics  - CT a/p without any evidence of colitis       Elevated amylase and lipase    - unclear if patient actually has pancreatitis; does have  elevated lipase/amylase but no abdominal pain or evidence of inflammation on CT  - recent abdominal US w/o any gallstones; also not visualized on CT, will defer repeat imaging for now  - does have elevated Ca (see below), which can cause pancreatitis, but would be very unusual presentation  - could be 2/2 diarrhea/GI source; less likely malignancy given lack of any other symptoms (albiet with hyperCa)  - can pursue further workup if necessary, but would wait for something to declare itself as this could be 2/2 acute gastroenteritis alone  - will give 1L LR for now       Hypercalcemia    - unclear origin; does historically have elevated Ca in the past  - ddx includes iatrogenic (patient reports taking Ca supplements) vs medication effect vs malignancy vs hyperparathyroid  - holding HCTZ, home Ca supplements  - PTH elevated, vitamin D low; c/w PHP  - will initiate vitamin D supplements  - given history of renal stones, may benefit from further evaluation and possibly surgery  - will need to f/u with PCP       Rheumatoid arthritis involving multiple sites with positive rheumatoid factor    - continue home prednisone  - continue holding etanercept for now pending infectious w/u and outpatient Rheum f/u       ALBARADO (nonalcoholic steatohepatitis)    - mildly elevated AST/ALT and t bili  - largely c/w baseline, improved today  - continue to monitor  - consider repeat abdominal US if not improving, but was done <1 month ago and normal at that time       Nephrolithiasis    - incidental 0.6cm kidney stone in distal ureter found upon CT scan  - does have history of renal stones in the past and some microscopic hematuria on UA  - 1L LR as above  - continue tamsulosin 0.4mg daily  - can f/u with PCP on discharge, may need evaluation for hyperparathyroidism       Viral gastroenteritis    - see diarrhea       Intractable vomiting with nausea    - developed nausea and vomiting overnight, suspect viral gastroenteritis as cause  - IV  zofran and phenergan, can alternate dosages for further coverage     Hyperlipidemia    - continue atorvastatin 40       Osteoarthritis    - continue RA treatment       Hypertension    - currently stable  - holding HCTZ  - cotninue amlodipine 10 and lasix 20 every other day         VTE Risk Mitigation (From admission, onward)        Ordered     enoxaparin injection 40 mg  Daily      01/08/19 1534     IP VTE HIGH RISK PATIENT  Once      01/08/19 4194              Mohit Phelan MD  Department of Hospital Medicine   [facility]

## 2019-01-09 NOTE — SUBJECTIVE & OBJECTIVE
Past Medical History:   Diagnosis Date    Hyperlipidemia     Hypertension     Rheumatoid arthritis     Rheumatoid arthritis involving multiple sites with positive rheumatoid factor 4/16/2018    Syphilis        Past Surgical History:   Procedure Laterality Date    LIVER BIOPSY         Review of patient's allergies indicates:  No Known Allergies    No current facility-administered medications on file prior to encounter.      Current Outpatient Medications on File Prior to Encounter   Medication Sig    amlodipine-atorvastatin (CADUET) 10-40 mg per tablet Take 1 tablet by mouth once daily.     etanercept (ENBREL SURECLICK) 50 mg/mL (0.98 mL) PnIj Inject 50 mg into the skin every 7 days    fish oil-omega-3 fatty acids 300-1,000 mg capsule Take 2 g by mouth once daily.    furosemide (LASIX) 20 MG tablet Take 20 mg by mouth as needed.     hydroCHLOROthiazide (HYDRODIURIL) 25 MG tablet Take 1 tablet (25 mg total) by mouth once daily.    omeprazole (PRILOSEC) 20 MG capsule Take 1 capsule (20 mg total) by mouth once daily.    predniSONE (DELTASONE) 10 MG tablet Take 10 mg by mouth once daily.     sildenafil (VIAGRA) 50 MG tablet Take 50 mg by mouth.     Family History     Problem Relation (Age of Onset)    Diabetes Mellitus Mother    Hypertension Father    Inflammatory bowel disease Mother    Osteoarthritis Mother    Stroke Father        Tobacco Use    Smoking status: Former Smoker    Smokeless tobacco: Never Used   Substance and Sexual Activity    Alcohol use: No     Frequency: Never    Drug use: No    Sexual activity: Yes     Review of Systems   Constitutional: Negative for chills, diaphoresis and fever.   HENT: Negative for congestion and sore throat.    Eyes: Negative for discharge and visual disturbance.   Respiratory: Negative for cough and shortness of breath.    Cardiovascular: Negative for chest pain and leg swelling.   Gastrointestinal: Positive for diarrhea and nausea. Negative for abdominal pain  and vomiting.   Genitourinary: Negative for dysuria and flank pain.   Musculoskeletal: Negative for arthralgias and joint swelling.   Skin: Negative for rash and wound.   Allergic/Immunologic: Negative for immunocompromised state.   Neurological: Negative for light-headedness and headaches.   Psychiatric/Behavioral: Negative for agitation and confusion.     Objective:     Vital Signs (Most Recent):  Temp: 97.1 °F (36.2 °C) (01/08/19 2027)  Pulse: 61 (01/08/19 2027)  Resp: 18 (01/08/19 2027)  BP: 118/67 (01/08/19 2027)  SpO2: 97 % (01/08/19 2027) Vital Signs (24h Range):  Temp:  [97.1 °F (36.2 °C)-98.7 °F (37.1 °C)] 97.1 °F (36.2 °C)  Pulse:  [50-82] 61  Resp:  [16-18] 18  SpO2:  [95 %-100 %] 97 %  BP: (116-167)/(67-88) 118/67     Weight: 122.9 kg (271 lb)  Body mass index is 40.02 kg/m².    Physical Exam   Constitutional: He is oriented to person, place, and time. He appears well-developed and well-nourished. No distress.   obese   HENT:   Head: Normocephalic and atraumatic.   Nose: Nose normal.   Eyes: EOM are normal. Pupils are equal, round, and reactive to light. No scleral icterus.   Neck: Normal range of motion. No JVD present. No tracheal deviation present.   Cardiovascular: Normal rate, regular rhythm and normal heart sounds. Exam reveals no gallop and no friction rub.   No murmur heard.  Pulmonary/Chest: Effort normal and breath sounds normal. No respiratory distress.   Abdominal: Soft. He exhibits no distension and no mass. There is no tenderness. No hernia.   Musculoskeletal: He exhibits no edema or deformity.   Neurological: He is alert and oriented to person, place, and time.   Skin: Skin is warm and dry. No rash noted. He is not diaphoretic.   Psychiatric: He has a normal mood and affect. His behavior is normal.   Vitals reviewed.        CRANIAL NERVES     CN III, IV, VI   Pupils are equal, round, and reactive to light.  Extraocular motions are normal.        Significant Labs:   Recent Lab Results        01/08/19  1048   01/08/19  0954        Immature Granulocytes   0.3     Immature Grans (Abs)   0.02  Comment:  Mild elevation in immature granulocytes is non specific and   can be seen in a variety of conditions including stress response,   acute inflammation, trauma and pregnancy. Correlation with other   laboratory and clinical findings is essential.       Albumin   4.2     Alkaline Phosphatase   92     ALT   43     Anion Gap   10     Appearance, UA Hazy       AST   42     Bacteria, UA Moderate       Baso #   0.01     Basophil%   0.1     Bilirubin (UA) Negative       Total Bilirubin   1.2  Comment:  For infants and newborns, interpretation of results should be based  on gestational age, weight and in agreement with clinical  observations.  Premature Infant recommended reference ranges:  Up to 24 hours.............<8.0 mg/dL  Up to 48 hours............<12.0 mg/dL  3-5 days..................<15.0 mg/dL  6-29 days.................<15.0 mg/dL       BUN, Bld   10     Calcium   11.5     Chloride   103     CO2   27     Color, UA Yellow       Creatinine   1.1     Differential Method   Automated     eGFR if    >60.0     eGFR if non    >60.0  Comment:  Calculation used to obtain the estimated glomerular filtration  rate (eGFR) is the CKD-EPI equation.        Eos #   0.0     Eosinophil%   0.1     Estimated Avg Glucose   126     Glucose   101     Glucose, UA Negative       Gran # (ANC)   4.7     Gran%   65.2     Hematocrit   46.4     Hemoglobin   15.6     Hemoglobin A1C   6.0  Comment:  ADA Screening Guidelines:  5.7-6.4%  Consistent with prediabetes  >or=6.5%  Consistent with diabetes  High levels of fetal hemoglobin interfere with the HbA1C  assay. Heterozygous hemoglobin variants (HbS, HgC, etc)do  not significantly interfere with this assay.   However, presence of multiple variants may affect accuracy.       Hyaline Casts, UA 0       Coumadin Monitoring INR   1.0  Comment:  Coumadin  Therapy:  2.0 - 3.0 for INR for all indicators except mechanical heart valves  and antiphospholipid syndromes which should use 2.5 - 3.5.       Ketones, UA Trace       Leukocytes, UA 1+       Lipase   567     Lymph #   1.9     Lymph%   26.8     Magnesium   2.3     MCH   29.9     MCHC   33.6     MCV   89     Microscopic Comment SEE COMMENT  Comment:  Other formed elements not mentioned in the report are not   present in the microscopic examination.          Mono #   0.5     Mono%   7.5     MPV   11.9     Nitrite, UA Negative       nRBC   0     Occult Blood UA 2+       pH, UA 5.0       Platelets   194     Potassium   3.2     Total Protein   8.8     Protein, UA 1+  Comment:  Recommend a 24 hour urine protein or a urine   protein/creatinine ratio if globulin induced proteinuria is  clinically suspected.         Protime   10.4     RBC   5.22     RBC, UA 34       RDW   12.9     Sodium   140     Specific Gravity, UA 1.015       Specimen UA Urine, Clean Catch       WBC, UA 13       Stool WBC No neutrophils seen       WBC   7.19         All pertinent labs within the past 24 hours have been reviewed.    Significant Imaging: I have reviewed all pertinent imaging results/findings within the past 24 hours.

## 2019-01-09 NOTE — NURSING
[facility] Med Team O, patient still vomiting after oral phenergan dose, spoke with Dr Phelan, he will come to floor to see patient and will order IV meds. Sanaz Rangel LPN

## 2019-01-09 NOTE — ASSESSMENT & PLAN NOTE
- incidental 0.6cm kidney stone in distal ureter found upon CT scan  - does have history of renal stones in the past and some microscopic hematuria on UA  - 1L LR as above  - continue tamsulosin 0.4mg daily  - can f/u with PCP on discharge, may need evaluation for hyperparathyroidism

## 2019-01-10 PROBLEM — I95.1 SYNCOPE DUE TO ORTHOSTATIC HYPOTENSION: Status: ACTIVE | Noted: 2019-01-10

## 2019-01-10 LAB
ALBUMIN SERPL BCP-MCNC: 3.8 G/DL
ALP SERPL-CCNC: 88 U/L
ALT SERPL W/O P-5'-P-CCNC: 32 U/L
ANION GAP SERPL CALC-SCNC: 9 MMOL/L
ASCENDING AORTA: 3.07 CM
AST SERPL-CCNC: 23 U/L
AV INDEX (PROSTH): 1.01
AV MEAN GRADIENT: 3.88 MMHG
AV PEAK GRADIENT: 8.88 MMHG
AV VALVE AREA: 3.81 CM2
BASOPHILS # BLD AUTO: 0.02 K/UL
BASOPHILS NFR BLD: 0.2 %
BILIRUB SERPL-MCNC: 1 MG/DL
BSA FOR ECHO PROCEDURE: 2.45 M2
BUN SERPL-MCNC: 21 MG/DL
CALCIUM SERPL-MCNC: 10.9 MG/DL
CHLORIDE SERPL-SCNC: 104 MMOL/L
CO2 SERPL-SCNC: 27 MMOL/L
CREAT SERPL-MCNC: 1.1 MG/DL
CV ECHO LV RWT: 0.39 CM
DIFFERENTIAL METHOD: ABNORMAL
DOP CALC AO PEAK VEL: 1.49 M/S
DOP CALC AO VTI: 20.82 CM
DOP CALC LVOT AREA: 3.76 CM2
DOP CALC LVOT DIAMETER: 2.19 CM
DOP CALC LVOT STROKE VOLUME: 79.36 CM3
DOP CALCLVOT PEAK VEL VTI: 21.08 CM
E WAVE DECELERATION TIME: 330.99 MSEC
E/A RATIO: 0.92
ECHO LV POSTERIOR WALL: 0.95 CM (ref 0.6–1.1)
EOSINOPHIL # BLD AUTO: 0 K/UL
EOSINOPHIL NFR BLD: 0 %
ERYTHROCYTE [DISTWIDTH] IN BLOOD BY AUTOMATED COUNT: 13.2 %
EST. GFR  (AFRICAN AMERICAN): >60 ML/MIN/1.73 M^2
EST. GFR  (NON AFRICAN AMERICAN): >60 ML/MIN/1.73 M^2
FRACTIONAL SHORTENING: 24 % (ref 28–44)
GLUCOSE SERPL-MCNC: 107 MG/DL
HCT VFR BLD AUTO: 49.1 %
HGB BLD-MCNC: 16.7 G/DL
IMM GRANULOCYTES # BLD AUTO: 0.04 K/UL
IMM GRANULOCYTES NFR BLD AUTO: 0.3 %
INTERVENTRICULAR SEPTUM: 0.95 CM (ref 0.6–1.1)
IVRT: 0.06 MSEC
LA MAJOR: 4.67 CM
LA MINOR: 4.69 CM
LA WIDTH: 3.95 CM
LEFT ATRIUM SIZE: 3.82 CM
LEFT ATRIUM VOLUME INDEX: 25.5 ML/M2
LEFT ATRIUM VOLUME: 60.02 CM3
LEFT INTERNAL DIMENSION IN SYSTOLE: 3.65 CM (ref 2.1–4)
LEFT VENTRICLE DIASTOLIC VOLUME INDEX: 46.27 ML/M2
LEFT VENTRICLE DIASTOLIC VOLUME: 108.76 ML
LEFT VENTRICLE MASS INDEX: 68 G/M2
LEFT VENTRICLE SYSTOLIC VOLUME INDEX: 24 ML/M2
LEFT VENTRICLE SYSTOLIC VOLUME: 56.38 ML
LEFT VENTRICULAR INTERNAL DIMENSION IN DIASTOLE: 4.82 CM (ref 3.5–6)
LEFT VENTRICULAR MASS: 159.91 G
LV LATERAL E/E' RATIO: 4.15
LYMPHOCYTES # BLD AUTO: 1.4 K/UL
LYMPHOCYTES NFR BLD: 11.7 %
MAGNESIUM SERPL-MCNC: 1.8 MG/DL
MCH RBC QN AUTO: 30.5 PG
MCHC RBC AUTO-ENTMCNC: 34 G/DL
MCV RBC AUTO: 90 FL
MONOCYTES # BLD AUTO: 0.9 K/UL
MONOCYTES NFR BLD: 7.6 %
MV PEAK A VEL: 0.59 M/S
MV PEAK E VEL: 0.54 M/S
NEUTROPHILS # BLD AUTO: 9.6 K/UL
NEUTROPHILS NFR BLD: 80.2 %
NRBC BLD-RTO: 0 /100 WBC
PISA TR MAX VEL: 1.9 M/S
PLATELET # BLD AUTO: 189 K/UL
PMV BLD AUTO: 11.6 FL
POCT GLUCOSE: 108 MG/DL (ref 70–110)
POTASSIUM SERPL-SCNC: 3.4 MMOL/L
PROT SERPL-MCNC: 8.1 G/DL
PULM VEIN S/D RATIO: 1.35
PV PEAK D VEL: 0.49 M/S
PV PEAK S VEL: 0.66 M/S
RA MAJOR: 5.24 CM
RA PRESSURE: 3 MMHG
RA WIDTH: 3.38 CM
RBC # BLD AUTO: 5.47 M/UL
RIGHT VENTRICULAR END-DIASTOLIC DIMENSION: 3.46 CM
RV TISSUE DOPPLER FREE WALL SYSTOLIC VELOCITY 1 (APICAL 4 CHAMBER VIEW): 14.78 M/S
SINUS: 3.2 CM
SODIUM SERPL-SCNC: 140 MMOL/L
STJ: 2.93 CM
TDI LATERAL: 0.13
TR MAX PG: 14.44 MMHG
TRICUSPID ANNULAR PLANE SYSTOLIC EXCURSION: 1.62 CM
TV REST PULMONARY ARTERY PRESSURE: 17 MMHG
WBC # BLD AUTO: 11.92 K/UL

## 2019-01-10 PROCEDURE — 99233 PR SUBSEQUENT HOSPITAL CARE,LEVL III: ICD-10-PCS | Mod: ,,, | Performed by: HOSPITALIST

## 2019-01-10 PROCEDURE — 93005 ELECTROCARDIOGRAM TRACING: CPT

## 2019-01-10 PROCEDURE — 63600175 PHARM REV CODE 636 W HCPCS: Performed by: HOSPITALIST

## 2019-01-10 PROCEDURE — 83735 ASSAY OF MAGNESIUM: CPT

## 2019-01-10 PROCEDURE — 11000001 HC ACUTE MED/SURG PRIVATE ROOM

## 2019-01-10 PROCEDURE — 93010 ELECTROCARDIOGRAM REPORT: CPT | Mod: ,,, | Performed by: INTERNAL MEDICINE

## 2019-01-10 PROCEDURE — 85025 COMPLETE CBC W/AUTO DIFF WBC: CPT

## 2019-01-10 PROCEDURE — 99233 SBSQ HOSP IP/OBS HIGH 50: CPT | Mod: ,,, | Performed by: HOSPITALIST

## 2019-01-10 PROCEDURE — 93010 EKG 12-LEAD: ICD-10-PCS | Mod: ,,, | Performed by: INTERNAL MEDICINE

## 2019-01-10 PROCEDURE — 25000003 PHARM REV CODE 250: Performed by: PHYSICIAN ASSISTANT

## 2019-01-10 PROCEDURE — 80053 COMPREHEN METABOLIC PANEL: CPT

## 2019-01-10 PROCEDURE — 25000003 PHARM REV CODE 250: Performed by: HOSPITALIST

## 2019-01-10 RX ORDER — SODIUM CHLORIDE 9 MG/ML
INJECTION, SOLUTION INTRAVENOUS CONTINUOUS
Status: DISCONTINUED | OUTPATIENT
Start: 2019-01-10 | End: 2019-01-12 | Stop reason: HOSPADM

## 2019-01-10 RX ORDER — POTASSIUM CHLORIDE 20 MEQ/15ML
40 SOLUTION ORAL ONCE
Status: COMPLETED | OUTPATIENT
Start: 2019-01-10 | End: 2019-01-10

## 2019-01-10 RX ORDER — ERGOCALCIFEROL 1.25 MG/1
50000 CAPSULE ORAL
Status: DISCONTINUED | OUTPATIENT
Start: 2019-01-11 | End: 2019-01-12 | Stop reason: HOSPADM

## 2019-01-10 RX ADMIN — PROMETHAZINE HYDROCHLORIDE 12.5 MG: 25 INJECTION INTRAMUSCULAR; INTRAVENOUS at 09:01

## 2019-01-10 RX ADMIN — ATORVASTATIN CALCIUM 40 MG: 20 TABLET, FILM COATED ORAL at 10:01

## 2019-01-10 RX ADMIN — LOPERAMIDE HYDROCHLORIDE 2 MG: 2 CAPSULE ORAL at 10:01

## 2019-01-10 RX ADMIN — AMLODIPINE BESYLATE 10 MG: 10 TABLET ORAL at 10:01

## 2019-01-10 RX ADMIN — PROMETHAZINE HYDROCHLORIDE 12.5 MG: 25 INJECTION INTRAMUSCULAR; INTRAVENOUS at 05:01

## 2019-01-10 RX ADMIN — ENOXAPARIN SODIUM 40 MG: 100 INJECTION SUBCUTANEOUS at 04:01

## 2019-01-10 RX ADMIN — SODIUM CHLORIDE: 0.9 INJECTION, SOLUTION INTRAVENOUS at 07:01

## 2019-01-10 RX ADMIN — POTASSIUM CHLORIDE 40 MEQ: 20 SOLUTION ORAL at 04:01

## 2019-01-10 RX ADMIN — PREDNISONE 10 MG: 10 TABLET ORAL at 10:01

## 2019-01-10 RX ADMIN — SODIUM CHLORIDE 1000 ML: 0.9 INJECTION, SOLUTION INTRAVENOUS at 03:01

## 2019-01-10 RX ADMIN — TAMSULOSIN HYDROCHLORIDE 0.4 MG: 0.4 CAPSULE ORAL at 10:01

## 2019-01-10 NOTE — NURSING
"Patient ambulated to bathroom on own and pull the emergency cord in the bathroom. Upon PCT arrival, PCT notified surrounding nurses and myself that patient is "passing out" and is non responsive. Upon my arrival, patient was wide eyed and was unable to respond or hold his own weight. Code blue was called by assisting nurse. After a couple of seconds, patient became minimally responsive, breathing normally, but still appeared very weak. With assistance from four nurses, patient was carried back in bed and a non rebreather was applied. Patient said "thank you" and was aware of what happened. VS were stable and BS was WNL. Code was called off. Physician's examined patient and placed orders. A conclusion of severe dehydration and weakness took place due to the fact that the patient has not been able to eat or drink for the past 2 days. 1 L bolus given. Patient resting comfortably. WCTM.   "

## 2019-01-10 NOTE — PLAN OF CARE
Problem: Adult Inpatient Plan of Care  Goal: Plan of Care Review  Patient AAOX4, siderails upx 2, call light and belongings in reach.  Patient has had consistent nausea and vomiting this shift, relieved with IV phenergan.  Patient denied pain throughout shift.  IV site clean, dry and intact, saline locked.  Patient ambulates to bathroom independently.  Patient remains free of falls and safety has been maintained this shift. Sanaz Rangel LPN

## 2019-01-10 NOTE — SIGNIFICANT EVENT
Rapid Response Nurse Note     Rapid Response Metrics:     Admit Date: 2019  LOS: 2  Code Status: Full Code   Date of Consult: 01/10/2019  : 1960  Age: 58 y.o.  Weight:   Wt Readings from Last 1 Encounters:   19 122.9 kg (271 lb)     Sex: male  Race: Black or    Bed: 530/[facility] A:   MRN: [id number]  Time Rapid Response Team page Received: 025 (Code Blue)  Time Rapid Response Team at Bedside: 025 (Code Blue Canceled)  Time Rapid Response Team left Bedside: 315  Was the patient discharged from an ICU this admission?   no  Was the patient discharged from a PACU within last 24 hours?  no  Did the patient receive conscious sedation/general anesthesia within last 24 hours?  no  Was the patient in the ED within the past 24 hours?  yes  Was the patient started on NIPPV within the past 24 hours?  no  Did this progress into an ARC or CPA:  no  Attending Physician: Mohit Phelan, *  Primary Service: [facility] HOSP MED O  Consult Requested By: Mohit Phelan, *   Rapid Response Indication(s): Code Blue called/patient vagaled on commode.      SITUATION:     Reason for Call:   Called to evaluate the patient for Circulatory    BACKGROUND:     Why is the patient in the hospital?:Acute diarrhea  What changed?: patient was on commode and went limp and diaphoretic      ASSESSMENT:     What did you find: Patient was already placed back in bed. Patient was talking and following commands.Blood sugar checked- 108. Multiple episodes of diarrhea and N/V during the day and night noted    RECOMMENDATIONS:     We recommend: Bedrest for the rest of the night and IV fluid bolus    FOLLOW-UP/CONTINGENCY PLAN:     Patient needs a second visit at : 0800  Call the rapid response Nurse at x [phone] for additional questions or concerns.      PHYSICIAN ESCALATION:     Orders received and case discussed with Espinoza Morillo PA-C    Disposition: Remain in room 530.

## 2019-01-10 NOTE — SIGNIFICANT EVENT
Significant Event  [facility]    CC:  Acute diarrhea  Date:  01/10/2019  Admit Date:  1/8/2019  Hospital Length of Stay:  2    Code Status:  Full Code     SUBJECTIVE:     Significant Events:  Called urgently to the bedside by nurse to evaluate patient for syncopal event.  Patient reports feeling weak throughout the day and continuing to have NVD.  He reports that while he was on the toilet, he passed loose stool, felt progressively weaker, dizzy and so called the nurse.  Per nursing, nurses carried patient to the bad and patient was passed out for 5 minutes.  During that time a code blue was called, but was cancelled when patient awoke.     OBJECTIVE:     Physical Exam:  Last Vitals:   Vitals:    01/09/19 2345   BP: (!) 142/93   Pulse: 85   Resp: 18   Temp: 98 °F (36.7 °C)     GA:  Tired-appearing male in NAD  HEENT:  PERRL.  No scleral icterus or JVD.   Pulmonary:  Clear to auscultation A/P/L.  No wheezing, crackles, or rhonchi.  Cardiac:  RRR, S1 & S2 w/o rubs/murmurs/gallops.   Abdominal:  Bowel sounds present x 4. No appreciable hepatosplenomegaly.  Neuro:  --Mental Status:  AAOx4  --CN II-XII grossly intact.  Corneal reflex, gag, cough intact.  - no pronator drift, finger nose coord intact  --Extremity strength and sensation intact x 4.     ASSESSMENT AND PLAN/INTERVENTIONS:     1) Vasovagal syncope  Plan/Interventions:  - EKG, CBC  - 2D echo for AM  - No focal deficits and likley dehydrated from GI losses so no neuroimaging indicated at this time    Uninterrupted Critical Care/Counseling Time (not including procedures):  25    Espinoza Morillo PA-C  [facility] Department  Staff:   Kyler Balderas DO

## 2019-01-10 NOTE — PROGRESS NOTES
[facility] Hospital Medicine  Progress Note    Patient Name: Massimo Funez  MRN: [id number]  Patient Class: IP- Inpatient   Admission Date: 1/8/2019  Length of Stay: 2 days  Attending Physician: Mohit Phelan, *  Primary Care Provider: Corey Kwok MD    [facility] Medicine Team: [facility] HOSP MED O Mohit Phelan MD    Subjective:     Principal Problem:Acute diarrhea    HPI:  Mr. Funez is a 57yo man with ALBARADO, RA on prednisone and etanercept, and history of nephrolithiasis who presents to the ED with 1 day of nausea and loose diarrhea. He states that yesterday evening he started experiencing some mild nausea without emesis and began having watery diarrhea. Reports multiple episodes of loose, watery yellow stool. Non-bloody, no melena. No associated abdominal pain; although he does report some vague discomfort. Diarrhea has continued this am. Otherwise, he is afebrile and with no other acute complaints. States he has been eating healthier lately after being diagnosed with ALBARADO and has stopped drinking and smoking. Did note episode of reflux 3d ago, but otherwise has been in normal state of health.    He recently started etanercept for his RA and has taken one dose so far on 12/20; additional doses were held due to concern that he had a cold and his wife had the flu so he did not want to risk an infection.    Hospital Course:  No notes on file    Interval History: Still with nausea and vomiting; diarrhea continues. Had episode of syncope last night while on the toilet, likely 2/2 hypovolemia/vagal stimulation. Reports some abdominal discomfort and has not been able to eat much more than a few grapes and water.    Review of Systems   Constitutional: Negative for chills, diaphoresis and fever.   HENT: Negative for congestion and sore throat.    Eyes: Negative for discharge and visual disturbance.   Respiratory: Negative for cough and shortness of breath.    Cardiovascular:  Negative for chest pain and leg swelling.   Gastrointestinal: Positive for diarrhea, nausea and vomiting. Negative for abdominal pain.   Genitourinary: Negative for dysuria and flank pain.   Musculoskeletal: Negative for arthralgias and joint swelling.   Skin: Negative for rash and wound.   Allergic/Immunologic: Positive for immunocompromised state.   Neurological: Negative for light-headedness and headaches.   Psychiatric/Behavioral: Negative for agitation and confusion.     Objective:     Vital Signs (Most Recent):  Temp: 98.4 °F (36.9 °C) (01/10/19 1113)  Pulse: 70 (01/10/19 1113)  Resp: 17 (01/10/19 1113)  BP: (!) 163/77 (01/10/19 1113)  SpO2: 97 % (01/10/19 1113) Vital Signs (24h Range):  Temp:  [97 °F (36.1 °C)-98.4 °F (36.9 °C)] 98.4 °F (36.9 °C)  Pulse:  [69-95] 70  Resp:  [17-18] 17  SpO2:  [95 %-97 %] 97 %  BP: (129-163)/(74-93) 163/77     Weight: 122.9 kg (271 lb)  Body mass index is 40.02 kg/m².    Intake/Output Summary (Last 24 hours) at 1/10/2019 1221  Last data filed at 1/10/2019 0555  Gross per 24 hour   Intake 1480 ml   Output 500 ml   Net 980 ml      Physical Exam   Constitutional: He is oriented to person, place, and time. He appears well-developed and well-nourished. No distress.   obese   HENT:   Head: Normocephalic and atraumatic.   Nose: Nose normal.   Eyes: EOM are normal. Pupils are equal, round, and reactive to light. No scleral icterus.   Neck: Normal range of motion. No JVD present. No tracheal deviation present.   Cardiovascular: Normal rate, regular rhythm and normal heart sounds. Exam reveals no gallop and no friction rub.   No murmur heard.  Pulmonary/Chest: Effort normal and breath sounds normal. No respiratory distress.   Abdominal: Soft. He exhibits no distension and no mass. There is no tenderness. No hernia.   Musculoskeletal: He exhibits no edema or deformity.   Neurological: He is alert and oriented to person, place, and time.   Skin: Skin is warm and dry. No rash noted. He is  not diaphoretic.   Psychiatric: He has a normal mood and affect. His behavior is normal.   Vitals reviewed.      Significant Labs:   Recent Lab Results       01/10/19  0857   01/10/19  0251   01/09/19  1825        Ascending aorta 3.07         Ao peak nick 1.49         Ao VTI 20.82         AV valve area 3.81         AV mean gradient 3.88         AV peak gradient 8.88         BSA 2.45         Calcium Creatinine Ratio     0.01     Calcium, Ur     5.0  Comment:  The random urine reference ranges provided were established   for 24 hour urine collections.  No reference ranges exist for  random urine specimens.  Correlate clinically.       Creatinine, Random Ur     433.0  Comment:  The random urine reference ranges provided were established   for 24 hour urine collections.  No reference ranges exist for  random urine specimens.  Correlate clinically.       Left Ventricle Relative Wall Thickness 0.39         AV index (prosthetic) 1.01         E/A ratio 0.92         E wave decelartion time 330.99         FS 24         IVRT 0.06         IVS 0.95         LA WIDTH 3.95         Left Atrium Major Axis 4.67         Left Atrium Minor Axis 4.69         LA size 3.82         LA volume 60.02         LA Volume Index 25.5         LVOT area 3.76         LV LATERAL E/E' RATIO 4.15         LV Diastolic Volume 108.76         LV Diastolic Volume Index 46.27         LVIDD 4.82         LVIDS 3.65         LV mass 159.91         LV Mass Index 68.0         LVOT diameter 2.19         LVOT stroke volume 79.36         LVOT peak VTI 21.08         LV Systolic Volume 56.38         LV Systolic Volume Index 24.0         MV Peak A Nick 0.59         MV Peak E Nick 0.54         POCT Glucose   108       PV Peak D Nick 0.49         PV Peak S Nick 0.66         Pulm vein S/D ratio 1.35         PW 0.95         Right Atrial Pressure (from IVC) 3         RA Major Axis 5.24         RA Width 3.38         RV S' 14.78         RVDD 3.46         Sinus 3.20         STJ 2.93          TAPSE 1.62         TDI LATERAL 0.13         Triscuspid Valve Regurgitation Peak Gradient 14.44         TR Max Nick 1.90         TV rest pulmonary artery pressure 17             All pertinent labs within the past 24 hours have been reviewed.    Significant Imaging: I have reviewed all pertinent imaging results/findings within the past 24 hours.    Assessment/Plan:      * Acute diarrhea    - acute onset over 1 day, watery  - stool studies, including C. Diff, negative  - likely 2/2 acute viral gastroenteritis  - initiated loperamide and IV antiemetics: zofran and phenergan to alternate  - CT a/p without any evidence of colitis       Elevated amylase and lipase    - unlikely to be pancreatitis; does have elevated lipase/amylase but no abdominal pain or evidence of inflammation on CT  - recent abdominal US w/o any gallstones; also not visualized on CT, will defer repeat imaging for now  - does have elevated Ca (see below), which can cause pancreatitis, but would be very unusual presentation  - could be 2/2 diarrhea/GI source; less likely malignancy given lack of any other symptoms (albiet with hyperCa)  - can pursue further workup if necessary, but would wait for something to declare itself as this could be 2/2 acute gastroenteritis alone  - on maintenance fluids     Hypercalcemia    - unclear origin; does historically have elevated Ca in the past  - ddx includes iatrogenic (patient reports taking Ca supplements) vs medication effect vs malignancy vs hyperparathyroid  - holding HCTZ, home Ca supplements  - PTH elevated, vitamin D low; c/w PHP  - will initiate vitamin D supplements  - given history of renal stones, may benefit from further evaluation and possibly surgery  - will need to f/u with PCP       Rheumatoid arthritis involving multiple sites with positive rheumatoid factor    - continue home prednisone  - continue holding etanercept for now pending infectious w/u and outpatient Rheum f/u       ALBAARDO  (nonalcoholic steatohepatitis)    - mildly elevated AST/ALT and t bili  - largely c/w baseline, improved today  - continue to monitor  - consider repeat abdominal US if not improving, but was done <1 month ago and normal at that time       Nephrolithiasis    - incidental 0.6cm kidney stone in distal ureter found upon CT scan  - does have history of renal stones in the past and some microscopic hematuria on UA  - fluids as above  - continue tamsulosin 0.4mg daily  - can f/u with PCP on discharge, may need evaluation for hyperparathyroidism       Syncope due to orthostatic hypotension    - syncopized on 1/9, likely due to dehydration and vagal stimulation in setting of toilet use  - cardiac workup negative, including unremarkable EKG and TTE  - s/p bolus NS overnight; now on maintenance fluids, will encourage diet as tolerated       Viral gastroenteritis    - see diarrhea       Intractable vomiting with nausea    - developed nausea and vomiting, suspect viral gastroenteritis as cause  - IV zofran and phenergan, can alternate dosages for further coverage     Hyperlipidemia    - continue atorvastatin 40       Osteoarthritis    - continue RA treatment       Hypertension    - currently stable  - holding HCTZ  - holding amlodipine 10 and lasix 20 every other day in setting of syncope         VTE Risk Mitigation (From admission, onward)        Ordered     enoxaparin injection 40 mg  Daily      01/08/19 1534     IP VTE HIGH RISK PATIENT  Once      01/08/19 1534              Mohit Phelan MD  Department of Hospital Medicine   [facility]

## 2019-01-10 NOTE — ASSESSMENT & PLAN NOTE
- syncopized on 1/9, likely due to dehydration and vagal stimulation in setting of toilet use  - cardiac workup negative, including unremarkable EKG and TTE  - s/p bolus NS overnight; now on maintenance fluids, will encourage diet as tolerated

## 2019-01-10 NOTE — ASSESSMENT & PLAN NOTE
- unlikely to be pancreatitis; does have elevated lipase/amylase but no abdominal pain or evidence of inflammation on CT  - recent abdominal US w/o any gallstones; also not visualized on CT, will defer repeat imaging for now  - does have elevated Ca (see below), which can cause pancreatitis, but would be very unusual presentation  - could be 2/2 diarrhea/GI source; less likely malignancy given lack of any other symptoms (albiet with hyperCa)  - can pursue further workup if necessary, but would wait for something to declare itself as this could be 2/2 acute gastroenteritis alone  - on maintenance fluids

## 2019-01-10 NOTE — ASSESSMENT & PLAN NOTE
- incidental 0.6cm kidney stone in distal ureter found upon CT scan  - does have history of renal stones in the past and some microscopic hematuria on UA  - fluids as above  - continue tamsulosin 0.4mg daily  - can f/u with PCP on discharge, may need evaluation for hyperparathyroidism

## 2019-01-10 NOTE — PLAN OF CARE
Problem: Adult Inpatient Plan of Care  Goal: Plan of Care Review  Patient AAOX4, call light and belongings in reach, siderails up x2.  Patient continues with intermittent nausea and vomiting, currently IV phenergan gives moderate relief.  IV site infusing and clean, dry and intact.  Patient remains free of falls and safety has been maintained. Sanaz Rangel LPN

## 2019-01-10 NOTE — ASSESSMENT & PLAN NOTE
- acute onset over 1 day, watery  - stool studies, including C. Diff, negative  - likely 2/2 acute viral gastroenteritis  - initiated loperamide and IV antiemetics: zofran and phenergan to alternate  - CT a/p without any evidence of colitis

## 2019-01-10 NOTE — NURSING
Paged Med Team O patient regarding patient complaint of abdominal discomfort.  Spoke to Dr Phelan, he will come to visit patient, no orders given. Sanaz Rangel LPN

## 2019-01-10 NOTE — ASSESSMENT & PLAN NOTE
- currently stable  - holding HCTZ  - holding amlodipine 10 and lasix 20 every other day in setting of syncope

## 2019-01-10 NOTE — ASSESSMENT & PLAN NOTE
- developed nausea and vomiting, suspect viral gastroenteritis as cause  - IV zofran and phenergan, can alternate dosages for further coverage

## 2019-01-10 NOTE — SUBJECTIVE & OBJECTIVE
Interval History: Still with nausea and vomiting; diarrhea continues. Had episode of syncope last night while on the toilet, likely 2/2 hypovolemia/vagal stimulation. Reports some abdominal discomfort and has not been able to eat much more than a few grapes and water.    Review of Systems   Constitutional: Negative for chills, diaphoresis and fever.   HENT: Negative for congestion and sore throat.    Eyes: Negative for discharge and visual disturbance.   Respiratory: Negative for cough and shortness of breath.    Cardiovascular: Negative for chest pain and leg swelling.   Gastrointestinal: Positive for diarrhea, nausea and vomiting. Negative for abdominal pain.   Genitourinary: Negative for dysuria and flank pain.   Musculoskeletal: Negative for arthralgias and joint swelling.   Skin: Negative for rash and wound.   Allergic/Immunologic: Positive for immunocompromised state.   Neurological: Negative for light-headedness and headaches.   Psychiatric/Behavioral: Negative for agitation and confusion.     Objective:     Vital Signs (Most Recent):  Temp: 98.4 °F (36.9 °C) (01/10/19 1113)  Pulse: 70 (01/10/19 1113)  Resp: 17 (01/10/19 1113)  BP: (!) 163/77 (01/10/19 1113)  SpO2: 97 % (01/10/19 1113) Vital Signs (24h Range):  Temp:  [97 °F (36.1 °C)-98.4 °F (36.9 °C)] 98.4 °F (36.9 °C)  Pulse:  [69-95] 70  Resp:  [17-18] 17  SpO2:  [95 %-97 %] 97 %  BP: (129-163)/(74-93) 163/77     Weight: 122.9 kg (271 lb)  Body mass index is 40.02 kg/m².    Intake/Output Summary (Last 24 hours) at 1/10/2019 1221  Last data filed at 1/10/2019 0555  Gross per 24 hour   Intake 1480 ml   Output 500 ml   Net 980 ml      Physical Exam   Constitutional: He is oriented to person, place, and time. He appears well-developed and well-nourished. No distress.   obese   HENT:   Head: Normocephalic and atraumatic.   Nose: Nose normal.   Eyes: EOM are normal. Pupils are equal, round, and reactive to light. No scleral icterus.   Neck: Normal range of  motion. No JVD present. No tracheal deviation present.   Cardiovascular: Normal rate, regular rhythm and normal heart sounds. Exam reveals no gallop and no friction rub.   No murmur heard.  Pulmonary/Chest: Effort normal and breath sounds normal. No respiratory distress.   Abdominal: Soft. He exhibits no distension and no mass. There is no tenderness. No hernia.   Musculoskeletal: He exhibits no edema or deformity.   Neurological: He is alert and oriented to person, place, and time.   Skin: Skin is warm and dry. No rash noted. He is not diaphoretic.   Psychiatric: He has a normal mood and affect. His behavior is normal.   Vitals reviewed.      Significant Labs:   Recent Lab Results       01/10/19  0857   01/10/19  0251   01/09/19  1825        Ascending aorta 3.07         Ao peak dolly 1.49         Ao VTI 20.82         AV valve area 3.81         AV mean gradient 3.88         AV peak gradient 8.88         BSA 2.45         Calcium Creatinine Ratio     0.01     Calcium, Ur     5.0  Comment:  The random urine reference ranges provided were established   for 24 hour urine collections.  No reference ranges exist for  random urine specimens.  Correlate clinically.       Creatinine, Random Ur     433.0  Comment:  The random urine reference ranges provided were established   for 24 hour urine collections.  No reference ranges exist for  random urine specimens.  Correlate clinically.       Left Ventricle Relative Wall Thickness 0.39         AV index (prosthetic) 1.01         E/A ratio 0.92         E wave decelartion time 330.99         FS 24         IVRT 0.06         IVS 0.95         LA WIDTH 3.95         Left Atrium Major Axis 4.67         Left Atrium Minor Axis 4.69         LA size 3.82         LA volume 60.02         LA Volume Index 25.5         LVOT area 3.76         LV LATERAL E/E' RATIO 4.15         LV Diastolic Volume 108.76         LV Diastolic Volume Index 46.27         LVIDD 4.82         LVIDS 3.65         LV mass  159.91         LV Mass Index 68.0         LVOT diameter 2.19         LVOT stroke volume 79.36         LVOT peak VTI 21.08         LV Systolic Volume 56.38         LV Systolic Volume Index 24.0         MV Peak A Nick 0.59         MV Peak E Nick 0.54         POCT Glucose   108       PV Peak D Nick 0.49         PV Peak S Nick 0.66         Pulm vein S/D ratio 1.35         PW 0.95         Right Atrial Pressure (from IVC) 3         RA Major Axis 5.24         RA Width 3.38         RV S' 14.78         RVDD 3.46         Sinus 3.20         STJ 2.93         TAPSE 1.62         TDI LATERAL 0.13         Triscuspid Valve Regurgitation Peak Gradient 14.44         TR Max Nick 1.90         TV rest pulmonary artery pressure 17             All pertinent labs within the past 24 hours have been reviewed.    Significant Imaging: I have reviewed all pertinent imaging results/findings within the past 24 hours.

## 2019-01-10 NOTE — NURSING
After 1000 cc NS bolus, patient is looking and feeling a lot better. Patient was able to tolerate 1000 ccs of PO intake of ice water.

## 2019-01-11 LAB
ALBUMIN SERPL BCP-MCNC: 3.1 G/DL
ALP SERPL-CCNC: 79 U/L
ALT SERPL W/O P-5'-P-CCNC: 26 U/L
ANION GAP SERPL CALC-SCNC: 9 MMOL/L
AST SERPL-CCNC: 17 U/L
BACTERIA STL CULT: NORMAL
BASOPHILS # BLD AUTO: 0.02 K/UL
BASOPHILS NFR BLD: 0.2 %
BILIRUB SERPL-MCNC: 1 MG/DL
BUN SERPL-MCNC: 18 MG/DL
CALCIUM SERPL-MCNC: 10.2 MG/DL
CHLORIDE SERPL-SCNC: 109 MMOL/L
CO2 SERPL-SCNC: 23 MMOL/L
CREAT SERPL-MCNC: 1 MG/DL
DIFFERENTIAL METHOD: ABNORMAL
EOSINOPHIL # BLD AUTO: 0 K/UL
EOSINOPHIL NFR BLD: 0 %
ERYTHROCYTE [DISTWIDTH] IN BLOOD BY AUTOMATED COUNT: 13.2 %
EST. GFR  (AFRICAN AMERICAN): >60 ML/MIN/1.73 M^2
EST. GFR  (NON AFRICAN AMERICAN): >60 ML/MIN/1.73 M^2
GLUCOSE SERPL-MCNC: 105 MG/DL
HCT VFR BLD AUTO: 46 %
HGB BLD-MCNC: 12.7 G/DL
HGB BLD-MCNC: 15.1 G/DL
IMM GRANULOCYTES # BLD AUTO: 0.04 K/UL
IMM GRANULOCYTES NFR BLD AUTO: 0.3 %
LYMPHOCYTES # BLD AUTO: 1.6 K/UL
LYMPHOCYTES NFR BLD: 12.2 %
MAGNESIUM SERPL-MCNC: 1.9 MG/DL
MCH RBC QN AUTO: 30.6 PG
MCHC RBC AUTO-ENTMCNC: 32.8 G/DL
MCV RBC AUTO: 93 FL
MONOCYTES # BLD AUTO: 0.8 K/UL
MONOCYTES NFR BLD: 6.4 %
NEUTROPHILS # BLD AUTO: 10.7 K/UL
NEUTROPHILS NFR BLD: 80.9 %
NRBC BLD-RTO: 0 /100 WBC
PLATELET # BLD AUTO: 187 K/UL
PMV BLD AUTO: 11.6 FL
POTASSIUM SERPL-SCNC: 3.9 MMOL/L
PROT SERPL-MCNC: 7 G/DL
RBC # BLD AUTO: 4.93 M/UL
SODIUM SERPL-SCNC: 141 MMOL/L
WBC # BLD AUTO: 13.18 K/UL

## 2019-01-11 PROCEDURE — 85018 HEMOGLOBIN: CPT

## 2019-01-11 PROCEDURE — 36415 COLL VENOUS BLD VENIPUNCTURE: CPT

## 2019-01-11 PROCEDURE — 25000003 PHARM REV CODE 250: Performed by: HOSPITALIST

## 2019-01-11 PROCEDURE — 11000001 HC ACUTE MED/SURG PRIVATE ROOM

## 2019-01-11 PROCEDURE — 85025 COMPLETE CBC W/AUTO DIFF WBC: CPT

## 2019-01-11 PROCEDURE — 83735 ASSAY OF MAGNESIUM: CPT

## 2019-01-11 PROCEDURE — 63600175 PHARM REV CODE 636 W HCPCS: Performed by: HOSPITALIST

## 2019-01-11 PROCEDURE — 99233 SBSQ HOSP IP/OBS HIGH 50: CPT | Mod: ,,, | Performed by: HOSPITALIST

## 2019-01-11 PROCEDURE — 80053 COMPREHEN METABOLIC PANEL: CPT

## 2019-01-11 PROCEDURE — 99233 PR SUBSEQUENT HOSPITAL CARE,LEVL III: ICD-10-PCS | Mod: ,,, | Performed by: HOSPITALIST

## 2019-01-11 RX ADMIN — ENOXAPARIN SODIUM 40 MG: 100 INJECTION SUBCUTANEOUS at 04:01

## 2019-01-11 RX ADMIN — SODIUM CHLORIDE: 0.9 INJECTION, SOLUTION INTRAVENOUS at 04:01

## 2019-01-11 RX ADMIN — TAMSULOSIN HYDROCHLORIDE 0.4 MG: 0.4 CAPSULE ORAL at 08:01

## 2019-01-11 RX ADMIN — SODIUM CHLORIDE: 0.9 INJECTION, SOLUTION INTRAVENOUS at 11:01

## 2019-01-11 RX ADMIN — ATORVASTATIN CALCIUM 40 MG: 20 TABLET, FILM COATED ORAL at 08:01

## 2019-01-11 RX ADMIN — SODIUM CHLORIDE: 0.9 INJECTION, SOLUTION INTRAVENOUS at 09:01

## 2019-01-11 RX ADMIN — PREDNISONE 10 MG: 10 TABLET ORAL at 08:01

## 2019-01-11 RX ADMIN — PANTOPRAZOLE SODIUM 40 MG: 40 TABLET, DELAYED RELEASE ORAL at 08:01

## 2019-01-11 RX ADMIN — LOPERAMIDE HYDROCHLORIDE 2 MG: 2 CAPSULE ORAL at 11:01

## 2019-01-11 NOTE — PROGRESS NOTES
[facility] Hospital Medicine  Progress Note    Patient Name: Massimo Funez  MRN: [id number]  Patient Class: IP- Inpatient   Admission Date: 1/8/2019  Length of Stay: 3 days  Attending Physician: Mohit Phelan, *  Primary Care Provider: Corey Kwok MD    [facility] Medicine Team: [facility] Mohit Phelan MD    Subjective:     Principal Problem:Acute diarrhea    HPI:  Mr. Funez is a 57yo man with ALBARADO, RA on prednisone and etanercept, and history of nephrolithiasis who presents to the ED with 1 day of nausea and loose diarrhea. He states that yesterday evening he started experiencing some mild nausea without emesis and began having watery diarrhea. Reports multiple episodes of loose, watery yellow stool. Non-bloody, no melena. No associated abdominal pain; although he does report some vague discomfort. Diarrhea has continued this am. Otherwise, he is afebrile and with no other acute complaints. States he has been eating healthier lately after being diagnosed with ALBARADO and has stopped drinking and smoking. Did note episode of reflux 3d ago, but otherwise has been in normal state of health.    He recently started etanercept for his RA and has taken one dose so far on 12/20; additional doses were held due to concern that he had a cold and his wife had the flu so he did not want to risk an infection.    Hospital Course:  No notes on file    Interval History: Greatly improved nausea and vomiting; able to tolerate breakfast this am. He is still having diarrhea. Abdominal discomfort improving.    Review of Systems   Constitutional: Negative for chills, diaphoresis and fever.   HENT: Negative for congestion and sore throat.    Eyes: Negative for discharge and visual disturbance.   Respiratory: Negative for cough and shortness of breath.    Cardiovascular: Negative for chest pain and leg swelling.   Gastrointestinal: Positive for diarrhea, nausea and vomiting. Negative  for abdominal pain.   Genitourinary: Negative for dysuria and flank pain.   Musculoskeletal: Negative for arthralgias and joint swelling.   Skin: Negative for rash and wound.   Allergic/Immunologic: Positive for immunocompromised state.   Neurological: Negative for light-headedness and headaches.   Psychiatric/Behavioral: Negative for agitation and confusion.     Objective:     Vital Signs (Most Recent):  Temp: 97.4 °F (36.3 °C) (01/11/19 0455)  Pulse: 77 (01/11/19 0840)  Resp: 18 (01/11/19 0840)  BP: (!) 148/77 (01/11/19 0840)  SpO2: 99 % (01/11/19 0840) Vital Signs (24h Range):  Temp:  [97.4 °F (36.3 °C)-98.4 °F (36.9 °C)] 97.4 °F (36.3 °C)  Pulse:  [70-86] 77  Resp:  [17-18] 18  SpO2:  [95 %-99 %] 99 %  BP: (128-163)/(77-82) 148/77     Weight: 122.9 kg (271 lb)  Body mass index is 40.02 kg/m².    Intake/Output Summary (Last 24 hours) at 1/11/2019 0954  Last data filed at 1/11/2019 0800  Gross per 24 hour   Intake 2890.42 ml   Output 300 ml   Net 2590.42 ml      Physical Exam   Constitutional: He is oriented to person, place, and time. He appears well-developed and well-nourished. No distress.   obese   HENT:   Head: Normocephalic and atraumatic.   Nose: Nose normal.   Eyes: EOM are normal. Pupils are equal, round, and reactive to light. No scleral icterus.   Neck: Normal range of motion. No JVD present. No tracheal deviation present.   Cardiovascular: Normal rate, regular rhythm and normal heart sounds. Exam reveals no gallop and no friction rub.   No murmur heard.  Pulmonary/Chest: Effort normal and breath sounds normal. No respiratory distress.   Abdominal: Soft. He exhibits no distension and no mass. There is no tenderness. No hernia.   Musculoskeletal: He exhibits no edema or deformity.   Neurological: He is alert and oriented to person, place, and time.   Skin: Skin is warm and dry. No rash noted. He is not diaphoretic.   Psychiatric: He has a normal mood and affect. His behavior is normal.   Vitals  reviewed.      Significant Labs:   Recent Lab Results       01/11/19  0410   01/10/19  1421        Immature Granulocytes 0.3 0.3     Immature Grans (Abs) 0.04  Comment:  Mild elevation in immature granulocytes is non specific and   can be seen in a variety of conditions including stress response,   acute inflammation, trauma and pregnancy. Correlation with other   laboratory and clinical findings is essential.   0.04  Comment:  Mild elevation in immature granulocytes is non specific and   can be seen in a variety of conditions including stress response,   acute inflammation, trauma and pregnancy. Correlation with other   laboratory and clinical findings is essential.       Albumin 3.1 3.8     Alkaline Phosphatase 79 88     ALT 26 32     Anion Gap 9 9     AST 17 23     Baso # 0.02 0.02     Basophil% 0.2 0.2     Total Bilirubin 1.0  Comment:  For infants and newborns, interpretation of results should be based  on gestational age, weight and in agreement with clinical  observations.  Premature Infant recommended reference ranges:  Up to 24 hours.............<8.0 mg/dL  Up to 48 hours............<12.0 mg/dL  3-5 days..................<15.0 mg/dL  6-29 days.................<15.0 mg/dL   1.0  Comment:  For infants and newborns, interpretation of results should be based  on gestational age, weight and in agreement with clinical  observations.  Premature Infant recommended reference ranges:  Up to 24 hours.............<8.0 mg/dL  Up to 48 hours............<12.0 mg/dL  3-5 days..................<15.0 mg/dL  6-29 days.................<15.0 mg/dL       BUN, Bld 18 21     Calcium 10.2 10.9     Chloride 109 104     CO2 23 27     Creatinine 1.0 1.1     Differential Method Automated Automated     eGFR if  >60.0 >60.0     eGFR if non  >60.0  Comment:  Calculation used to obtain the estimated glomerular filtration  rate (eGFR) is the CKD-EPI equation.    >60.0  Comment:  Calculation used to obtain the  estimated glomerular filtration  rate (eGFR) is the CKD-EPI equation.        Eos # 0.0 0.0     Eosinophil% 0.0 0.0     Glucose 105 107     Gran # (ANC) 10.7 9.6     Gran% 80.9 80.2     Hematocrit 46.0 49.1     Hemoglobin 15.1 16.7     Lymph # 1.6 1.4     Lymph% 12.2 11.7     Magnesium 1.9 1.8     MCH 30.6 30.5     MCHC 32.8 34.0     MCV 93 90     Mono # 0.8 0.9     Mono% 6.4 7.6     MPV 11.6 11.6     nRBC 0 0     Platelets 187 189     Potassium 3.9 3.4     Total Protein 7.0 8.1     RBC 4.93 5.47     RDW 13.2 13.2     Sodium 141 140     WBC 13.18 11.92         All pertinent labs within the past 24 hours have been reviewed.    Significant Imaging: I have reviewed all pertinent imaging results/findings within the past 24 hours.    Assessment/Plan:      * Acute diarrhea    - acute onset over 1 day, watery  - stool studies, including C. Diff, negative  - likely 2/2 acute viral gastroenteritis  - initiated loperamide and IV antiemetics: zofran and phenergan to alternate  - CT a/p without any evidence of colitis     Elevated amylase and lipase    - unlikely to be pancreatitis; does have elevated lipase/amylase but no abdominal pain or evidence of inflammation on CT  - recent abdominal US w/o any gallstones; also not visualized on CT, will defer repeat imaging for now  - does have elevated Ca (see below), which can cause pancreatitis, but would be very unusual presentation  - could be 2/2 diarrhea/GI source; less likely malignancy given lack of any other symptoms (albiet with hyperCa)  - can pursue further workup if necessary, but would wait for something to declare itself as this could be 2/2 acute gastroenteritis alone  - on maintenance fluids     Hypercalcemia    - unclear origin; does historically have elevated Ca in the past  - ddx includes iatrogenic (patient reports taking Ca supplements) vs medication effect vs malignancy vs hyperparathyroid  - holding HCTZ, home Ca supplements  - PTH elevated, vitamin D low; c/w  PHP with vitamin D deficiency  - will initiate vitamin D supplements  - given history of renal stones, may benefit from further evaluation and possibly surgery  - will need to f/u with PCP       Rheumatoid arthritis involving multiple sites with positive rheumatoid factor    - continue home prednisone  - continue holding etanercept for now pending infectious w/u and outpatient Rheum f/u       ALBARADO (nonalcoholic steatohepatitis)    - mildly elevated AST/ALT and t bili  - largely c/w baseline, improved  - continue to monitor  - consider repeat abdominal US if not improving, but was done <1 month ago and normal at that time       Nephrolithiasis    - incidental 0.6cm kidney stone in distal ureter found upon CT scan  - does have history of renal stones in the past and some microscopic hematuria on UA  - fluids as above  - continue tamsulosin 0.4mg daily  - can f/u with PCP on discharge, may need evaluation for hyperparathyroidism       Syncope due to orthostatic hypotension    - syncopized on 1/9, likely due to dehydration and vagal stimulation in setting of toilet use  - cardiac workup negative, including unremarkable EKG and TTE  - s/p bolus NS; now on maintenance fluids, will encourage diet as tolerated       Viral gastroenteritis    - see diarrhea       Intractable vomiting with nausea    - developed nausea and vomiting, suspect viral gastroenteritis as cause  - IV zofran and phenergan, can alternate dosages for further coverage  - improved today, will attempt to transition to PO meds this afternoon if doing well     Hyperlipidemia    - continue atorvastatin 40       Osteoarthritis    - continue RA treatment       Hypertension    - currently stable  - holding HCTZ  - holding amlodipine 10 and lasix 20 every other day in setting of syncope         VTE Risk Mitigation (From admission, onward)        Ordered     enoxaparin injection 40 mg  Daily      01/08/19 0542     IP VTE HIGH RISK PATIENT  Once      01/08/19 3656               Mohit Phelan MD  Department of Hospital Medicine   [facility]

## 2019-01-11 NOTE — ASSESSMENT & PLAN NOTE
- syncopized on 1/9, likely due to dehydration and vagal stimulation in setting of toilet use  - cardiac workup negative, including unremarkable EKG and TTE  - s/p bolus NS; now on maintenance fluids, will encourage diet as tolerated

## 2019-01-11 NOTE — SUBJECTIVE & OBJECTIVE
Interval History: Greatly improved nausea and vomiting; able to tolerate breakfast this am. He is still having diarrhea. Abdominal discomfort improving.    Review of Systems   Constitutional: Negative for chills, diaphoresis and fever.   HENT: Negative for congestion and sore throat.    Eyes: Negative for discharge and visual disturbance.   Respiratory: Negative for cough and shortness of breath.    Cardiovascular: Negative for chest pain and leg swelling.   Gastrointestinal: Positive for diarrhea, nausea and vomiting. Negative for abdominal pain.   Genitourinary: Negative for dysuria and flank pain.   Musculoskeletal: Negative for arthralgias and joint swelling.   Skin: Negative for rash and wound.   Allergic/Immunologic: Positive for immunocompromised state.   Neurological: Negative for light-headedness and headaches.   Psychiatric/Behavioral: Negative for agitation and confusion.     Objective:     Vital Signs (Most Recent):  Temp: 97.4 °F (36.3 °C) (01/11/19 0455)  Pulse: 77 (01/11/19 0840)  Resp: 18 (01/11/19 0840)  BP: (!) 148/77 (01/11/19 0840)  SpO2: 99 % (01/11/19 0840) Vital Signs (24h Range):  Temp:  [97.4 °F (36.3 °C)-98.4 °F (36.9 °C)] 97.4 °F (36.3 °C)  Pulse:  [70-86] 77  Resp:  [17-18] 18  SpO2:  [95 %-99 %] 99 %  BP: (128-163)/(77-82) 148/77     Weight: 122.9 kg (271 lb)  Body mass index is 40.02 kg/m².    Intake/Output Summary (Last 24 hours) at 1/11/2019 0954  Last data filed at 1/11/2019 0800  Gross per 24 hour   Intake 2890.42 ml   Output 300 ml   Net 2590.42 ml      Physical Exam   Constitutional: He is oriented to person, place, and time. He appears well-developed and well-nourished. No distress.   obese   HENT:   Head: Normocephalic and atraumatic.   Nose: Nose normal.   Eyes: EOM are normal. Pupils are equal, round, and reactive to light. No scleral icterus.   Neck: Normal range of motion. No JVD present. No tracheal deviation present.   Cardiovascular: Normal rate, regular rhythm and normal  heart sounds. Exam reveals no gallop and no friction rub.   No murmur heard.  Pulmonary/Chest: Effort normal and breath sounds normal. No respiratory distress.   Abdominal: Soft. He exhibits no distension and no mass. There is no tenderness. No hernia.   Musculoskeletal: He exhibits no edema or deformity.   Neurological: He is alert and oriented to person, place, and time.   Skin: Skin is warm and dry. No rash noted. He is not diaphoretic.   Psychiatric: He has a normal mood and affect. His behavior is normal.   Vitals reviewed.      Significant Labs:   Recent Lab Results       01/11/19  0410   01/10/19  1421        Immature Granulocytes 0.3 0.3     Immature Grans (Abs) 0.04  Comment:  Mild elevation in immature granulocytes is non specific and   can be seen in a variety of conditions including stress response,   acute inflammation, trauma and pregnancy. Correlation with other   laboratory and clinical findings is essential.   0.04  Comment:  Mild elevation in immature granulocytes is non specific and   can be seen in a variety of conditions including stress response,   acute inflammation, trauma and pregnancy. Correlation with other   laboratory and clinical findings is essential.       Albumin 3.1 3.8     Alkaline Phosphatase 79 88     ALT 26 32     Anion Gap 9 9     AST 17 23     Baso # 0.02 0.02     Basophil% 0.2 0.2     Total Bilirubin 1.0  Comment:  For infants and newborns, interpretation of results should be based  on gestational age, weight and in agreement with clinical  observations.  Premature Infant recommended reference ranges:  Up to 24 hours.............<8.0 mg/dL  Up to 48 hours............<12.0 mg/dL  3-5 days..................<15.0 mg/dL  6-29 days.................<15.0 mg/dL   1.0  Comment:  For infants and newborns, interpretation of results should be based  on gestational age, weight and in agreement with clinical  observations.  Premature Infant recommended reference ranges:  Up to 24  hours.............<8.0 mg/dL  Up to 48 hours............<12.0 mg/dL  3-5 days..................<15.0 mg/dL  6-29 days.................<15.0 mg/dL       BUN, Bld 18 21     Calcium 10.2 10.9     Chloride 109 104     CO2 23 27     Creatinine 1.0 1.1     Differential Method Automated Automated     eGFR if  >60.0 >60.0     eGFR if non  >60.0  Comment:  Calculation used to obtain the estimated glomerular filtration  rate (eGFR) is the CKD-EPI equation.    >60.0  Comment:  Calculation used to obtain the estimated glomerular filtration  rate (eGFR) is the CKD-EPI equation.        Eos # 0.0 0.0     Eosinophil% 0.0 0.0     Glucose 105 107     Gran # (ANC) 10.7 9.6     Gran% 80.9 80.2     Hematocrit 46.0 49.1     Hemoglobin 15.1 16.7     Lymph # 1.6 1.4     Lymph% 12.2 11.7     Magnesium 1.9 1.8     MCH 30.6 30.5     MCHC 32.8 34.0     MCV 93 90     Mono # 0.8 0.9     Mono% 6.4 7.6     MPV 11.6 11.6     nRBC 0 0     Platelets 187 189     Potassium 3.9 3.4     Total Protein 7.0 8.1     RBC 4.93 5.47     RDW 13.2 13.2     Sodium 141 140     WBC 13.18 11.92         All pertinent labs within the past 24 hours have been reviewed.    Significant Imaging: I have reviewed all pertinent imaging results/findings within the past 24 hours.

## 2019-01-11 NOTE — ASSESSMENT & PLAN NOTE
- mildly elevated AST/ALT and t bili  - largely c/w baseline, improved  - continue to monitor  - consider repeat abdominal US if not improving, but was done <1 month ago and normal at that time

## 2019-01-11 NOTE — NURSING
Pt awake, alert resp even and unlabored.  Skin warm and dry to touch.  HOB up at 35 degrees.  Speech clear able to make his needs known.  No nausea  Or vomiting  noted at this time.  Safety measures in place.

## 2019-01-11 NOTE — ASSESSMENT & PLAN NOTE
- developed nausea and vomiting, suspect viral gastroenteritis as cause  - IV zofran and phenergan, can alternate dosages for further coverage  - improved today, will attempt to transition to PO meds this afternoon if doing well

## 2019-01-11 NOTE — ASSESSMENT & PLAN NOTE
- unclear origin; does historically have elevated Ca in the past  - ddx includes iatrogenic (patient reports taking Ca supplements) vs medication effect vs malignancy vs hyperparathyroid  - holding HCTZ, home Ca supplements  - PTH elevated, vitamin D low; c/w PHP with vitamin D deficiency  - will initiate vitamin D supplements  - given history of renal stones, may benefit from further evaluation and possibly surgery  - will need to f/u with PCP

## 2019-01-12 VITALS
TEMPERATURE: 98 F | DIASTOLIC BLOOD PRESSURE: 64 MMHG | SYSTOLIC BLOOD PRESSURE: 121 MMHG | HEART RATE: 63 BPM | HEIGHT: 69 IN | BODY MASS INDEX: 40.14 KG/M2 | OXYGEN SATURATION: 99 % | WEIGHT: 271 LBS | RESPIRATION RATE: 18 BRPM

## 2019-01-12 DIAGNOSIS — K63.5 POLYP OF COLON, UNSPECIFIED PART OF COLON, UNSPECIFIED TYPE: Primary | ICD-10-CM

## 2019-01-12 DIAGNOSIS — K92.2 GASTROINTESTINAL HEMORRHAGE, UNSPECIFIED GASTROINTESTINAL HEMORRHAGE TYPE: ICD-10-CM

## 2019-01-12 PROBLEM — K92.1 MELENA: Status: ACTIVE | Noted: 2019-01-12

## 2019-01-12 LAB
ALBUMIN SERPL BCP-MCNC: 2.7 G/DL
ALP SERPL-CCNC: 61 U/L
ALT SERPL W/O P-5'-P-CCNC: 19 U/L
ANION GAP SERPL CALC-SCNC: 7 MMOL/L
ANISOCYTOSIS BLD QL SMEAR: SLIGHT
AST SERPL-CCNC: 17 U/L
BASOPHILS # BLD AUTO: 0.02 K/UL
BASOPHILS # BLD AUTO: 0.03 K/UL
BASOPHILS NFR BLD: 0.2 %
BASOPHILS NFR BLD: 0.3 %
BILIRUB SERPL-MCNC: 0.8 MG/DL
BUN SERPL-MCNC: 12 MG/DL
BURR CELLS BLD QL SMEAR: ABNORMAL
CALCIUM SERPL-MCNC: 9.6 MG/DL
CHLORIDE SERPL-SCNC: 113 MMOL/L
CO2 SERPL-SCNC: 20 MMOL/L
CREAT SERPL-MCNC: 0.8 MG/DL
DACRYOCYTES BLD QL SMEAR: ABNORMAL
DIFFERENTIAL METHOD: ABNORMAL
DIFFERENTIAL METHOD: ABNORMAL
EOSINOPHIL # BLD AUTO: 0 K/UL
EOSINOPHIL # BLD AUTO: 0 K/UL
EOSINOPHIL NFR BLD: 0.3 %
EOSINOPHIL NFR BLD: 0.3 %
ERYTHROCYTE [DISTWIDTH] IN BLOOD BY AUTOMATED COUNT: 13.4 %
ERYTHROCYTE [DISTWIDTH] IN BLOOD BY AUTOMATED COUNT: 13.4 %
EST. GFR  (AFRICAN AMERICAN): >60 ML/MIN/1.73 M^2
EST. GFR  (NON AFRICAN AMERICAN): >60 ML/MIN/1.73 M^2
GLUCOSE SERPL-MCNC: 82 MG/DL
HCT VFR BLD AUTO: 37.4 %
HCT VFR BLD AUTO: 41.3 %
HGB BLD-MCNC: 12.7 G/DL
HGB BLD-MCNC: 13.4 G/DL
HYPOCHROMIA BLD QL SMEAR: ABNORMAL
IMM GRANULOCYTES # BLD AUTO: 0.02 K/UL
IMM GRANULOCYTES # BLD AUTO: 0.13 K/UL
IMM GRANULOCYTES NFR BLD AUTO: 0.2 %
IMM GRANULOCYTES NFR BLD AUTO: 1.2 %
LYMPHOCYTES # BLD AUTO: 1.7 K/UL
LYMPHOCYTES # BLD AUTO: 2.6 K/UL
LYMPHOCYTES NFR BLD: 17.6 %
LYMPHOCYTES NFR BLD: 24.9 %
MAGNESIUM SERPL-MCNC: 1.7 MG/DL
MCH RBC QN AUTO: 30.2 PG
MCH RBC QN AUTO: 30.5 PG
MCHC RBC AUTO-ENTMCNC: 32.4 G/DL
MCHC RBC AUTO-ENTMCNC: 34 G/DL
MCV RBC AUTO: 90 FL
MCV RBC AUTO: 93 FL
MONOCYTES # BLD AUTO: 0.5 K/UL
MONOCYTES # BLD AUTO: 0.5 K/UL
MONOCYTES NFR BLD: 4.8 %
MONOCYTES NFR BLD: 5.3 %
NEUTROPHILS # BLD AUTO: 7.1 K/UL
NEUTROPHILS # BLD AUTO: 7.2 K/UL
NEUTROPHILS NFR BLD: 68.5 %
NEUTROPHILS NFR BLD: 76.4 %
NRBC BLD-RTO: 0 /100 WBC
NRBC BLD-RTO: 0 /100 WBC
OVALOCYTES BLD QL SMEAR: ABNORMAL
PLATELET # BLD AUTO: 143 K/UL
PLATELET # BLD AUTO: ABNORMAL K/UL
PMV BLD AUTO: 11.2 FL
PMV BLD AUTO: ABNORMAL FL
POIKILOCYTOSIS BLD QL SMEAR: SLIGHT
POLYCHROMASIA BLD QL SMEAR: ABNORMAL
POTASSIUM SERPL-SCNC: 3.6 MMOL/L
PROT SERPL-MCNC: 5.8 G/DL
RBC # BLD AUTO: 4.16 M/UL
RBC # BLD AUTO: 4.43 M/UL
SODIUM SERPL-SCNC: 140 MMOL/L
WBC # BLD AUTO: 10.56 K/UL
WBC # BLD AUTO: 9.35 K/UL

## 2019-01-12 PROCEDURE — 99222 1ST HOSP IP/OBS MODERATE 55: CPT | Mod: GC,,, | Performed by: INTERNAL MEDICINE

## 2019-01-12 PROCEDURE — 99222 PR INITIAL HOSPITAL CARE,LEVL II: ICD-10-PCS | Mod: GC,,, | Performed by: INTERNAL MEDICINE

## 2019-01-12 PROCEDURE — 36415 COLL VENOUS BLD VENIPUNCTURE: CPT

## 2019-01-12 PROCEDURE — C9113 INJ PANTOPRAZOLE SODIUM, VIA: HCPCS | Performed by: HOSPITALIST

## 2019-01-12 PROCEDURE — 99239 HOSP IP/OBS DSCHRG MGMT >30: CPT | Mod: ,,, | Performed by: HOSPITALIST

## 2019-01-12 PROCEDURE — 99239 PR HOSPITAL DISCHARGE DAY,>30 MIN: ICD-10-PCS | Mod: ,,, | Performed by: HOSPITALIST

## 2019-01-12 PROCEDURE — 85025 COMPLETE CBC W/AUTO DIFF WBC: CPT

## 2019-01-12 PROCEDURE — 83735 ASSAY OF MAGNESIUM: CPT

## 2019-01-12 PROCEDURE — 63600175 PHARM REV CODE 636 W HCPCS: Performed by: HOSPITALIST

## 2019-01-12 PROCEDURE — 80053 COMPREHEN METABOLIC PANEL: CPT

## 2019-01-12 PROCEDURE — 25000003 PHARM REV CODE 250: Performed by: HOSPITALIST

## 2019-01-12 RX ORDER — TAMSULOSIN HYDROCHLORIDE 0.4 MG/1
0.4 CAPSULE ORAL DAILY
Qty: 30 CAPSULE | Refills: 11 | Status: CANCELLED | OUTPATIENT
Start: 2019-01-13 | End: 2020-01-13

## 2019-01-12 RX ORDER — OMEPRAZOLE 20 MG/1
40 CAPSULE, DELAYED RELEASE ORAL 2 TIMES DAILY
Qty: 120 CAPSULE | Refills: 11 | Status: SHIPPED | OUTPATIENT
Start: 2019-01-12 | End: 2019-01-29 | Stop reason: SDUPTHER

## 2019-01-12 RX ORDER — PROMETHAZINE HYDROCHLORIDE 25 MG/1
25 TABLET ORAL EVERY 6 HOURS PRN
Qty: 20 TABLET | Refills: 0 | Status: SHIPPED | OUTPATIENT
Start: 2019-01-12

## 2019-01-12 RX ORDER — ERGOCALCIFEROL 1.25 MG/1
50000 CAPSULE ORAL
Qty: 8 CAPSULE | Refills: 0 | Status: SHIPPED | OUTPATIENT
Start: 2019-01-18 | End: 2019-01-15 | Stop reason: ALTCHOICE

## 2019-01-12 RX ORDER — LOPERAMIDE HYDROCHLORIDE 2 MG/1
2 CAPSULE ORAL 4 TIMES DAILY PRN
Qty: 15 CAPSULE | Refills: 0 | Status: SHIPPED | OUTPATIENT
Start: 2019-01-12 | End: 2019-01-22

## 2019-01-12 RX ORDER — PANTOPRAZOLE SODIUM 40 MG/10ML
40 INJECTION, POWDER, LYOPHILIZED, FOR SOLUTION INTRAVENOUS 2 TIMES DAILY
Status: DISCONTINUED | OUTPATIENT
Start: 2019-01-12 | End: 2019-01-12 | Stop reason: HOSPADM

## 2019-01-12 RX ADMIN — PREDNISONE 10 MG: 10 TABLET ORAL at 08:01

## 2019-01-12 RX ADMIN — TAMSULOSIN HYDROCHLORIDE 0.4 MG: 0.4 CAPSULE ORAL at 08:01

## 2019-01-12 RX ADMIN — PANTOPRAZOLE SODIUM 40 MG: 40 INJECTION, POWDER, FOR SOLUTION INTRAVENOUS at 08:01

## 2019-01-12 RX ADMIN — SODIUM CHLORIDE: 0.9 INJECTION, SOLUTION INTRAVENOUS at 05:01

## 2019-01-12 RX ADMIN — ATORVASTATIN CALCIUM 40 MG: 20 TABLET, FILM COATED ORAL at 08:01

## 2019-01-12 NOTE — ASSESSMENT & PLAN NOTE
- developed episode of melena on 1/11 with mild Hb drop (15->12.7 and now increasing on repeat Hb today to 13.4)  - currently resolved, no further diarrhea today and normal brown stool on exam  - GI consulted: recommend increase omeprazole to 40mg bid dosing with outpatient EGD/cscope

## 2019-01-12 NOTE — PLAN OF CARE
Problem: Adult Inpatient Plan of Care  Goal: Plan of Care Review  Outcome: Ongoing (interventions implemented as appropriate)  Patient AAox4 and VSS throughout the night. Q2H rounding and white board updating maintained. Call bell within reach. Patient had no complaints of pain throughout the night. No falls or skin breakdown noted. Patient tolerating oral fluids better. Patient had goals to eat solid food today. WCTM.

## 2019-01-12 NOTE — CONSULTS
[facility]  Gastroenterology  Consult Note    Patient Name: Massimo Funez  MRN: [id number]  Admission Date: 1/8/2019  Hospital Length of Stay: 4 days  Code Status: Full Code   Attending Provider: Mohit Phelan, *   Consulting Provider: Kevin Herrera MD  Primary Care Physician: Corey Kwok MD  Principal Problem:Acute diarrhea    Inpatient consult to Gastroenterology  Consult performed by: Kevin Herrera MD  Consult ordered by: Mohit Phelan MD        Subjective:     HPI:  Mr Funez is a 58 year old man with history of RA on prednisone and embrel (started 12/20), latent syphilis, HTN, HepC (neg PCR), ALBARADO, who presented to the hospital on 1/8 due to diarrhea; GI consulted due to concern for drop in H&H and an concern for melena.    Per H&P he presented on 1/8 with 1 day of nausea and diarrhea (watery, non-bloody, non-melenic). Was also noted to have vomitus. Denied abdominal pain on admission. Labs on admission notable for normal CBC (hgb 15.6, baseline 13.5 8/2018) which trended to 15.4 -> 16.7 -> 15.1 -> 12.7 -> . BMP unremarkable. LFT normal. Lipase elevated (567) on admission. Stool studies were sent with negative c.diff, stool culture. CT AP (1/8) with unremarkable pancreas, stomach, SI/LI and liver.    He reports that he started embrel ~12/2018 and ~1 week thereafter he developed a viral illness and subsequently his girlfriend developed the flu (recently started on antibiotics for persistent symptoms) therefore did not take the next dose. He was feeling well until ~2-3 days prior to admission when he noted that after eating yogurt in the evening he started to develop a queezy sensation in his abdomen. Over the next day he developped frequent diarrheal episodes (watery, non-bloody but ?dark) for which he presented to the hospital for evaluation. He notes that he felt nauseated but without vomiting until he was admitted when he started to have several  "episodes of vomitus (non-bloody, ?an episode of dark emesis).    Since admission he was managed conservatively with improvement of symptoms and was able to advance diet.He had an episode of synocpe on 1/9 - 1/10 while on bathroom "reports that while he was on the toilet, he passed loose stool, felt progressively weaker, dizzy and so called the nurse" which was felt to be secondary to dehydration for which he was given IV hydration.     GI consulted due to concern for decline in H&H. On admission H&H 15.6 which up trended to 16.7 and declined to 12.7 on 1/11 -> 12.7 -> 13.4 (1/12). He denies any history of Gi bleed. Denies any prior EGD, but notes colonoscopy was 2016 with 1-2 polyps with 3 year follow-up (in [place]). He notes that right now he feels back to baseline, with return of his appetite, resolution of his diarrhea (last BM yesterday 1/11 AM), nausea improving and no additional vomitus. Review otherwise as below.    Rx Review  - protonix 40mg daily, changed to BID on 1/12 [omeprazole 20mg daily home medication]  - lovenox 40mg daily (last dose 1/11)  - prednisone 10mg daily [home medication]  - loperamide 2mg QID PRN (last given 1/11 x1 dose)  - zofran 8mg q6h IV (not given)  - phenegren 12.5mg q6h (last dosed 1/10)  - denies any NSAID. No smoking. No ETOH (quit 12/2018 with diagnosis of ALBARADO)        Past Medical History:   Diagnosis Date    Hyperlipidemia     Hypertension     Nephrolithiasis 1/8/2019    Rheumatoid arthritis     Rheumatoid arthritis involving multiple sites with positive rheumatoid factor 4/16/2018    Syphilis        Past Surgical History:   Procedure Laterality Date    LIVER BIOPSY         Review of patient's allergies indicates:  No Known Allergies  Family History     Problem Relation (Age of Onset)    Diabetes Mellitus Mother    Hypertension Father    Inflammatory bowel disease Mother    Osteoarthritis Mother    Stroke Father        Tobacco Use    Smoking status: Former Smoker    " Smokeless tobacco: Never Used   Substance and Sexual Activity    Alcohol use: No     Frequency: Never    Drug use: No    Sexual activity: Yes     Review of Systems   Constitutional: Negative for activity change, appetite change (resolved decreased appetite), chills, fatigue, fever and unexpected weight change (trying to lose weight through diet since diagnosis ALBARADO).   HENT: Negative for sore throat and trouble swallowing.    Eyes: Negative for visual disturbance.   Respiratory: Negative for cough and shortness of breath.    Cardiovascular: Negative for chest pain.   Gastrointestinal: Negative for abdominal pain (on admission abdominal queezy sensation but no pain), anal bleeding, blood in stool, constipation, diarrhea (resolved, last episode 1/11), nausea and vomiting.   Genitourinary: Negative for dysuria.   Musculoskeletal: Negative for arthralgias and myalgias.   Skin: Negative for rash.   Neurological: Negative for dizziness and light-headedness.   Psychiatric/Behavioral: Negative for agitation and confusion.     Objective:     Vital Signs (Most Recent):  Temp: 98.4 °F (36.9 °C) (01/12/19 1257)  Pulse: 64 (01/12/19 1257)  Resp: 18 (01/12/19 1257)  BP: 126/76 (01/12/19 1257)  SpO2: 99 % (01/12/19 1257) Vital Signs (24h Range):  Temp:  [97.3 °F (36.3 °C)-98.7 °F (37.1 °C)] 98.4 °F (36.9 °C)  Pulse:  [63-77] 64  Resp:  [18] 18  SpO2:  [95 %-99 %] 99 %  BP: (126-138)/(65-80) 126/76     Weight: 122.9 kg (271 lb) (01/10/19 0717)  Body mass index is 40.02 kg/m².      Intake/Output Summary (Last 24 hours) at 1/12/2019 1358  Last data filed at 1/12/2019 0930  Gross per 24 hour   Intake 1640 ml   Output --   Net 1640 ml       Lines/Drains/Airways     Peripheral Intravenous Line                 Peripheral IV - Single Lumen 01/10/19 1705 Right Forearm 1 day                Physical Exam   Constitutional: He is oriented to person, place, and time. He appears well-developed and well-nourished. No distress.   Well appearing  middle aged man, no distress. Sitting in chair.     HENT:   Head: Normocephalic and atraumatic.   Mouth/Throat: Oropharynx is clear and moist. No oropharyngeal exudate.   Eyes: Conjunctivae are normal. No scleral icterus.   Cardiovascular: Normal rate, regular rhythm and normal heart sounds.   Pulmonary/Chest: Effort normal and breath sounds normal. No respiratory distress.   Abdominal: Soft. Bowel sounds are normal. He exhibits no distension and no mass. There is no tenderness. There is no rebound and no guarding.   Obese abdomen, soft non-tender. Rectal without palpable masses, light brown stool.   Musculoskeletal: He exhibits no edema or tenderness.   Lymphadenopathy:     He has no cervical adenopathy.   Neurological: He is alert and oriented to person, place, and time.   Skin: Skin is warm. Capillary refill takes less than 2 seconds. He is not diaphoretic.   Psychiatric: He has a normal mood and affect. His behavior is normal. Judgment and thought content normal.   Nursing note and vitals reviewed.      Significant Labs:  CBC:   Recent Labs   Lab 01/11/19  0410 01/11/19  1847 01/12/19  0503 01/12/19  1058   WBC 13.18*  --  10.56 9.35   HGB 15.1 12.7* 12.7* 13.4*   HCT 46.0  --  37.4* 41.3     --  SEE COMMENT 143*     CMP:   Recent Labs   Lab 01/12/19  0503   GLU 82   CALCIUM 9.6   ALBUMIN 2.7*   PROT 5.8*      K 3.6   CO2 20*   *   BUN 12   CREATININE 0.8   ALKPHOS 61   ALT 19   AST 17   BILITOT 0.8     Coagulation: No results for input(s): PT, INR, APTT in the last 48 hours.    Lab Results   Component Value Date    LIPASE 567 (H) 01/08/2019     Lab Results   Component Value Date    AMYLASE 403 (H) 01/08/2019       Significant Imaging:  Imaging results within the past 24 hours have been reviewed.    Assessment/Plan:     Elevated amylase and lipase    Mr Funez is a 58 year old man with history of RA on prednisone and embrel (started 12/20), latent syphilis, HTN, HepC who presented to the  hospital on 1/8 due to diarrhea; GI consulted due to concern for drop in H&H and concern for melena.    On presentation his labs notable for likely hemoconcentration with hgb of 15.6 with baseline hgb 13-14 in setting of dehydration form nausea, vomiting, diarrhea and decreased PO intake. His H&H was trended and decreased to 12.7 which has improved on 1/12 to 13.4 (within baseline). No clear history of GI bleed though he reports ?dark emesis and ?dark bowel movements - but with brown stool on MARYELLEN, hemodynamic stability, and normal BUN. Given history of vomitus and ?retching he would be at risk for greg-neal tear, or the elevated lipase could reflect a duodenal ulcer.    Unclear the cause of the elevated lipase. Unlikely acute pancreatitis given negative abdominal CT and no history of abdominal pain. Lipase can be elevated in other causes, and given ?GI bleed could be secondary to a duodenal ulcer, or acute gastroenteritis. He recently started embrel though not noted to cause pancreatitis/elevated lipase. He is on HCTZ which can cause pancreatitis (though as above no clear history of pancreatitis).    Likely acute episode of nausea, vomiting and diarrhea were secondary to a viral illness given transient episode, self-resolving, and negative hospital workup. Currently resolved and tolerating diet.    Plan  - given stable-improving H&H with negative MARYELLEN plan for outpatient EGD  - unclear history of c-scope (endorses history of colonoscopy in 2016 requiring 3 yr follow-up). Will plan for outpatient colonoscopy  - protonix 40mg BID on discharge  - ALBARADO, followed by hepatology clinic. F3 on fibroscan. Continue outpatient follow-up.   - will plan for clinic follow-up (will request)  - will review lipase and imaging and may warrant outpatient EUS  - plan discussed with primary team           Thank you for your consult. I will sign off. Please contact us if you have any additional questions.    Kevin Herrera,  MD  Gastroenterology  [facility]

## 2019-01-12 NOTE — PLAN OF CARE
Problem: Fall Injury Risk  Goal: Absence of Fall and Fall-Related Injury  Outcome: Ongoing (interventions implemented as appropriate)  Pt ambulates to the bathroom, gait steady, pt free from falls.  Safety measures in place.

## 2019-01-12 NOTE — DISCHARGE SUMMARY
[facility] Hospital Medicine  Discharge Summary      Patient Name: Massimo Funez  MRN: [id number]  Admission Date: 1/8/2019  Hospital Length of Stay: 4 days  Discharge Date and Time:  01/12/2019 4:56 PM  Attending Physician: Mohit Phelan, *   Discharging Provider: Mohit Phelan MD  Primary Care Provider: Corey Kwok MD  Hospital Medicine Team: [facility] Mohit Phelan MD    HPI:   Mr. Funez is a 57yo man with ALBARADO, RA on prednisone and etanercept, and history of nephrolithiasis who presents to the ED with 1 day of nausea and loose diarrhea. He states that yesterday evening he started experiencing some mild nausea without emesis and began having watery diarrhea. Reports multiple episodes of loose, watery yellow stool. Non-bloody, no melena. No associated abdominal pain; although he does report some vague discomfort. Diarrhea has continued this am. Otherwise, he is afebrile and with no other acute complaints. States he has been eating healthier lately after being diagnosed with ALBARADO and has stopped drinking and smoking. Did note episode of reflux 3d ago, but otherwise has been in normal state of health.    He recently started etanercept for his RA and has taken one dose so far on 12/20; additional doses were held due to concern that he had a cold and his wife had the flu so he did not want to risk an infection.    * No surgery found *      Hospital Course:   Mr. Funez presented with intractable nausea, vomiting, and diarrhea 2/2 suspected viral gastroenteritis. Also with elevated lipase on admission, although no other signs/symptoms of pancreatitis and his CT scan was negative for pancreatic inflammation. Hospital course complicated by episode of syncope, likely 2/2 dehydration. He also developed melena x1 day; Hb was monitored and this resolved quickly. GI evaluated while in house and will follow up with outpatient EGD/cscope and possibly EUS to  further evaluate cause of lipase elevation.    Vitals:    01/12/19 1616   BP: 121/64   Pulse: 63   Resp: 18   Temp: 98.2 °F (36.8 °C)     Physical Exam   Constitutional: He is oriented to person, place, and time. He appears well-developed and well-nourished. No distress.   obese   HENT:   Head: Normocephalic and atraumatic.   Nose: Nose normal.   Eyes: EOM are normal. Pupils are equal, round, and reactive to light. No scleral icterus.   Neck: Normal range of motion. No JVD present. No tracheal deviation present.   Cardiovascular: Normal rate, regular rhythm and normal heart sounds. Exam reveals no gallop and no friction rub.   No murmur heard.  Pulmonary/Chest: Effort normal and breath sounds normal. No respiratory distress.   Abdominal: Soft. He exhibits no distension and no mass. There is no tenderness. No hernia.   Musculoskeletal: He exhibits no edema or deformity.   Neurological: He is alert and oriented to person, place, and time.   Skin: Skin is warm and dry. No rash noted. He is not diaphoretic.   Psychiatric: He has a normal mood and affect. His behavior is normal.   Vitals reviewed.      Consults:   Consults (From admission, onward)        Status Ordering Provider     Inpatient consult to Gastroenterology  Once     Provider:  (Not yet assigned)    Completed ELIAS VILA     Inpatient consult to PICC team ([facility])  Once     Provider:  (Not yet assigned)    Completed ELIAS VILA.          * Acute diarrhea    - acute onset over 1 day, watery  - stool studies, including C. Diff, negative  - likely 2/2 acute viral gastroenteritis  - initiated loperamide and IV antiemetics: zofran and phenergan to alternate  - CT a/p without any evidence of colitis  - developed melena on 1/11 which resolved on its own: GI consulted and plan for outpatient EGD/cscopce     Elevated amylase and lipase    - unlikely to be pancreatitis; does have elevated lipase/amylase but no abdominal pain or evidence of  inflammation on CT  - recent abdominal US w/o any gallstones; also not visualized on CT, will defer repeat imaging for now  - does have elevated Ca (see below), which can cause pancreatitis, but would be very unusual presentation  - could be 2/2 diarrhea/GI source; less likely malignancy given lack of any other symptoms (albiet with hyperCa)  - GI consulted: can consider outpatient EUS to r/o pancreatic pathology     Hypercalcemia    - unclear origin; does historically have elevated Ca in the past  - ddx includes iatrogenic (patient reports taking Ca supplements) vs medication effect vs malignancy vs hyperparathyroid  - holding HCTZ, home Ca supplements  - PTH elevated, vitamin D low; c/w PHP with vitamin D deficiency  - will initiate vitamin D supplements  - given history of renal stones, may benefit from further evaluation and possibly surgery  - will need to f/u with PCP       Rheumatoid arthritis involving multiple sites with positive rheumatoid factor    - continue home prednisone  - continue holding etanercept for now pending infectious w/u and outpatient Rheum f/u       ALBARADO (nonalcoholic steatohepatitis)    - mildly elevated AST/ALT and t bili  - largely c/w baseline, improved  - continue to monitor  - consider repeat abdominal US if not improving, but was done <1 month ago and normal at that time       Nephrolithiasis    - incidental 0.6cm kidney stone in distal ureter found upon CT scan  - does have history of renal stones in the past and some microscopic hematuria on UA  - fluids as above  - continue tamsulosin 0.4mg daily  - can f/u with PCP on discharge, may need evaluation for hyperparathyroidism       Melena    - developed episode of melena on 1/11 with mild Hb drop (15->12.7 and now increasing on repeat Hb today to 13.4)  - currently resolved, no further diarrhea today and normal brown stool on exam  - GI consulted: recommend increase omeprazole to 40mg bid dosing with outpatient EGD/cscope        Syncope due to orthostatic hypotension    - syncopized on 1/9, likely due to dehydration and vagal stimulation in setting of toilet use  - cardiac workup negative, including unremarkable EKG and TTE  - s/p bolus NS; resolution of symptoms       Viral gastroenteritis    - see diarrhea       Intractable vomiting with nausea    - developed nausea and vomiting, suspect viral gastroenteritis as cause  - IV zofran and phenergan, can alternate dosages for further coverage  - resolved     Hyperlipidemia    - continue atorvastatin 40       Osteoarthritis    - continue RA treatment       Hypertension    - currently stable  - holding HCTZ  - resume amlodipine 10 and lasix 20 every other day         Final Active Diagnoses:    Diagnosis Date Noted POA    PRINCIPAL PROBLEM:  Acute diarrhea [R19.7] 01/08/2019 Yes    Elevated amylase and lipase [R74.8] 01/08/2019 Yes    Hypercalcemia [E83.52] 01/08/2019 Yes    Rheumatoid arthritis involving multiple sites with positive rheumatoid factor [M05.79] 04/16/2018 Yes    ALBARADO (nonalcoholic steatohepatitis) [K75.81] 01/08/2019 Yes    Nephrolithiasis [N20.0] 01/08/2019 Yes    Melena [K92.1] 01/12/2019 No    Syncope due to orthostatic hypotension [I95.1] 01/10/2019 Yes    Intractable vomiting with nausea [R11.2] 01/09/2019 Yes    Viral gastroenteritis [A08.4] 01/09/2019 Yes    Hypertension [I10] 08/24/2018 Yes    Osteoarthritis [M19.90] 08/24/2018 Yes    Hyperlipidemia [E78.5] 08/24/2018 Yes      Problems Resolved During this Admission:       Discharged Condition: good    Disposition: Home or Self Care    Follow Up:  Follow-up Information     Corey Kwok MD In 2 weeks.    Specialty:  Family Medicine  Contact information:  [address]  [facility] [id number]  [phone]             [facility] GASTROENTEROLOGY.    Specialty:  Gastroenterology  Contact information:  [address]  [facility] [id number] [phone]               Patient Instructions:       Ambulatory Referral to Gastroenterology   Referral Priority: Routine Referral Type: Consultation   Referral Reason: Specialty Services Required   Requested Specialty: Gastroenterology   Number of Visits Requested: 1     Diet Adult Regular     Notify your health care provider if you experience any of the following:  severe uncontrolled pain     Notify your health care provider if you experience any of the following:  persistent nausea and vomiting or diarrhea     Notify your health care provider if you experience any of the following:  persistent dizziness, light-headedness, or visual disturbances     Activity as tolerated       Significant Diagnostic Studies: Labs:   BMP:   Recent Labs   Lab 01/11/19  0410 01/12/19  0503    82    140   K 3.9 3.6    113*   CO2 23 20*   BUN 18 12   CREATININE 1.0 0.8   CALCIUM 10.2 9.6   MG 1.9 1.7   , CMP   Recent Labs   Lab 01/11/19  0410 01/12/19  0503    140   K 3.9 3.6    113*   CO2 23 20*    82   BUN 18 12   CREATININE 1.0 0.8   CALCIUM 10.2 9.6   PROT 7.0 5.8*   ALBUMIN 3.1* 2.7*   BILITOT 1.0 0.8   ALKPHOS 79 61   AST 17 17   ALT 26 19   ANIONGAP 9 7*   ESTGFRAFRICA >60.0 >60.0   EGFRNONAA >60.0 >60.0   , CBC   Recent Labs   Lab 01/11/19  0410 01/11/19  1847 01/12/19  0503 01/12/19  1058   WBC 13.18*  --  10.56 9.35   HGB 15.1 12.7* 12.7* 13.4*   HCT 46.0  --  37.4* 41.3     --  SEE COMMENT 143*   , INR   Lab Results   Component Value Date    INR 1.0 01/08/2019   , Lipid Panel   Lab Results   Component Value Date    CHOL 122 01/09/2019    HDL 31 (L) 01/09/2019    LDLCALC 68.6 01/09/2019    TRIG 112 01/09/2019    CHOLHDL 25.4 01/09/2019   , Troponin No results for input(s): TROPONINI in the last 168 hours., A1C:   Recent Labs   Lab 01/08/19  0954   HGBA1C 6.0*    and All labs within the past 24 hours have been reviewed  Radiology: CT scan: CT ABDOMEN PELVIS WITHOUT CONTRAST: No results found for this visit on 01/08/19.    Pending  Diagnostic Studies:     None         Medications:  Reconciled Home Medications:      Medication List      START taking these medications    ergocalciferol 50,000 unit Cap  Commonly known as:  ERGOCALCIFEROL  Take 1 capsule (50,000 Units total) by mouth every 7 days.  Start taking on:  1/18/2019     loperamide 2 mg capsule  Commonly known as:  IMODIUM  Take 1 capsule (2 mg total) by mouth 4 (four) times daily as needed for Diarrhea.     promethazine 25 MG tablet  Commonly known as:  PHENERGAN  Take 1 tablet (25 mg total) by mouth every 6 (six) hours as needed for Nausea.        CHANGE how you take these medications    omeprazole 20 MG capsule  Commonly known as:  PRILOSEC  Take 2 capsules (40 mg total) by mouth 2 (two) times daily.  What changed:    · how much to take  · when to take this        CONTINUE taking these medications    amlodipine-atorvastatin 10-40 mg per tablet  Commonly known as:  CADUET  Take 1 tablet by mouth once daily.     ENBREL SURECLICK 50 mg/mL (0.98 mL) Pnij  Generic drug:  etanercept  Inject 50 mg into the skin every 7 days     fish oil-omega-3 fatty acids 300-1,000 mg capsule  Take 2 g by mouth once daily.     furosemide 20 MG tablet  Commonly known as:  LASIX  Take 20 mg by mouth as needed.     predniSONE 10 MG tablet  Commonly known as:  DELTASONE  Take 10 mg by mouth once daily.     sildenafil 50 MG tablet  Commonly known as:  VIAGRA  Take 50 mg by mouth.        STOP taking these medications    hydroCHLOROthiazide 25 MG tablet  Commonly known as:  HYDRODIURIL            Indwelling Lines/Drains at time of discharge:   Lines/Drains/Airways          None          Time spent on the discharge of patient: 35 minutes  Patient was seen and examined on the date of discharge and determined to be suitable for discharge.         Mohit Phelan MD  Department of Hospital Medicine  [facility]

## 2019-01-12 NOTE — NURSING
Pt asleep resp even and unlabored.  Skin warm and dry to touch. Aroused to verbal stimuli. Pt Broseley x 4 . Clear speech able to make his needs known. Safety measures in place.

## 2019-01-12 NOTE — ASSESSMENT & PLAN NOTE
- developed nausea and vomiting, suspect viral gastroenteritis as cause  - IV zofran and phenergan, can alternate dosages for further coverage  - resolved

## 2019-01-12 NOTE — NURSING
Pt discharged home per md , discharge instructions given , pt verbalized understanding. IV removed.

## 2019-01-12 NOTE — SUBJECTIVE & OBJECTIVE
Past Medical History:   Diagnosis Date    Hyperlipidemia     Hypertension     Nephrolithiasis 1/8/2019    Rheumatoid arthritis     Rheumatoid arthritis involving multiple sites with positive rheumatoid factor 4/16/2018    Syphilis        Past Surgical History:   Procedure Laterality Date    LIVER BIOPSY         Review of patient's allergies indicates:  No Known Allergies  Family History     Problem Relation (Age of Onset)    Diabetes Mellitus Mother    Hypertension Father    Inflammatory bowel disease Mother    Osteoarthritis Mother    Stroke Father        Tobacco Use    Smoking status: Former Smoker    Smokeless tobacco: Never Used   Substance and Sexual Activity    Alcohol use: No     Frequency: Never    Drug use: No    Sexual activity: Yes     Review of Systems   Constitutional: Negative for activity change, appetite change (resolved decreased appetite), chills, fatigue, fever and unexpected weight change (trying to lose weight through diet since diagnosis ALBARADO).   HENT: Negative for sore throat and trouble swallowing.    Eyes: Negative for visual disturbance.   Respiratory: Negative for cough and shortness of breath.    Cardiovascular: Negative for chest pain.   Gastrointestinal: Negative for abdominal pain (on admission abdominal queezy sensation but no pain), anal bleeding, blood in stool, constipation, diarrhea (resolved, last episode 1/11), nausea and vomiting.   Genitourinary: Negative for dysuria.   Musculoskeletal: Negative for arthralgias and myalgias.   Skin: Negative for rash.   Neurological: Negative for dizziness and light-headedness.   Psychiatric/Behavioral: Negative for agitation and confusion.     Objective:     Vital Signs (Most Recent):  Temp: 98.4 °F (36.9 °C) (01/12/19 1257)  Pulse: 64 (01/12/19 1257)  Resp: 18 (01/12/19 1257)  BP: 126/76 (01/12/19 1257)  SpO2: 99 % (01/12/19 1257) Vital Signs (24h Range):  Temp:  [97.3 °F (36.3 °C)-98.7 °F (37.1 °C)] 98.4 °F (36.9 °C)  Pulse:   [63-77] 64  Resp:  [18] 18  SpO2:  [95 %-99 %] 99 %  BP: (126-138)/(65-80) 126/76     Weight: 122.9 kg (271 lb) (01/10/19 0717)  Body mass index is 40.02 kg/m².      Intake/Output Summary (Last 24 hours) at 1/12/2019 1358  Last data filed at 1/12/2019 0930  Gross per 24 hour   Intake 1640 ml   Output --   Net 1640 ml       Lines/Drains/Airways     Peripheral Intravenous Line                 Peripheral IV - Single Lumen 01/10/19 1705 Right Forearm 1 day                Physical Exam   Constitutional: He is oriented to person, place, and time. He appears well-developed and well-nourished. No distress.   Well appearing middle aged man, no distress. Sitting in chair.     HENT:   Head: Normocephalic and atraumatic.   Mouth/Throat: Oropharynx is clear and moist. No oropharyngeal exudate.   Eyes: Conjunctivae are normal. No scleral icterus.   Cardiovascular: Normal rate, regular rhythm and normal heart sounds.   Pulmonary/Chest: Effort normal and breath sounds normal. No respiratory distress.   Abdominal: Soft. Bowel sounds are normal. He exhibits no distension and no mass. There is no tenderness. There is no rebound and no guarding.   Obese abdomen, soft non-tender. Rectal without palpable masses, light brown stool.   Musculoskeletal: He exhibits no edema or tenderness.   Lymphadenopathy:     He has no cervical adenopathy.   Neurological: He is alert and oriented to person, place, and time.   Skin: Skin is warm. Capillary refill takes less than 2 seconds. He is not diaphoretic.   Psychiatric: He has a normal mood and affect. His behavior is normal. Judgment and thought content normal.   Nursing note and vitals reviewed.      Significant Labs:  CBC:   Recent Labs   Lab 01/11/19  0410 01/11/19  1847 01/12/19  0503 01/12/19  1058   WBC 13.18*  --  10.56 9.35   HGB 15.1 12.7* 12.7* 13.4*   HCT 46.0  --  37.4* 41.3     --  SEE COMMENT 143*     CMP:   Recent Labs   Lab 01/12/19  0503   GLU 82   CALCIUM 9.6   ALBUMIN 2.7*    PROT 5.8*      K 3.6   CO2 20*   *   BUN 12   CREATININE 0.8   ALKPHOS 61   ALT 19   AST 17   BILITOT 0.8     Coagulation: No results for input(s): PT, INR, APTT in the last 48 hours.    Lab Results   Component Value Date    LIPASE 567 (H) 01/08/2019     Lab Results   Component Value Date    AMYLASE 403 (H) 01/08/2019       Significant Imaging:  Imaging results within the past 24 hours have been reviewed.

## 2019-01-12 NOTE — ASSESSMENT & PLAN NOTE
- acute onset over 1 day, watery  - stool studies, including C. Diff, negative  - likely 2/2 acute viral gastroenteritis  - initiated loperamide and IV antiemetics: zofran and phenergan to alternate  - CT a/p without any evidence of colitis  - developed melena on 1/11 which resolved on its own: GI consulted and plan for outpatient EGD/cscopce

## 2019-01-12 NOTE — ASSESSMENT & PLAN NOTE
Mr Funez is a 58 year old man with history of RA on prednisone and embrel (started 12/20), latent syphilis, HTN, HepC who presented to the hospital on 1/8 due to diarrhea; GI consulted due to concern for drop in H&H and concern for melena.    On presentation his labs notable for likely hemoconcentration with hgb of 15.6 with baseline hgb 13-14 in setting of dehydration form nausea, vomiting, diarrhea and decreased PO intake. His H&H was trended and decreased to 12.7 which has improved on 1/12 to 13.4 (within baseline). No clear history of GI bleed though he reports ?dark emesis and ?dark bowel movements - but with brown stool on MARYELLEN, hemodynamic stability, and normal BUN. Given history of vomitus and ?retching he would be at risk for greg-neal tear, or the elevated lipase could reflect a duodenal ulcer.    Unclear the cause of the elevated lipase. Unlikely acute pancreatitis given negative abdominal CT and no history of abdominal pain. Lipase can be elevated in other causes, and given ?GI bleed could be secondary to a duodenal ulcer, or acute gastroenteritis. He recently started embrel though not noted to cause pancreatitis/elevated lipase. He is on HCTZ which can cause pancreatitis (though as above no clear history of pancreatitis).    Likely acute episode of nausea, vomiting and diarrhea were secondary to a viral illness given transient episode, self-resolving, and negative hospital workup. Currently resolved and tolerating diet.    Plan  - given stable-improving H&H with negative MARYELLEN plan for outpatient EGD  - unclear history of c-scope (endorses history of colonoscopy in 2016 requiring 3 yr follow-up). Will plan for outpatient colonoscopy  - protonix 40mg BID on discharge  - ALBARADO, followed by hepatology clinic. F3 on fibroscan. Continue outpatient follow-up.   - will plan for clinic follow-up (will request)  - will review lipase and imaging and may warrant outpatient EUS  - plan discussed with primary  team

## 2019-01-12 NOTE — PLAN OF CARE
Problem: Adult Inpatient Plan of Care  Goal: Plan of Care Review  Outcome: Ongoing (interventions implemented as appropriate)  Pt verbalize understanding of on going plan of care.

## 2019-01-12 NOTE — HPI
"Mr Funez is a 58 year old man with history of RA on prednisone and embrel (started 12/20), latent syphilis, HTN, HepC who presented to the hospital on 1/8 due to diarrhea; GI consulted due to concern for drop in H&H and an concern for melena.    Per H&P he presented on 1/8 with 1 day of nausea and diarrhea (watery, non-bloody, non-melenic). Was also noted to have vomitus. Denied abdominal pain on admission. Labs on admission notable for normal CBC (hgb 15.6, baseline 13.5 8/2018) which trended to 15.4 -> 16.7 -> 15.1 -> 12.7 -> . BMP unremarkable. LFT normal. Lipase elevated (567) on admission. Stool studies were sent with negative c.diff, stool culture. CT AP (1/8) with unremarkable pancreas, stomach, SI/LI and liver.    He reports that he started embrel ~12/2018 and ~1 week thereafter he developed a viral illness and subsequently his girlfriend developed the flu (recently started on antibiotics for persistent symptoms) therefore did not take the next dose. He was feeling well until ~2-3 days prior to admission when he noted that after eating yogurt in the evening he started to develop a queezy sensation in his abdomen. Over the next day he developped frequent diarrheal episodes (watery, non-bloody but ?dark) for which he presented to the hospital for evaluation. He notes that he felt nauseated but without vomiting until he was admitted when he started to have several episodes of vomitus (non-bloody, ?an episode of dark emesis).    Since admission he was managed conservatively with improvement of symptoms and was able to advance diet.He had an episode of synocpe on 1/9 - 1/10 while on bathroom "reports that while he was on the toilet, he passed loose stool, felt progressively weaker, dizzy and so called the nurse" which was felt to be secondary to dehydration for which he was given IV hydration.     GI consulted due to concern for decline in H&H. On admission H&H 15.6 which up trended to 16.7 and declined to " 12.7 on 1/11 -> 12.7 -> 13.4 (1/12). He denies any history of Gi bleed. Denies any prior EGD, but notes colonoscopy was 2016 with 1-2 polyps with 3 year follow-up (in [place]). He notes that right now he feels back to baseline, with return of his appetite, resolution of his diarrhea (last BM yesterday 1/11 AM), nausea improving and no additional vomitus. Review otherwise as below.    Rx Review  - protonix 40mg daily, changed to BID on 1/12 [omeprazole 20mg daily home medication]  - lovenox 40mg daily (last dose 1/11)  - prednisone 10mg daily [home medication]  - loperamide 2mg QID PRN (last given 1/11 x1 dose)  - zofran 8mg q6h IV (not given)  - phenegren 12.5mg q6h (last dosed 1/10)  - denies any NSAID. No smoking. No ETOH (quit 12/2018 with diagnosis of ALBARADO)

## 2019-01-12 NOTE — ASSESSMENT & PLAN NOTE
- unlikely to be pancreatitis; does have elevated lipase/amylase but no abdominal pain or evidence of inflammation on CT  - recent abdominal US w/o any gallstones; also not visualized on CT, will defer repeat imaging for now  - does have elevated Ca (see below), which can cause pancreatitis, but would be very unusual presentation  - could be 2/2 diarrhea/GI source; less likely malignancy given lack of any other symptoms (albiet with hyperCa)  - GI consulted: can consider outpatient EUS to r/o pancreatic pathology

## 2019-01-12 NOTE — ASSESSMENT & PLAN NOTE
- syncopized on 1/9, likely due to dehydration and vagal stimulation in setting of toilet use  - cardiac workup negative, including unremarkable EKG and TTE  - s/p bolus NS; resolution of symptoms

## 2019-01-12 NOTE — HOSPITAL COURSE
Mr. Funez presented with intractable nausea, vomiting, and diarrhea 2/2 suspected viral gastroenteritis. Also with elevated lipase on admission, although no other signs/symptoms of pancreatitis and his CT scan was negative for pancreatic inflammation. Hospital course complicated by episode of syncope, likely 2/2 dehydration. He also developed melena x1 day; Hb was monitored and this resolved quickly. GI evaluated while in house and will follow up with outpatient EGD/cscope and possibly EUS to further evaluate cause of lipase elevation.    Vitals:    01/12/19 1616   BP: 121/64   Pulse: 63   Resp: 18   Temp: 98.2 °F (36.8 °C)     Physical Exam   Constitutional: He is oriented to person, place, and time. He appears well-developed and well-nourished. No distress.   obese   HENT:   Head: Normocephalic and atraumatic.   Nose: Nose normal.   Eyes: EOM are normal. Pupils are equal, round, and reactive to light. No scleral icterus.   Neck: Normal range of motion. No JVD present. No tracheal deviation present.   Cardiovascular: Normal rate, regular rhythm and normal heart sounds. Exam reveals no gallop and no friction rub.   No murmur heard.  Pulmonary/Chest: Effort normal and breath sounds normal. No respiratory distress.   Abdominal: Soft. He exhibits no distension and no mass. There is no tenderness. No hernia.   Musculoskeletal: He exhibits no edema or deformity.   Neurological: He is alert and oriented to person, place, and time.   Skin: Skin is warm and dry. No rash noted. He is not diaphoretic.   Psychiatric: He has a normal mood and affect. His behavior is normal.   Vitals reviewed.

## 2019-01-14 ENCOUNTER — CLINICAL SUPPORT (OUTPATIENT)
Dept: INFECTIOUS DISEASES | Facility: CLINIC | Age: 59
End: 2019-01-14
Payer: MEDICARE

## 2019-01-14 DIAGNOSIS — D84.9 IMMUNOCOMPROMISED, ACQUIRED: ICD-10-CM

## 2019-01-14 DIAGNOSIS — M05.79 RHEUMATOID ARTHRITIS INVOLVING MULTIPLE SITES WITH POSITIVE RHEUMATOID FACTOR: ICD-10-CM

## 2019-01-14 PROCEDURE — 90739 HEPB VACC 2/4 DOSE ADULT IM: CPT | Mod: PBBFAC

## 2019-01-14 PROCEDURE — 99211 OFF/OP EST MAY X REQ PHY/QHP: CPT | Mod: PBBFAC

## 2019-01-14 PROCEDURE — 99999 PR PBB SHADOW E&M-EST. PATIENT-LVL I: ICD-10-PCS | Mod: PBBFAC,,,

## 2019-01-14 PROCEDURE — 99999 PR PBB SHADOW E&M-EST. PATIENT-LVL I: CPT | Mod: PBBFAC,,,

## 2019-01-15 ENCOUNTER — TELEPHONE (OUTPATIENT)
Dept: GASTROENTEROLOGY | Facility: CLINIC | Age: 59
End: 2019-01-15

## 2019-01-15 ENCOUNTER — PATIENT MESSAGE (OUTPATIENT)
Dept: GASTROENTEROLOGY | Facility: CLINIC | Age: 59
End: 2019-01-15

## 2019-01-15 ENCOUNTER — OFFICE VISIT (OUTPATIENT)
Dept: INTERNAL MEDICINE | Facility: CLINIC | Age: 59
End: 2019-01-15
Payer: MEDICARE

## 2019-01-15 VITALS
TEMPERATURE: 99 F | DIASTOLIC BLOOD PRESSURE: 70 MMHG | SYSTOLIC BLOOD PRESSURE: 130 MMHG | OXYGEN SATURATION: 98 % | BODY MASS INDEX: 40.29 KG/M2 | HEIGHT: 69 IN | HEART RATE: 64 BPM | WEIGHT: 272 LBS

## 2019-01-15 DIAGNOSIS — G47.00 INSOMNIA, UNSPECIFIED TYPE: ICD-10-CM

## 2019-01-15 DIAGNOSIS — G57.11 MERALGIA PARESTHETICA OF RIGHT SIDE: Primary | ICD-10-CM

## 2019-01-15 DIAGNOSIS — K59.00 CONSTIPATION, UNSPECIFIED CONSTIPATION TYPE: ICD-10-CM

## 2019-01-15 DIAGNOSIS — M05.79 RHEUMATOID ARTHRITIS INVOLVING MULTIPLE SITES WITH POSITIVE RHEUMATOID FACTOR: ICD-10-CM

## 2019-01-15 DIAGNOSIS — E55.9 VITAMIN D DEFICIENCY: ICD-10-CM

## 2019-01-15 DIAGNOSIS — K92.1 MELENA: ICD-10-CM

## 2019-01-15 DIAGNOSIS — L73.9 FOLLICULITIS: ICD-10-CM

## 2019-01-15 PROCEDURE — 99214 OFFICE O/P EST MOD 30 MIN: CPT | Mod: PBBFAC | Performed by: FAMILY MEDICINE

## 2019-01-15 PROCEDURE — 99999 PR PBB SHADOW E&M-EST. PATIENT-LVL IV: ICD-10-PCS | Mod: PBBFAC,,, | Performed by: FAMILY MEDICINE

## 2019-01-15 PROCEDURE — 99213 PR OFFICE/OUTPT VISIT, EST, LEVL III, 20-29 MIN: ICD-10-PCS | Mod: S$PBB,,, | Performed by: FAMILY MEDICINE

## 2019-01-15 PROCEDURE — 99999 PR PBB SHADOW E&M-EST. PATIENT-LVL IV: CPT | Mod: PBBFAC,,, | Performed by: FAMILY MEDICINE

## 2019-01-15 PROCEDURE — 99213 OFFICE O/P EST LOW 20 MIN: CPT | Mod: S$PBB,,, | Performed by: FAMILY MEDICINE

## 2019-01-15 RX ORDER — VIT C/E/ZN/COPPR/LUTEIN/ZEAXAN 250MG-90MG
1000 CAPSULE ORAL DAILY
Qty: 30 CAPSULE | Refills: 11 | Status: SHIPPED | OUTPATIENT
Start: 2019-01-15

## 2019-01-15 RX ORDER — DOCUSATE SODIUM 100 MG/1
100 CAPSULE, LIQUID FILLED ORAL 2 TIMES DAILY
Qty: 60 CAPSULE | Refills: 0 | Status: SHIPPED | OUTPATIENT
Start: 2019-01-15

## 2019-01-15 RX ORDER — GABAPENTIN 100 MG/1
100 CAPSULE ORAL NIGHTLY PRN
Qty: 30 CAPSULE | Refills: 2 | Status: SHIPPED | OUTPATIENT
Start: 2019-01-15 | End: 2020-01-15

## 2019-01-15 RX ORDER — TETANUS TOXOID, REDUCED DIPHTHERIA TOXOID AND ACELLULAR PERTUSSIS VACCINE, ADSORBED 5; 2.5; 8; 8; 2.5 [IU]/.5ML; [IU]/.5ML; UG/.5ML; UG/.5ML; UG/.5ML
SUSPENSION INTRAMUSCULAR
COMMUNITY
Start: 2018-12-12

## 2019-01-15 RX ORDER — MUPIROCIN CALCIUM 20 MG/G
CREAM TOPICAL 2 TIMES DAILY
Qty: 30 G | Refills: 0 | Status: SHIPPED | OUTPATIENT
Start: 2019-01-15

## 2019-01-15 NOTE — PROGRESS NOTES
Subjective:      Patient ID: Massimo Funez is a 58 y.o. male.    Chief Complaint: Follow-up      HPI:  Massimo Funez is a 58 year old male with chronic hepatitis C infection, history of latent syphilis--treated, hypertension, hyperlipidemia, obesity, rheumatoid arthritis, osteoarthritis, nephrolithiasis, and bilateral knee pain who presents today for hospital follow up.    Hospitalized 1/8/19 - 1/12/19 2/2 nausea and diarrhea.  Mr. Funez presented with intractable nausea, vomiting, and diarrhea 2/2 suspected viral gastroenteritis. Also with elevated lipase on admission, although no other signs/symptoms of pancreatitis and his CT scan was negative for pancreatic inflammation. Hospital course complicated by episode of syncope, likely 2/2 dehydration. He also developed melena x1 day; Hb was monitored and this resolved quickly. GI evaluated while in house and will follow up with outpatient EGD/cscope and possibly EUS to further evaluate cause of lipase elevation.  Has not had a bowel movement in two days.    Started on Enbrel 50 mg subq once weekly 11/29/18 per rheumatology.  Took one dose then had cold sympoms so discontinued.  States his girl friend developed the flu so he held off on it.  Then was hospitalized with gastroenteritis. Still on prednisone.  Was to follow up with rhuematology 2 months after that visit.  Referred to hepatology for elevated transaminitis.  Noted to have history of positive Hep C Ab but no viremia at this time.  Noted to have fibrosis and steatosis on fibroscan.    Complains of tingling to right leg.  Worse at night.  Anterolateral aspect of the right thigh from hip to knee.  Intermittent issue in the past, constant now after hospitalization.  Does have low back pain made worse with standing but not necessarily related to his thigh tingling.  Denies associated weakness to the right leg.      Eating more vegetables.  Uses extra virgin olive oil.  Mainly eating chicken and  fish.    Endorses difficulty getting to sleep.  Not taking any medications for this currently.    Endorses some lesions to his buttocks.  Noticed while in the hospital.  Denies associated itching or pain.  Does not know if lesions have changed or not.      Past Medical History:   Diagnosis Date    Hyperlipidemia     Hypertension     Iron deficiency anemia due to chronic blood loss     Nephrolithiasis 1/8/2019    Rheumatoid arthritis     Rheumatoid arthritis involving multiple sites with positive rheumatoid factor 4/16/2018    Syphilis        Past Surgical History:   Procedure Laterality Date    LIVER BIOPSY         Family History   Problem Relation Age of Onset    Diabetes Mellitus Mother     Inflammatory bowel disease Mother     Osteoarthritis Mother     Hypertension Father     Stroke Father     Cancer Neg Hx     Heart disease Neg Hx     Hyperlipidemia Neg Hx     Lupus Neg Hx     Kidney disease Neg Hx     Thyroid disease Neg Hx     Rheum arthritis Neg Hx     Psoriasis Neg Hx     Cirrhosis Neg Hx        Social History     Socioeconomic History    Marital status:      Spouse name: None    Number of children: None    Years of education: None    Highest education level: None   Social Needs    Financial resource strain: None    Food insecurity - worry: None    Food insecurity - inability: None    Transportation needs - medical: None    Transportation needs - non-medical: None   Occupational History    None   Tobacco Use    Smoking status: Former Smoker    Smokeless tobacco: Never Used   Substance and Sexual Activity    Alcohol use: No     Frequency: Never    Drug use: No    Sexual activity: Yes   Other Topics Concern    None   Social History Narrative    None       Review of Systems   Constitutional: Negative for activity change, chills, fatigue, fever and unexpected weight change.   HENT: Negative for congestion, hearing loss, nosebleeds, rhinorrhea, sore throat and  "trouble swallowing.    Eyes: Negative for pain, discharge and visual disturbance.   Respiratory: Negative for cough, chest tightness, shortness of breath and wheezing.    Cardiovascular: Negative for chest pain and palpitations.   Gastrointestinal: Positive for constipation. Negative for abdominal distention, abdominal pain, blood in stool, diarrhea, nausea and vomiting.   Endocrine: Negative for polydipsia and polyuria.   Genitourinary: Negative for difficulty urinating, dysuria, frequency, hematuria and urgency.   Musculoskeletal: Negative for arthralgias, back pain, joint swelling, myalgias and neck pain.   Skin: Positive for rash. Negative for color change.   Neurological: Positive for numbness. Negative for dizziness, syncope, speech difficulty, weakness and headaches.   Psychiatric/Behavioral: Positive for sleep disturbance. Negative for agitation, behavioral problems, confusion and dysphoric mood. The patient is not nervous/anxious.      Objective:     Vitals:    01/15/19 1120   BP: 130/70   BP Location: Left arm   Patient Position: Sitting   BP Method: Large (Manual)   Pulse: 64   Temp: 98.7 °F (37.1 °C)   TempSrc: Oral   SpO2: 98%   Weight: 123.4 kg (272 lb)   Height: 5' 9" (1.753 m)       Physical Exam   Constitutional: He appears well-developed and well-nourished. He is cooperative. No distress.   HENT:   Head: Normocephalic and atraumatic.   Right Ear: Hearing and external ear normal.   Left Ear: Hearing and external ear normal.   Nose: Nose normal. No rhinorrhea. No epistaxis.   Mouth/Throat: Oropharynx is clear and moist and mucous membranes are normal. No oral lesions.   Eyes: Conjunctivae, EOM and lids are normal. Pupils are equal, round, and reactive to light. Right eye exhibits no discharge. Left eye exhibits no discharge.   Neck: Trachea normal and normal range of motion. Neck supple. No tracheal deviation present.   Cardiovascular: Normal rate, regular rhythm and normal heart sounds. Exam reveals " no gallop and no friction rub.   No murmur heard.  Pulmonary/Chest: Effort normal and breath sounds normal. No respiratory distress. He has no wheezes. He has no rales.   Abdominal: Soft. Bowel sounds are normal. He exhibits no distension. There is no tenderness. There is no rebound and no guarding.   Musculoskeletal: Normal range of motion. He exhibits no edema or deformity.   Neurological: He is alert. No cranial nerve deficit. He exhibits normal muscle tone.   Skin: Skin is warm and dry. No rash noted.        Psychiatric: He has a normal mood and affect. His speech is normal and behavior is normal. Judgment and thought content normal. Cognition and memory are normal.   Nursing note and vitals reviewed.     Assessment:      1. Meralgia paresthetica of right side    2. Folliculitis    3. Vitamin D deficiency    4. Constipation, unspecified constipation type    5. Melena    6. Rheumatoid arthritis involving multiple sites with positive rheumatoid factor    7. Insomnia, unspecified type      Plan:   Massimo was seen today for follow-up.    Diagnoses and all orders for this visit:    Meralgia paresthetica of right side  -     Start gabapentin (NEURONTIN) 100 MG capsule; Take 1 capsule (100 mg total) by mouth nightly as needed (numbness and tingling).  Return to clinic if no improvement.  Weight loss.    Folliculitis  -     Start mupirocin calcium 2% (BACTROBAN) 2 % cream; Apply topically 2 (two) times daily.      Vitamin D deficiency  -     States ergocalciferol was not covered.  Start cholecalciferol, vitamin D3, (VITAMIN D3) 1,000 unit capsule; Take 1 capsule (1,000 Units total) by mouth once daily.    Constipation, unspecified constipation type  -     docusate sodium (COLACE) 100 MG capsule; Take 1 capsule (100 mg total) by mouth 2 (two) times daily.  Can use Miralax if no improvement.    Melena        -     Follow up with GI for colonoscopy/EGD    Rheumatoid arthritis involving multiple sites with positive  rheumatoid factor        -     Follow up with rheumatology concerning restarting Enbrel.    Insomnia, unspecified type        -     Recommended OTC melatonin PRN.  Return to clinic if no improvement.

## 2019-01-15 NOTE — TELEPHONE ENCOUNTER
----- Message from Kevin Herrera MD sent at 1/12/2019  4:22 PM CST -----  Jose L Hernandez, can we please schedule Mr Funez for a clinic follow-up appointment. Thanks,Sha

## 2019-01-17 ENCOUNTER — PATIENT MESSAGE (OUTPATIENT)
Dept: RHEUMATOLOGY | Facility: CLINIC | Age: 59
End: 2019-01-17

## 2019-01-28 ENCOUNTER — TELEPHONE (OUTPATIENT)
Dept: PHARMACY | Facility: CLINIC | Age: 59
End: 2019-01-28

## 2019-01-29 ENCOUNTER — PATIENT MESSAGE (OUTPATIENT)
Dept: INTERNAL MEDICINE | Facility: CLINIC | Age: 59
End: 2019-01-29

## 2019-01-29 RX ORDER — OMEPRAZOLE 20 MG/1
20 CAPSULE, DELAYED RELEASE ORAL 2 TIMES DAILY
Qty: 180 CAPSULE | Refills: 3 | Status: SHIPPED | OUTPATIENT
Start: 2019-01-29 | End: 2019-01-31

## 2019-01-29 NOTE — TELEPHONE ENCOUNTER
Please contact patient.  Rx listed in chart states he takes omeprazole 20 mg 2 tabs twice daily.  Does patient take 1 tab twice daily or 2 tabs twice daily?

## 2019-01-30 ENCOUNTER — TELEPHONE (OUTPATIENT)
Dept: INTERNAL MEDICINE | Facility: CLINIC | Age: 59
End: 2019-01-30

## 2019-01-30 NOTE — TELEPHONE ENCOUNTER
----- Message from Regina Bedoya sent at 1/30/2019  2:23 PM CST -----  Contact: [facility]  Prior Authorization Needed    Medication: omeprazole (PRILOSEC) 20 MG capsule    Pharmacy Info: [id number] - [id number] - [address] does not cover this medication. Please call plan at [phone] to initiate prior authorization or call/fax pharmacy to change medication. Patient ID#[id number] Group [id number]    Note chart when prior authorization has been submitted.    Please notify pharmacy when prior authorization has been approved.    Thank You

## 2019-01-31 RX ORDER — OMEPRAZOLE 40 MG/1
40 CAPSULE, DELAYED RELEASE ORAL DAILY
Qty: 30 CAPSULE | Refills: 11 | Status: SHIPPED | OUTPATIENT
Start: 2019-01-31 | End: 2020-01-31

## 2019-02-04 ENCOUNTER — TELEPHONE (OUTPATIENT)
Dept: RHEUMATOLOGY | Facility: CLINIC | Age: 59
End: 2019-02-04

## 2019-02-04 ENCOUNTER — PATIENT MESSAGE (OUTPATIENT)
Dept: RHEUMATOLOGY | Facility: CLINIC | Age: 59
End: 2019-02-04

## 2019-02-05 ENCOUNTER — TELEPHONE (OUTPATIENT)
Dept: INTERNAL MEDICINE | Facility: CLINIC | Age: 59
End: 2019-02-05

## 2019-02-05 NOTE — TELEPHONE ENCOUNTER
----- Message from Pat Baker sent at 2/5/2019  3:52 PM CST -----  Prescription Clarification:     The pharmacy needs clarity on the following Rx:    omeprazole (PRILOSEC) 40 MG capsule    Pharmacy: [address] ([address]), [place] - [address]    Thank You

## 2019-02-05 NOTE — TELEPHONE ENCOUNTER
Called patient's pharmacy.  Pharmacy states it looks like the issue was already addressed previously.

## 2019-02-18 ENCOUNTER — TELEPHONE (OUTPATIENT)
Dept: PHARMACY | Facility: CLINIC | Age: 59
End: 2019-02-18

## 2019-02-26 ENCOUNTER — OFFICE VISIT (OUTPATIENT)
Dept: RHEUMATOLOGY | Facility: CLINIC | Age: 59
End: 2019-02-26
Payer: MEDICARE

## 2019-02-26 VITALS
SYSTOLIC BLOOD PRESSURE: 151 MMHG | DIASTOLIC BLOOD PRESSURE: 92 MMHG | HEART RATE: 102 BPM | WEIGHT: 252.44 LBS | HEIGHT: 69 IN | BODY MASS INDEX: 37.39 KG/M2

## 2019-02-26 DIAGNOSIS — M06.9 RHEUMATOID ARTHRITIS INVOLVING MULTIPLE JOINTS: Primary | ICD-10-CM

## 2019-02-26 DIAGNOSIS — K75.81 NASH (NONALCOHOLIC STEATOHEPATITIS): ICD-10-CM

## 2019-02-26 DIAGNOSIS — R21 RASH: ICD-10-CM

## 2019-02-26 PROCEDURE — 99999 PR PBB SHADOW E&M-EST. PATIENT-LVL IV: ICD-10-PCS | Mod: PBBFAC,,, | Performed by: INTERNAL MEDICINE

## 2019-02-26 PROCEDURE — 99214 OFFICE O/P EST MOD 30 MIN: CPT | Mod: PBBFAC | Performed by: INTERNAL MEDICINE

## 2019-02-26 PROCEDURE — 99215 OFFICE O/P EST HI 40 MIN: CPT | Mod: S$PBB,,, | Performed by: INTERNAL MEDICINE

## 2019-02-26 PROCEDURE — 99999 PR PBB SHADOW E&M-EST. PATIENT-LVL IV: CPT | Mod: PBBFAC,,, | Performed by: INTERNAL MEDICINE

## 2019-02-26 PROCEDURE — 99215 PR OFFICE/OUTPT VISIT, EST, LEVL V, 40-54 MIN: ICD-10-PCS | Mod: S$PBB,,, | Performed by: INTERNAL MEDICINE

## 2019-02-26 RX ORDER — HYDROXYCHLOROQUINE SULFATE 200 MG/1
200 TABLET, FILM COATED ORAL 2 TIMES DAILY
Qty: 60 TABLET | Refills: 6 | Status: SHIPPED | OUTPATIENT
Start: 2019-02-26 | End: 2019-04-26 | Stop reason: SDUPTHER

## 2019-02-26 RX ORDER — PREDNISONE 5 MG/1
TABLET ORAL
COMMUNITY
Start: 2019-01-27 | End: 2019-02-26

## 2019-02-26 RX ORDER — PREDNISONE 2.5 MG/1
7.5 TABLET ORAL DAILY
Qty: 90 TABLET | Refills: 2 | Status: SHIPPED | OUTPATIENT
Start: 2019-02-26 | End: 2019-03-28

## 2019-02-26 ASSESSMENT — ROUTINE ASSESSMENT OF PATIENT INDEX DATA (RAPID3)
PATIENT GLOBAL ASSESSMENT SCORE: 1
MDHAQ FUNCTION SCORE: .6
AM STIFFNESS SCORE: 0, NO
FATIGUE SCORE: 0
PAIN SCORE: .5
TOTAL RAPID3 SCORE: 1.17
PSYCHOLOGICAL DISTRESS SCORE: 0

## 2019-02-26 NOTE — PROGRESS NOTES
Subjective:       Patient ID: Massimo Funez is a 58 y.o. male.    Chief Complaint: No chief complaint on file.    HPI 58 year old with PMH of HTN, HL, hepatitis C ( diagnosed in 2002 never required treatment), latent syphilis, RA (diagnosed in 2002), ALBARADO here for evaluation. In 2002, he developed pain and swelling of both knees.  Then it went into hands, shoulders, and wrists. He was started on plaquenil and prednisone initially and was then put on MTX. He has been off and on MTX. He was taking MTX  6 pills a week and then it stopped due to elevated LFTS in august. Reports he is taking prednisone 10mg a day.  He reports without prednisone, he is not able to function. He reports lower back pain in last year. Pain level in lower back can be as high as 4/10. ]  Pain level today is 2/10. He reports on occasion having left ankle swelling and left foot swelling. Denies swelling.  Denies oral ulcers, fevers, oral ulcers, raynauds, or pleurisy.        Interval history: He gave himself one shot of enbrel. He had recent admission for gastroenteritis.  He is trying to lose weight and eat healthy. He continues on prednisone 10mg a day.  Denies any pain or swelling while on prednisone 10mg a day.          Past Medical History:   Diagnosis Date    Hyperlipidemia     Hypertension     Rheumatoid arthritis involving multiple sites with positive rheumatoid factor 4/16/2018       Review of Systems   Constitutional: Negative for activity change, appetite change, chills, diaphoresis, fatigue and fever.   HENT: Negative for ear discharge, ear pain, hearing loss, mouth sores, nosebleeds and sinus pressure.    Eyes: Negative.  Negative for photophobia, pain, discharge, redness and itching.   Respiratory: Negative for cough, chest tightness and shortness of breath.    Cardiovascular: Negative for chest pain, palpitations and leg swelling.   Gastrointestinal: Negative for abdominal distention, blood in stool and nausea.  "  Endocrine: Negative for cold intolerance, heat intolerance, polydipsia and polyphagia.   Genitourinary: Negative for flank pain, genital sores and hematuria.   Musculoskeletal: Positive for arthralgias and joint swelling. Negative for back pain, gait problem, myalgias, neck pain and neck stiffness.   Skin: Negative for color change, pallor and rash.   Neurological: Negative for dizziness, weakness, light-headedness and headaches.   Hematological: Negative for adenopathy. Does not bruise/bleed easily.   Psychiatric/Behavioral: Negative for decreased concentration and hallucinations. The patient is not nervous/anxious.              Objective:   /89   Pulse 63   Ht 5' 8" (1.727 m)   BMI 41.66 kg/m²      Physical Exam   Constitutional: He is oriented to person, place, and time and well-developed, well-nourished, and in no distress. No distress.   HENT:   Head: Normocephalic and atraumatic.   Right Ear: External ear normal.   Left Ear: External ear normal.   Eyes: Conjunctivae are normal. Pupils are equal, round, and reactive to light.   Neck: Normal range of motion. Neck supple. No JVD present. No tracheal deviation present. No thyromegaly present.   Cardiovascular: Normal rate, regular rhythm, normal heart sounds and intact distal pulses.  Exam reveals no gallop and no friction rub.    No murmur heard.  Pulmonary/Chest: Effort normal and breath sounds normal. No stridor. No respiratory distress. He has no wheezes. He has no rales. He exhibits no tenderness.   Abdominal: Soft. Bowel sounds are normal. He exhibits no distension and no mass. There is no tenderness. There is no rebound and no guarding.   Lymphadenopathy:     He has no cervical adenopathy.   Neurological: He is alert and oriented to person, place, and time. No cranial nerve deficit. Gait normal. Coordination normal.   Skin: Skin is warm and dry. No rash noted. He is not diaphoretic. No erythema. No pallor.     Musculoskeletal: Normal range of " motion. He exhibits no edema, tenderness or deformity.   FROM of all joints including neck, shoulders, spine, ankles, wrists, knees, and ankles; no joint deformities noted or effusions, erythema or warmth; no tophi or nodules noted; no crepitus; no synovitis noted in hands or feet; no nail pitting or onycholysis                Labs: reviewed  ccp-21  rf-37  Mariana-+      Arthritis survey (2018): I personally reviewed;  Erosive or inflammatory arthritis is not identified. There is moderate osteoarthritis at both knees and mild arthritis noted at the mid tarsal joints and at the carpal joints of both wrists. Spurs and osteophytes are noted in the cervical spine especially at C4-5 anteriorly.    A/p:58 year old with PMH of HTN, HL, hepatitis C ( diagnosed in 2002 never required treatment), latent syphilis, RA (diagnosed in 2002), ALBARADO here for evaluation.  Patient previously followed by Dr. Fox.  Was on MTX but was stopped due to transaminitis.  Patient currently on prednisone 10mg a day in order for him to function.   He took one injection of enbrel but then developed viral GI illness. He reports that with losing weight, he has less pain and swelling so will try plaquenil.   He has very dry skin and would like to see dermatologist.      Start plaquenil 200mg po BID (Risks of starting plaquenil discussed. Risks include eye toxicity and agrees on timely follow up with optho to avoid risks of eye toxicity. Other risks include rashes such has hyperpigmentation and vertigo.)  After a month of being on plaquenil, then decrease prednisone from 10mg  A day to 7.5mg a day  Dermatology consult   Encourage weight loss  Dermatology consult  Encouraged smoking cessation     2) ALBARADO: encourage weight loss  Follow up with hepatology      *

## 2019-03-25 ENCOUNTER — OFFICE VISIT (OUTPATIENT)
Dept: GASTROENTEROLOGY | Facility: CLINIC | Age: 59
End: 2019-03-25
Payer: MEDICARE

## 2019-03-25 ENCOUNTER — LAB VISIT (OUTPATIENT)
Dept: LAB | Facility: HOSPITAL | Age: 59
End: 2019-03-25
Payer: MEDICARE

## 2019-03-25 VITALS
DIASTOLIC BLOOD PRESSURE: 84 MMHG | HEART RATE: 77 BPM | HEIGHT: 69 IN | BODY MASS INDEX: 35 KG/M2 | WEIGHT: 236.31 LBS | SYSTOLIC BLOOD PRESSURE: 131 MMHG

## 2019-03-25 DIAGNOSIS — R74.8 ELEVATED LIPASE: ICD-10-CM

## 2019-03-25 DIAGNOSIS — D64.9 ANEMIA, UNSPECIFIED TYPE: ICD-10-CM

## 2019-03-25 DIAGNOSIS — D64.9 ANEMIA, UNSPECIFIED TYPE: Primary | ICD-10-CM

## 2019-03-25 LAB
BASOPHILS # BLD AUTO: 0.02 K/UL (ref 0–0.2)
BASOPHILS NFR BLD: 0.3 % (ref 0–1.9)
DIFFERENTIAL METHOD: NORMAL
EOSINOPHIL # BLD AUTO: 0 K/UL (ref 0–0.5)
EOSINOPHIL NFR BLD: 0 % (ref 0–8)
ERYTHROCYTE [DISTWIDTH] IN BLOOD BY AUTOMATED COUNT: 13.5 % (ref 11.5–14.5)
HCT VFR BLD AUTO: 44.4 % (ref 40–54)
HGB BLD-MCNC: 14.8 G/DL (ref 14–18)
IMM GRANULOCYTES # BLD AUTO: 0.02 K/UL (ref 0–0.04)
IMM GRANULOCYTES NFR BLD AUTO: 0.3 % (ref 0–0.5)
LIPASE SERPL-CCNC: 46 U/L (ref 4–60)
LYMPHOCYTES # BLD AUTO: 1.6 K/UL (ref 1–4.8)
LYMPHOCYTES NFR BLD: 20.5 % (ref 18–48)
MCH RBC QN AUTO: 30.4 PG (ref 27–31)
MCHC RBC AUTO-ENTMCNC: 33.3 G/DL (ref 32–36)
MCV RBC AUTO: 91 FL (ref 82–98)
MONOCYTES # BLD AUTO: 0.5 K/UL (ref 0.3–1)
MONOCYTES NFR BLD: 5.9 % (ref 4–15)
NEUTROPHILS # BLD AUTO: 5.8 K/UL (ref 1.8–7.7)
NEUTROPHILS NFR BLD: 73 % (ref 38–73)
NRBC BLD-RTO: 0 /100 WBC
PLATELET # BLD AUTO: 205 K/UL (ref 150–350)
PMV BLD AUTO: 11.7 FL (ref 9.2–12.9)
RBC # BLD AUTO: 4.87 M/UL (ref 4.6–6.2)
WBC # BLD AUTO: 7.86 K/UL (ref 3.9–12.7)

## 2019-03-25 PROCEDURE — 36415 COLL VENOUS BLD VENIPUNCTURE: CPT

## 2019-03-25 PROCEDURE — 85025 COMPLETE CBC W/AUTO DIFF WBC: CPT

## 2019-03-25 PROCEDURE — 99213 OFFICE O/P EST LOW 20 MIN: CPT | Mod: S$PBB,GC,, | Performed by: INTERNAL MEDICINE

## 2019-03-25 PROCEDURE — 83690 ASSAY OF LIPASE: CPT

## 2019-03-25 PROCEDURE — 99999 PR PBB SHADOW E&M-EST. PATIENT-LVL IV: CPT | Mod: PBBFAC,GC,, | Performed by: INTERNAL MEDICINE

## 2019-03-25 PROCEDURE — 99213 PR OFFICE/OUTPT VISIT, EST, LEVL III, 20-29 MIN: ICD-10-PCS | Mod: S$PBB,GC,, | Performed by: INTERNAL MEDICINE

## 2019-03-25 PROCEDURE — 99214 OFFICE O/P EST MOD 30 MIN: CPT | Mod: PBBFAC | Performed by: INTERNAL MEDICINE

## 2019-03-25 PROCEDURE — 99999 PR PBB SHADOW E&M-EST. PATIENT-LVL IV: ICD-10-PCS | Mod: PBBFAC,GC,, | Performed by: INTERNAL MEDICINE

## 2019-03-25 NOTE — PROGRESS NOTES
[facility]    Reason for visit: The primary encounter diagnosis was Anemia, unspecified type. A diagnosis of Elevated lipase was also pertinent to this visit.  Referring Provider/PCP: Corey Kwok MD    History of Present Illness:  Massimo Funez is a 58 y.o. male with a history of RA on prednisone (taper) and plaquenil, latent syphilis, HTN, HepC w F3 fibrosis who is presenting for post-hospitalization follow-up of anemia and elevated lipase.    He was hospitalized 1/8 - 1/12/19 due to 1 day of nausea, vomiting and diarrhea. Labs on admission were notable for Hgb 15.6 (baseline 13), normal LFT, normal BMP, lipase 567. Normal Tg. RUQ (12/12) with hepatomegaly, otherwise unremarkable. CTAP (1/8) with unremarkable pancreas. While hospitalized H&H was noted to decline to 12.7 therefore GI was consulted. As H&H was improving and negative MARYELLEN was recommended for outpatient GI follow-up.    He reports that he has been well since leaving the hospital. He denies any further nausea, vomiting or diarrhea. Denies any episodes of abdominal pain or prior history of symptoms consistent with biliary colic. Denies any hematemesis or coffee-grind emesis. Denies any melena or hematochezia. Denies any history of pancreatitis or family history of pancreatitis/pancreatic cancer. Denies any prior history of EGD/Colonoscopy.    Endorses remote ETOH history, but none within several years. Endorses smoking until November 2018. Denies any drug use. Previously employed as  but currently on disability.    PEndoHx:  - Endorses prior colonoscopy 2015 with ?2 polyps. No records available.    Review of Systems:   Constitutional: no fever, chills or change in weight   Eyes: no visual changes   ENT: no sore throat or dysphagia  Respiratory: no cough or shortness of breath   Cardiovascular: no chest pain or palpitations   Gastrointestinal: as per HPI  Hematologic/Lymphatic: no easy bruising or lymphadenopathy    Musculoskeletal: no arthralgias or myalgias   Neurological: no change in mental status  Behavioral/Psych: no change in mood    Medical History:  Past Medical History:   Diagnosis Date    Hyperlipidemia     Hypertension     Iron deficiency anemia due to chronic blood loss     Nephrolithiasis 1/8/2019    Rheumatoid arthritis     Rheumatoid arthritis involving multiple sites with positive rheumatoid factor 4/16/2018    Syphilis        Past Surgical History:   Procedure Laterality Date    LIVER BIOPSY         Family History   Problem Relation Age of Onset    Diabetes Mellitus Mother     Inflammatory bowel disease Mother     Osteoarthritis Mother     Hypertension Father     Stroke Father     Cancer Neg Hx     Heart disease Neg Hx     Hyperlipidemia Neg Hx     Lupus Neg Hx     Kidney disease Neg Hx     Thyroid disease Neg Hx     Rheum arthritis Neg Hx     Psoriasis Neg Hx     Cirrhosis Neg Hx    Brother with lung cancer. No family history of colon cancer, small bowel cancer, pancreatic cancer, stomach cancer, esophageal cancer, breast cancer, kidney/ureter/bladder cancer, ovarian cancer.     Social History     Socioeconomic History    Marital status:      Spouse name: Not on file    Number of children: Not on file    Years of education: Not on file    Highest education level: Not on file   Occupational History    Not on file   Social Needs    Financial resource strain: Not on file    Food insecurity:     Worry: Not on file     Inability: Not on file    Transportation needs:     Medical: Not on file     Non-medical: Not on file   Tobacco Use    Smoking status: Former Smoker    Smokeless tobacco: Never Used   Substance and Sexual Activity    Alcohol use: No     Frequency: Never    Drug use: No    Sexual activity: Yes   Lifestyle    Physical activity:     Days per week: Not on file     Minutes per session: Not on file    Stress: Not on file   Relationships    Social  connections:     Talks on phone: Not on file     Gets together: Not on file     Attends Restorationist service: Not on file     Active member of club or organization: Not on file     Attends meetings of clubs or organizations: Not on file     Relationship status: Not on file    Intimate partner violence:     Fear of current or ex partner: Not on file     Emotionally abused: Not on file     Physically abused: Not on file     Forced sexual activity: Not on file   Other Topics Concern    Not on file   Social History Narrative    Not on file       Current Outpatient Medications on File Prior to Visit   Medication Sig Dispense Refill    amlodipine-atorvastatin (CADUET) 10-40 mg per tablet Take 1 tablet by mouth once daily.       cholecalciferol, vitamin D3, (VITAMIN D3) 1,000 unit capsule Take 1 capsule (1,000 Units total) by mouth once daily. 30 capsule 11    gabapentin (NEURONTIN) 100 MG capsule Take 1 capsule (100 mg total) by mouth nightly as needed (numbness and tingling). 30 capsule 2    hydroxychloroquine (PLAQUENIL) 200 mg tablet Take 1 tablet (200 mg total) by mouth 2 (two) times daily. 60 tablet 6    omeprazole (PRILOSEC) 40 MG capsule Take 1 capsule (40 mg total) by mouth once daily. 30 capsule 11    predniSONE (DELTASONE) 2.5 MG tablet Take 3 tablets (7.5 mg total) by mouth once daily. 90 tablet 2    BOOSTRIX TDAP 2.5-8-5 Lf-mcg-Lf/0.5mL Syrg injection       docusate sodium (COLACE) 100 MG capsule Take 1 capsule (100 mg total) by mouth 2 (two) times daily. 60 capsule 0    fish oil-omega-3 fatty acids 300-1,000 mg capsule Take 2 g by mouth once daily.      furosemide (LASIX) 20 MG tablet Take 20 mg by mouth as needed.       mupirocin calcium 2% (BACTROBAN) 2 % cream Apply topically 2 (two) times daily. 30 g 0    predniSONE (DELTASONE) 10 MG tablet Take 10 mg by mouth once daily.       promethazine (PHENERGAN) 25 MG tablet Take 1 tablet (25 mg total) by mouth every 6 (six) hours as needed for Nausea.  20 tablet 0    sildenafil (VIAGRA) 50 MG tablet Take 50 mg by mouth.      [DISCONTINUED] etanercept (ENBREL SURECLICK) 50 mg/mL (0.98 mL) PnIj Inject 50 mg into the skin every 7 days 3.92 mL 11     No current facility-administered medications on file prior to visit.        Review of patient's allergies indicates:  No Known Allergies    Physical Exam:  General: Alert and Oriented x3, no distress. Pleasant and friendly.  Vitals:    03/25/19 1600   BP: 131/84   Pulse: 77     HEENT: Normocephalic, Atraumatic. No scleral icterus.  Lymph: No cervical lymphadenopathy  Resp: Good air entry bilaterally, no adventitious sounds.  Cardiac: S1 and S2 normal.  Abdomen: Normoactive bowel sounds. Non-distended. Normal tympany. Soft. Non-tender. No peritoneal signs.  Extremities: No peripheral edema. Normal bilateral pedal and radial pulses.  Neurologic: No gross neurological Deficits  Psych: Calm, cooperative. Normal mood and affect.    Laboratory:  Lab Results   Component Value Date     01/12/2019    K 3.6 01/12/2019     (H) 01/12/2019    CO2 20 (L) 01/12/2019    BUN 12 01/12/2019    CREATININE 0.8 01/12/2019    CALCIUM 9.6 01/12/2019    ANIONGAP 7 (L) 01/12/2019    ESTGFRAFRICA >60.0 01/12/2019    EGFRNONAA >60.0 01/12/2019       Lab Results   Component Value Date    ALT 19 01/12/2019    AST 17 01/12/2019    ALKPHOS 61 01/12/2019    BILITOT 0.8 01/12/2019       Lab Results   Component Value Date    WBC 7.86 03/25/2019    HGB 14.8 03/25/2019    HCT 44.4 03/25/2019    MCV 91 03/25/2019     03/25/2019       Microbiology:  No Pertinent Microbiology    Imaging:  CTAP (1/8/19)  - liver normal in size and attenuation. No focal lesions  - no gallstones or biliary ductal dilatation  - pancreas unremarkable. No peripancreatic inflammatory changes or pancreatic ductal dilatation.    RUQ (12/12/18)  - pancreas normal  - liver enlarged, heterogeneous echotexture, no lesions  - no gallstones, wall thickening or  pericholecystic fluid  - CBD 2mm, no intrahepatic ductal dilatation    Assessment:  Massimo Funez is a 58 y.o. male who is presenting for post-hospitalization follow-up of anemia and elevated lipase.      Plan:  1. Elevated Lipase. Unclear the etiology of patient's elevated lipase. He did not experience abdominal pain and CTAP (with contrast, non-pancreatic protocol) was unremarkable - therefore was not meeting criteria for acute pancreatitis. Lipase can be elevated from variety of additional etiologies which could be the cause of his elevation.  1. Given age, history of ETOH and smoking, will request EUS to exclude pancreatic abnormality.  2. Will repeat lipase   2. Was anemic while hospitalized with concern for GI bleed (absence of overt signs of GI bleeding) with recovery of counts. Denied any NSAID though recent smoking and steroid use.  1. EGD to exclude PUD and duodenal etiology for elevated lipase  2. Colonoscopy for anemia   3. Will repeat CBC today  3. CRC Screening: Reports prior colonoscopy 2015 with ?2 polyps. Will repeat colonoscopy as above.  No follow-ups on file.    Kevin Herrera MD  Gastroenterology Fellow (PGY IV)  Phone: [phone]    Orders Placed This Encounter   Procedures    CBC auto differential    Lipase

## 2019-03-26 ENCOUNTER — TELEPHONE (OUTPATIENT)
Dept: GASTROENTEROLOGY | Facility: CLINIC | Age: 59
End: 2019-03-26

## 2019-03-26 ENCOUNTER — PATIENT MESSAGE (OUTPATIENT)
Dept: GASTROENTEROLOGY | Facility: CLINIC | Age: 59
End: 2019-03-26

## 2019-03-26 NOTE — PROGRESS NOTES
I was present with Kevin Herrera MD the fellow during the above evaluation, including history and exam.  I discussed the case with the fellow and agree with the findings and plan as documented in the fellow's note.

## 2019-03-26 NOTE — TELEPHONE ENCOUNTER
----- Message from Kevin Herrera MD sent at 3/25/2019  8:10 PM CDT -----  Hi,  Please let Mr Funez know that his labs from today (3/25) are normal.  - his anemia (low blood count) is back to normal  - the elevated lipase is back to normal.  - please continue with our plan for upper and lower endoscopies  Thanks,  Dr. Herrera

## 2019-03-28 ENCOUNTER — TELEPHONE (OUTPATIENT)
Dept: ENDOSCOPY | Facility: HOSPITAL | Age: 59
End: 2019-03-28

## 2019-04-05 ENCOUNTER — PATIENT MESSAGE (OUTPATIENT)
Dept: ENDOSCOPY | Facility: HOSPITAL | Age: 59
End: 2019-04-05

## 2019-04-09 ENCOUNTER — TELEPHONE (OUTPATIENT)
Dept: ENDOSCOPY | Facility: HOSPITAL | Age: 59
End: 2019-04-09

## 2019-04-22 ENCOUNTER — HOSPITAL ENCOUNTER (OUTPATIENT)
Facility: HOSPITAL | Age: 59
Discharge: HOME | End: 2019-04-22
Attending: INTERNAL MEDICINE | Admitting: INTERNAL MEDICINE
Payer: MEDICARE

## 2019-04-22 ENCOUNTER — ANESTHESIA EVENT (OUTPATIENT)
Dept: ENDOSCOPY | Facility: HOSPITAL | Age: 59
End: 2019-04-22
Payer: MEDICARE

## 2019-04-22 ENCOUNTER — ANESTHESIA (OUTPATIENT)
Dept: ENDOSCOPY | Facility: HOSPITAL | Age: 59
End: 2019-04-22
Payer: MEDICARE

## 2019-04-22 VITALS
OXYGEN SATURATION: 100 % | TEMPERATURE: 98 F | HEART RATE: 66 BPM | SYSTOLIC BLOOD PRESSURE: 118 MMHG | DIASTOLIC BLOOD PRESSURE: 70 MMHG | RESPIRATION RATE: 19 BRPM

## 2019-04-22 DIAGNOSIS — R74.8 ELEVATED LIPASE: ICD-10-CM

## 2019-04-22 DIAGNOSIS — R74.8 ELEVATED LIVER ENZYMES: Primary | ICD-10-CM

## 2019-04-22 PROCEDURE — 43259 EGD US EXAM DUODENUM/JEJUNUM: CPT | Mod: ,,, | Performed by: INTERNAL MEDICINE

## 2019-04-22 PROCEDURE — 88305 TISSUE EXAM BY PATHOLOGIST: CPT | Performed by: PATHOLOGY

## 2019-04-22 PROCEDURE — 88305 TISSUE EXAM BY PATHOLOGIST: CPT | Mod: 26,,, | Performed by: PATHOLOGY

## 2019-04-22 PROCEDURE — 25000003 PHARM REV CODE 250: Performed by: NURSE ANESTHETIST, CERTIFIED REGISTERED

## 2019-04-22 PROCEDURE — 37000008 HC ANESTHESIA 1ST 15 MINUTES: Performed by: INTERNAL MEDICINE

## 2019-04-22 PROCEDURE — 37000009 HC ANESTHESIA EA ADD 15 MINS: Performed by: INTERNAL MEDICINE

## 2019-04-22 PROCEDURE — D9220A PRA ANESTHESIA: ICD-10-PCS | Mod: CRNA,,, | Performed by: NURSE ANESTHETIST, CERTIFIED REGISTERED

## 2019-04-22 PROCEDURE — D9220A PRA ANESTHESIA: Mod: ANES,,, | Performed by: ANESTHESIOLOGY

## 2019-04-22 PROCEDURE — 43239 PR EGD, FLEX, W/BIOPSY, SGL/MULTI: ICD-10-PCS | Mod: 59,,, | Performed by: INTERNAL MEDICINE

## 2019-04-22 PROCEDURE — 43259 EGD US EXAM DUODENUM/JEJUNUM: CPT | Performed by: INTERNAL MEDICINE

## 2019-04-22 PROCEDURE — D9220A PRA ANESTHESIA: ICD-10-PCS | Mod: ANES,,, | Performed by: ANESTHESIOLOGY

## 2019-04-22 PROCEDURE — 63600175 PHARM REV CODE 636 W HCPCS: Performed by: NURSE ANESTHETIST, CERTIFIED REGISTERED

## 2019-04-22 PROCEDURE — 43259 PR ENDOSCOPIC ULTRASOUND EXAM: ICD-10-PCS | Mod: ,,, | Performed by: INTERNAL MEDICINE

## 2019-04-22 PROCEDURE — 88360 TUMOR IMMUNOHISTOCHEM/MANUAL: CPT | Mod: 26,,, | Performed by: PATHOLOGY

## 2019-04-22 PROCEDURE — 88360 TISSUE SPECIMEN TO PATHOLOGY - SURGERY: ICD-10-PCS | Mod: 26,,, | Performed by: PATHOLOGY

## 2019-04-22 PROCEDURE — 43239 EGD BIOPSY SINGLE/MULTIPLE: CPT | Performed by: INTERNAL MEDICINE

## 2019-04-22 PROCEDURE — 43239 EGD BIOPSY SINGLE/MULTIPLE: CPT | Mod: 59,,, | Performed by: INTERNAL MEDICINE

## 2019-04-22 PROCEDURE — 27201012 HC FORCEPS, HOT/COLD, DISP: Performed by: INTERNAL MEDICINE

## 2019-04-22 PROCEDURE — 88305 TISSUE SPECIMEN TO PATHOLOGY - SURGERY: ICD-10-PCS | Mod: 26,,, | Performed by: PATHOLOGY

## 2019-04-22 PROCEDURE — D9220A PRA ANESTHESIA: Mod: CRNA,,, | Performed by: NURSE ANESTHETIST, CERTIFIED REGISTERED

## 2019-04-22 PROCEDURE — 25000003 PHARM REV CODE 250: Performed by: INTERNAL MEDICINE

## 2019-04-22 RX ORDER — SODIUM CHLORIDE 9 MG/ML
INJECTION, SOLUTION INTRAVENOUS CONTINUOUS
Status: DISCONTINUED | OUTPATIENT
Start: 2019-04-22 | End: 2019-04-22 | Stop reason: HOSPADM

## 2019-04-22 RX ORDER — KETAMINE HYDROCHLORIDE 10 MG/ML
INJECTION, SOLUTION INTRAMUSCULAR; INTRAVENOUS
Status: DISCONTINUED | OUTPATIENT
Start: 2019-04-22 | End: 2019-04-22

## 2019-04-22 RX ORDER — FENTANYL CITRATE 50 UG/ML
INJECTION, SOLUTION INTRAMUSCULAR; INTRAVENOUS
Status: DISCONTINUED | OUTPATIENT
Start: 2019-04-22 | End: 2019-04-22

## 2019-04-22 RX ORDER — PROPOFOL 10 MG/ML
VIAL (ML) INTRAVENOUS
Status: DISCONTINUED | OUTPATIENT
Start: 2019-04-22 | End: 2019-04-22

## 2019-04-22 RX ORDER — SODIUM CHLORIDE 0.9 % (FLUSH) 0.9 %
10 SYRINGE (ML) INJECTION
Status: DISCONTINUED | OUTPATIENT
Start: 2019-04-22 | End: 2019-04-22 | Stop reason: HOSPADM

## 2019-04-22 RX ORDER — GLYCOPYRROLATE 0.2 MG/ML
INJECTION INTRAMUSCULAR; INTRAVENOUS
Status: DISCONTINUED | OUTPATIENT
Start: 2019-04-22 | End: 2019-04-22

## 2019-04-22 RX ORDER — PROPOFOL 10 MG/ML
VIAL (ML) INTRAVENOUS CONTINUOUS PRN
Status: DISCONTINUED | OUTPATIENT
Start: 2019-04-22 | End: 2019-04-22

## 2019-04-22 RX ADMIN — PROPOFOL 25 MG: 10 INJECTION, EMULSION INTRAVENOUS at 10:04

## 2019-04-22 RX ADMIN — GLYCOPYRROLATE 0.1 MG: 0.2 INJECTION, SOLUTION INTRAMUSCULAR; INTRAVENOUS at 10:04

## 2019-04-22 RX ADMIN — PROPOFOL 100 MCG/KG/MIN: 10 INJECTION, EMULSION INTRAVENOUS at 10:04

## 2019-04-22 RX ADMIN — SODIUM CHLORIDE: 0.9 INJECTION, SOLUTION INTRAVENOUS at 09:04

## 2019-04-22 RX ADMIN — FENTANYL CITRATE 25 MCG: 50 INJECTION, SOLUTION INTRAMUSCULAR; INTRAVENOUS at 10:04

## 2019-04-22 RX ADMIN — KETAMINE HYDROCHLORIDE 20 MG: 10 INJECTION, SOLUTION INTRAMUSCULAR; INTRAVENOUS at 10:04

## 2019-04-22 NOTE — PLAN OF CARE
PT is AAOx4. VSS. NAD. Dr. Jayson Coley spoke with family. Discharge instructions given, verbalized understanding. Denies pain or nausea. IV discontinued. Discharged home with family.

## 2019-04-22 NOTE — ANESTHESIA POSTPROCEDURE EVALUATION
Anesthesia Post Evaluation    Patient: Massimo Funez    Procedure(s) Performed: Procedure(s) (LRB):  ULTRASOUND, UPPER GI TRACT, ENDOSCOPIC (N/A)    Final Anesthesia Type: general  Patient location during evaluation: PACU  Patient participation: Yes- Able to Participate  Level of consciousness: awake and alert  Post-procedure vital signs: reviewed and stable  Pain management: adequate  Airway patency: patent  PONV status at discharge: No PONV  Anesthetic complications: no      Cardiovascular status: hemodynamically stable  Respiratory status: room air, spontaneous ventilation and unassisted  Hydration status: euvolemic  Follow-up not needed.          Vitals Value Taken Time   /72 4/22/2019  9:57 AM   Temp 36.8 °C (98.2 °F) 4/22/2019  9:57 AM   Pulse 65 4/22/2019  9:57 AM   Resp 17 4/22/2019  9:57 AM   SpO2 100 % 4/22/2019  9:57 AM         No case tracking events are documented in the log.      Pain/Surinder Score: No data recorded

## 2019-04-22 NOTE — [employer] PATIENT INSTRUCTIONS
Discharge Summary/Instructions after an Endoscopic Procedure  Patient Name: Massimo Funez  Patient MRN: [id number]  Patient YOB: 1960  Monday, April 22, 2019  Fermin Coley MD  RESTRICTIONS:  During your procedure today, you received medications for sedation.  These   medications may affect your judgment, balance and coordination.  Therefore,   for 24 hours, you have the following restrictions:   - DO NOT drive a car, operate machinery, make legal/financial decisions,   sign important papers or drink alcohol.    ACTIVITY:  Today: no heavy lifting, straining or running due to procedural   sedation/anesthesia.  The following day: return to full activity including work.  DIET:  Eat and drink normally unless instructed otherwise.     TREATMENT FOR COMMON SIDE EFFECTS:  - Mild abdominal pain, nausea, belching, bloating or excessive gas:  rest,   eat lightly and use a heating pad.  - Sore Throat: treat with throat lozenges and/or gargle with warm salt   water.  - Because air was used during the procedure, expelling large amounts of air   from your rectum or belching is normal.  - If a bowel prep was taken, you may not have a bowel movement for 1-3 days.    This is normal.  SYMPTOMS TO WATCH FOR AND REPORT TO YOUR PHYSICIAN:  1. Abdominal pain or bloating, other than gas cramps.  2. Chest pain.  3. Back pain.  4. Signs of infection such as: chills or fever occurring within 24 hours   after the procedure.  5. Rectal bleeding, which would show as bright red, maroon, or black stools.   (A tablespoon of blood from the rectum is not serious, especially if   hemorrhoids are present.)  6. Vomiting.  7. Weakness or dizziness.  GO DIRECTLY TO THE NEAREST EMERGENCY ROOM IF YOU HAVE ANY OF THE FOLLOWING:      Difficulty breathing              Chills and/or fever over 101 F   Persistent vomiting and/or vomiting blood   Severe abdominal pain   Severe chest pain   Black, tarry stools   Bleeding- more than one  tablespoon   Any other symptom or condition that you feel may need urgent attention  Your doctor recommends these additional instructions:  If any biopsies were taken, your doctors clinic will contact you in 1 to 2   weeks with any results.  - Discharge patient to home.   - Resume previous diet.   - Continue present medications.   - Return to referring physician.   - Await path results. Further recommendations based on path.  For questions, problems or results please call your physician - Fermin Coley MD at Work:  [phone].  [facility], EMERGENCY ROOM PHONE NUMBER: [phone]  IF A COMPLICATION OR EMERGENCY SITUATION ARISES AND YOU ARE UNABLE TO REACH   YOUR PHYSICIAN - GO DIRECTLY TO THE EMERGENCY ROOM.  Fermin Coley MD  4/22/2019 11:01:16 AM  This report has been verified and signed electronically.  [employer]

## 2019-04-22 NOTE — PLAN OF CARE
PT is AAOx4. VSS. NAD. IV discontinued. Discharge instructions given. Denies pain or nausea. Discharged home with family.

## 2019-04-22 NOTE — ANESTHESIA PREPROCEDURE EVALUATION
04/22/2019  Massimo Funez is a 58 y.o., male     Pre-operative evaluation for Procedure(s) (LRB):  ULTRASOUND, UPPER GI TRACT, ENDOSCOPIC (N/A)    Massimo Funez is a 58 y.o. male     LDA:     Prev airway:     Drips:     Patient Active Problem List   Diagnosis    Rheumatoid arthritis involving multiple sites with positive rheumatoid factor    Latent syphilis    Hypertension    Bilateral knee pain    Osteoarthritis    Hyperlipidemia    Elevated liver enzymes    Fatty liver    History of methotrexate therapy    Positive hepatitis C antibody test    Elevated amylase and lipase    Hypercalcemia    ALBARADO (nonalcoholic steatohepatitis)    Nephrolithiasis    Acute diarrhea    Intractable vomiting with nausea    Viral gastroenteritis    Syncope due to orthostatic hypotension    Melena    Iron deficiency anemia due to chronic blood loss       Review of patient's allergies indicates:  No Known Allergies     No current facility-administered medications on file prior to encounter.      Current Outpatient Medications on File Prior to Encounter   Medication Sig Dispense Refill    amlodipine-atorvastatin (CADUET) 10-40 mg per tablet Take 1 tablet by mouth once daily.       BOOSTRIX TDAP 2.5-8-5 Lf-mcg-Lf/0.5mL Syrg injection       cholecalciferol, vitamin D3, (VITAMIN D3) 1,000 unit capsule Take 1 capsule (1,000 Units total) by mouth once daily. 30 capsule 11    docusate sodium (COLACE) 100 MG capsule Take 1 capsule (100 mg total) by mouth 2 (two) times daily. 60 capsule 0    fish oil-omega-3 fatty acids 300-1,000 mg capsule Take 2 g by mouth once daily.      furosemide (LASIX) 20 MG tablet Take 20 mg by mouth as needed.       gabapentin (NEURONTIN) 100 MG capsule Take 1 capsule (100 mg total) by mouth nightly as needed (numbness and tingling). 30 capsule 2    hydroxychloroquine  (PLAQUENIL) 200 mg tablet Take 1 tablet (200 mg total) by mouth 2 (two) times daily. 60 tablet 6    mupirocin calcium 2% (BACTROBAN) 2 % cream Apply topically 2 (two) times daily. 30 g 0    omeprazole (PRILOSEC) 40 MG capsule Take 1 capsule (40 mg total) by mouth once daily. 30 capsule 11    predniSONE (DELTASONE) 10 MG tablet Take 10 mg by mouth once daily.       promethazine (PHENERGAN) 25 MG tablet Take 1 tablet (25 mg total) by mouth every 6 (six) hours as needed for Nausea. 20 tablet 0    sildenafil (VIAGRA) 50 MG tablet Take 50 mg by mouth.         Past Surgical History:   Procedure Laterality Date    LIVER BIOPSY         Social History     Socioeconomic History    Marital status:      Spouse name: Not on file    Number of children: Not on file    Years of education: Not on file    Highest education level: Not on file   Occupational History    Not on file   Social Needs    Financial resource strain: Not on file    Food insecurity:     Worry: Not on file     Inability: Not on file    Transportation needs:     Medical: Not on file     Non-medical: Not on file   Tobacco Use    Smoking status: Former Smoker    Smokeless tobacco: Never Used   Substance and Sexual Activity    Alcohol use: No     Frequency: Never    Drug use: No    Sexual activity: Yes   Lifestyle    Physical activity:     Days per week: Not on file     Minutes per session: Not on file    Stress: Not on file   Relationships    Social connections:     Talks on phone: Not on file     Gets together: Not on file     Attends Sikhism service: Not on file     Active member of club or organization: Not on file     Attends meetings of clubs or organizations: Not on file     Relationship status: Not on file   Other Topics Concern    Not on file   Social History Narrative    Not on file         Vital Signs Range (Last 24H):         CBC: No results for input(s): WBC, RBC, HGB, HCT, PLT, MCV, MCH, MCHC in the last 72  hours.    CMP: No results for input(s): NA, K, CL, CO2, BUN, CREATININE, GLU, MG, PHOS, CALCIUM, ALBUMIN, PROT, ALKPHOS, ALT, AST, BILITOT in the last 72 hours.    INR  No results for input(s): PT, INR, PROTIME, APTT in the last 72 hours.        Diagnostic Studies:      EKD Echo:      .    Anesthesia Evaluation         Review of Systems      Physical Exam  General:  Well nourished    Airway/Jaw/Neck:  Airway Findings: Mouth Opening: Normal Tongue: Normal  General Airway Assessment: Adult  Mallampati: II  Improves to II with phonation.  TM Distance: Normal, at least 6 cm            Mental Status:  Mental Status Findings:  Cooperative, Alert and Oriented         Anesthesia Plan  Type of Anesthesia, risks & benefits discussed:  Anesthesia Type:  general  Patient's Preference:   Intra-op Monitoring Plan: standard ASA monitors  Intra-op Monitoring Plan Comments:   Post Op Pain Control Plan: multimodal analgesia  Post Op Pain Control Plan Comments:   Induction:   IV  Beta Blocker:  Patient is not currently on a Beta-Blocker (No further documentation required).       Informed Consent: Patient understands risks and agrees with Anesthesia plan.  Questions answered. Anesthesia consent signed with patient.  ASA Score: 3     Day of Surgery Review of History & Physical:    H&P update referred to the surgeon.         Ready For Surgery From Anesthesia Perspective.

## 2019-04-22 NOTE — DISCHARGE INSTRUCTIONS

## 2019-04-22 NOTE — H&P
History & Physical - Short Stay  Gastroenterology      SUBJECTIVE:     Procedure: EUS    Chief Complaint/Indication for Procedure: elevated lipase  History of Present Illness:  Patient is a 58 y.o. male with elevated lipase coming for EUS.    PTA Medications   Medication Sig    amlodipine-atorvastatin (CADUET) 10-40 mg per tablet Take 1 tablet by mouth once daily.     cholecalciferol, vitamin D3, (VITAMIN D3) 1,000 unit capsule Take 1 capsule (1,000 Units total) by mouth once daily.    fish oil-omega-3 fatty acids 300-1,000 mg capsule Take 2 g by mouth once daily.    hydroxychloroquine (PLAQUENIL) 200 mg tablet Take 1 tablet (200 mg total) by mouth 2 (two) times daily.    mupirocin calcium 2% (BACTROBAN) 2 % cream Apply topically 2 (two) times daily.    omeprazole (PRILOSEC) 40 MG capsule Take 1 capsule (40 mg total) by mouth once daily.    predniSONE (DELTASONE) 10 MG tablet Take 10 mg by mouth once daily.     BOOSTRIX TDAP 2.5-8-5 Lf-mcg-Lf/0.5mL Syrg injection     docusate sodium (COLACE) 100 MG capsule Take 1 capsule (100 mg total) by mouth 2 (two) times daily.    furosemide (LASIX) 20 MG tablet Take 20 mg by mouth as needed.     gabapentin (NEURONTIN) 100 MG capsule Take 1 capsule (100 mg total) by mouth nightly as needed (numbness and tingling).    promethazine (PHENERGAN) 25 MG tablet Take 1 tablet (25 mg total) by mouth every 6 (six) hours as needed for Nausea.    sildenafil (VIAGRA) 50 MG tablet Take 50 mg by mouth.       Review of patient's allergies indicates:  No Known Allergies     Past Medical History:   Diagnosis Date    Diarrhea     Elevated lipase     Hepatitis C     Hepatomegaly     Hyperlipidemia     Hypertension     Iron deficiency anemia due to chronic blood loss     Nausea & vomiting     Nephrolithiasis 1/8/2019    Rheumatoid arthritis     Rheumatoid arthritis involving multiple sites with positive rheumatoid factor 4/16/2018    Syphilis      Past Surgical History:    Procedure Laterality Date    LIVER BIOPSY       Family History   Problem Relation Age of Onset    Diabetes Mellitus Mother     Inflammatory bowel disease Mother     Osteoarthritis Mother     Hypertension Father     Stroke Father     Cancer Neg Hx     Heart disease Neg Hx     Hyperlipidemia Neg Hx     Lupus Neg Hx     Kidney disease Neg Hx     Thyroid disease Neg Hx     Rheum arthritis Neg Hx     Psoriasis Neg Hx     Cirrhosis Neg Hx      Social History     Tobacco Use    Smoking status: Former Smoker    Smokeless tobacco: Never Used   Substance Use Topics    Alcohol use: No     Frequency: Never    Drug use: No            OBJECTIVE:     Vital Signs (Most Recent)  Temp: 98.2 °F (36.8 °C) (04/22/19 0957)  Pulse: 65 (04/22/19 0957)  Resp: 17 (04/22/19 0957)  BP: 121/72 (04/22/19 0957)  SpO2: 100 % (04/22/19 0957)         ASSESSMENT/PLAN:     Patient is a 58 y.o. male with elevated lipase coming for EUS.    Plan: EUS    Anesthesia Plan: Moderate Sedation    ASA Grade: ASA 2 - Patient with mild systemic disease with no functional limitations

## 2019-04-22 NOTE — TRANSFER OF CARE
Anesthesia Transfer of Care Note    Patient: Massimo Funez    Procedure(s) Performed: Procedure(s) (LRB):  ULTRASOUND, UPPER GI TRACT, ENDOSCOPIC (N/A)    Patient location: [facility]    Anesthesia Type: general    Transport from OR: Transported from OR on room air with adequate spontaneous ventilation    Post pain: adequate analgesia    Post assessment: no apparent anesthetic complications    Post vital signs: stable    Level of consciousness: awake, alert and oriented    Nausea/Vomiting: no nausea/vomiting    Complications: none    Transfer of care protocol was followed      Last vitals:   Visit Vitals  /72 (BP Location: Left arm, Patient Position: Lying)   Pulse 65   Temp 36.8 °C (98.2 °F) (Temporal)   Resp 17   SpO2 100%

## 2019-04-25 DIAGNOSIS — M05.79 RHEUMATOID ARTHRITIS INVOLVING MULTIPLE SITES WITH POSITIVE RHEUMATOID FACTOR: Primary | ICD-10-CM

## 2019-04-26 DIAGNOSIS — M05.79 RHEUMATOID ARTHRITIS INVOLVING MULTIPLE SITES WITH POSITIVE RHEUMATOID FACTOR: Primary | ICD-10-CM

## 2019-04-26 RX ORDER — HYDROXYCHLOROQUINE SULFATE 200 MG/1
TABLET, FILM COATED ORAL
Qty: 60 TABLET | Refills: 0 | Status: SHIPPED | OUTPATIENT
Start: 2019-04-26 | End: 2019-06-06 | Stop reason: SDUPTHER

## 2019-04-26 RX ORDER — HYDROXYCHLOROQUINE SULFATE 200 MG/1
200 TABLET, FILM COATED ORAL 2 TIMES DAILY
Qty: 60 TABLET | Refills: 2 | Status: SHIPPED | OUTPATIENT
Start: 2019-04-26 | End: 2019-04-26

## 2019-04-30 NOTE — PHYSICIAN QUERY
PT Name: Massimo Funez  MR #: [id number]     Physician Query Form - Documentation Clarification      CDS/: Kirstin Garcia               Contact information:    This form is a permanent document in the medical record.     Query Date: April 30, 2019    By submitting this query, we are merely seeking further clarification of documentation. Please utilize your independent clinical judgment when addressing the question(s) below.    The Medical record reflects the following:    Supporting Clinical Findings Location in Medical Record     FINAL PATHOLOGIC DIAGNOSIS  1. Duodenal bulb nodule, biopsy:  - Well-differentiated neuroendocrine tumor, G1  Ki-67: Positive, see below  Chromogranin: Positive, see below  Synaptophysin: Positive, see below  CD31: Positive, see below  Chromogranin Staining  Intensity Positive cells (%)  0 : 0 %  1+ : 0 %  2+ : 0 %  3+ : 100 %  Synaptophysin Staining  Intensity Positive cells (%)  0 : 0 %  1+ : 0 %  2+ : 0 %  3+ : 100 %  CD31: 5 vessels per 1 HPF  Ki67: 1-2 % cells staining  [facility]     Path report                                                                                      Doctor, Please specify diagnosis or diagnoses associated with above clinical findings.    Provider Use Only     ___X__  Benign neuroendocrine duodenum tumor     _____ Malignant neuroendocrine duodenum tumor                                                                                                                         [  ] Clinically Undetermined

## 2019-05-10 PROBLEM — R19.7 ACUTE DIARRHEA: Status: RESOLVED | Noted: 2019-01-08 | Resolved: 2019-05-10

## 2019-05-10 PROBLEM — A08.4 VIRAL GASTROENTERITIS: Status: RESOLVED | Noted: 2019-01-09 | Resolved: 2019-05-10

## 2019-05-10 PROBLEM — K92.1 MELENA: Status: RESOLVED | Noted: 2019-01-12 | Resolved: 2019-05-10

## 2019-05-10 PROBLEM — R11.2 INTRACTABLE VOMITING WITH NAUSEA: Status: RESOLVED | Noted: 2019-01-09 | Resolved: 2019-05-10

## 2019-05-10 NOTE — PROGRESS NOTES
[facility] UGI MULTIDISCIPLINARY TUMOR BOARD  PATIENT REVIEW FORM   ____________________________________________________________    CLINIC #: [id number]  DATE: 5/13/2019    DIAGNOSIS:  NET    PRESENTER: Jayson Coley    PATIENT SUMMARY:   This 59 y/o gentleman presented with elevated lipase. PMH includes ALBARADO, HCV dx in 2002 no tx, RA dx in 2002 on chronic prednisone 10mg QD. He underwent EUS, which found intramural 15mm lesion in apex of duodenal bulb. Pathology revealed G1, well differentiated NET    BOARD RECOMMENDATIONS:   15cm is upper limit of what is attainable with ESD so will attempt ESD first  If not, minimally invasive robotic option may be option.    CONSULT NEEDED:     [] Surgery    [] Hem/Onc    [] Rad/Onc    [] Dietary                 [] Social Service    [] Psychology       [x] AES  [] Radiology     Clinical Stage: Tumor 2 Node(s) 0 Metastasis 0      GROUP STAGE:  [] O    [] 1A    [] IB    [x] IIA    [] IIB     [] IIIA     [] IIIB     [] IIIC    []IV                               Metastatic site(s): 0         [x] [facility] Treatment Guidelines reviewed and care planned is consistent with guidelines.         (i.e., NCCN, NCI, PD, ACO, AUA, etc.)    PRESENTATION AT CANCER CONFERENCE:         [x] Prospective    [] Retrospective     [] Follow-Up

## 2019-05-13 ENCOUNTER — TUMOR BOARD CONFERENCE (OUTPATIENT)
Dept: SURGERY | Facility: CLINIC | Age: 59
End: 2019-05-13

## 2019-05-13 ENCOUNTER — TELEPHONE (OUTPATIENT)
Dept: ENDOSCOPY | Facility: HOSPITAL | Age: 59
End: 2019-05-13

## 2019-05-13 DIAGNOSIS — K31.9 GASTRIC LESION: Primary | ICD-10-CM

## 2019-05-13 DIAGNOSIS — C7A.8 PRIMARY MALIGNANT NEUROENDOCRINE TUMOR OF DUODENUM: ICD-10-CM

## 2019-05-14 ENCOUNTER — PATIENT MESSAGE (OUTPATIENT)
Dept: INTERNAL MEDICINE | Facility: CLINIC | Age: 59
End: 2019-05-14

## 2019-05-15 PROBLEM — C7A.8 PRIMARY MALIGNANT NEUROENDOCRINE TUMOR OF DUODENUM: Status: ACTIVE | Noted: 2019-05-15

## 2019-05-15 RX ORDER — AMLODIPINE BESYLATE AND ATORVASTATIN CALCIUM 10; 40 MG/1; MG/1
1 TABLET, FILM COATED ORAL DAILY
Qty: 90 TABLET | Refills: 3 | Status: SHIPPED | OUTPATIENT
Start: 2019-05-15

## 2019-05-17 ENCOUNTER — TELEPHONE (OUTPATIENT)
Dept: ENDOSCOPY | Facility: HOSPITAL | Age: 59
End: 2019-05-17

## 2019-05-22 ENCOUNTER — TELEPHONE (OUTPATIENT)
Dept: ENDOSCOPY | Facility: HOSPITAL | Age: 59
End: 2019-05-22

## 2019-05-22 NOTE — TELEPHONE ENCOUNTER
Spoke with patient. EGD/EMR scheduled for 6/10 at 1p. Reviewed prep instructions. Mr Funez verbalized understanding.

## 2019-05-23 ENCOUNTER — TELEPHONE (OUTPATIENT)
Dept: ENDOSCOPY | Facility: HOSPITAL | Age: 59
End: 2019-05-23

## 2019-05-23 NOTE — TELEPHONE ENCOUNTER
Spoke to pt, he is scheduled for an EGD/EMR on 6/10/19 at 1:00 pm, medical history/medications reviewed and prep instructions mailed to pt.

## 2019-05-27 ENCOUNTER — LAB VISIT (OUTPATIENT)
Dept: LAB | Facility: HOSPITAL | Age: 59
End: 2019-05-27
Attending: INTERNAL MEDICINE
Payer: MEDICARE

## 2019-05-27 DIAGNOSIS — M06.9 RHEUMATOID ARTHRITIS INVOLVING MULTIPLE JOINTS: ICD-10-CM

## 2019-05-27 LAB
ALBUMIN SERPL BCP-MCNC: 3.8 G/DL (ref 3.5–5.2)
ALP SERPL-CCNC: 82 U/L (ref 55–135)
ALT SERPL W/O P-5'-P-CCNC: 21 U/L (ref 10–44)
ANION GAP SERPL CALC-SCNC: 9 MMOL/L (ref 8–16)
AST SERPL-CCNC: 20 U/L (ref 10–40)
BASOPHILS # BLD AUTO: 0.03 K/UL (ref 0–0.2)
BASOPHILS NFR BLD: 0.5 % (ref 0–1.9)
BILIRUB SERPL-MCNC: 0.9 MG/DL (ref 0.1–1)
BUN SERPL-MCNC: 12 MG/DL (ref 6–20)
CALCIUM SERPL-MCNC: 10.4 MG/DL (ref 8.7–10.5)
CHLORIDE SERPL-SCNC: 107 MMOL/L (ref 95–110)
CO2 SERPL-SCNC: 28 MMOL/L (ref 23–29)
CREAT SERPL-MCNC: 0.9 MG/DL (ref 0.5–1.4)
CRP SERPL-MCNC: 5.7 MG/L (ref 0–8.2)
DIFFERENTIAL METHOD: ABNORMAL
EOSINOPHIL # BLD AUTO: 0 K/UL (ref 0–0.5)
EOSINOPHIL NFR BLD: 0.3 % (ref 0–8)
ERYTHROCYTE [DISTWIDTH] IN BLOOD BY AUTOMATED COUNT: 13.3 % (ref 11.5–14.5)
ERYTHROCYTE [SEDIMENTATION RATE] IN BLOOD BY WESTERGREN METHOD: 12 MM/HR (ref 0–23)
EST. GFR  (AFRICAN AMERICAN): >60 ML/MIN/1.73 M^2
EST. GFR  (NON AFRICAN AMERICAN): >60 ML/MIN/1.73 M^2
GLUCOSE SERPL-MCNC: 86 MG/DL (ref 70–110)
HCT VFR BLD AUTO: 40.6 % (ref 40–54)
HGB BLD-MCNC: 13.7 G/DL (ref 14–18)
IMM GRANULOCYTES # BLD AUTO: 0.01 K/UL (ref 0–0.04)
IMM GRANULOCYTES NFR BLD AUTO: 0.2 % (ref 0–0.5)
LYMPHOCYTES # BLD AUTO: 2.7 K/UL (ref 1–4.8)
LYMPHOCYTES NFR BLD: 45.8 % (ref 18–48)
MCH RBC QN AUTO: 30.5 PG (ref 27–31)
MCHC RBC AUTO-ENTMCNC: 33.7 G/DL (ref 32–36)
MCV RBC AUTO: 90 FL (ref 82–98)
MONOCYTES # BLD AUTO: 0.3 K/UL (ref 0.3–1)
MONOCYTES NFR BLD: 5.6 % (ref 4–15)
NEUTROPHILS # BLD AUTO: 2.8 K/UL (ref 1.8–7.7)
NEUTROPHILS NFR BLD: 47.6 % (ref 38–73)
NRBC BLD-RTO: 0 /100 WBC
PLATELET # BLD AUTO: 159 K/UL (ref 150–350)
PMV BLD AUTO: 11.4 FL (ref 9.2–12.9)
POTASSIUM SERPL-SCNC: 3.6 MMOL/L (ref 3.5–5.1)
PROT SERPL-MCNC: 7.2 G/DL (ref 6–8.4)
RBC # BLD AUTO: 4.49 M/UL (ref 4.6–6.2)
SODIUM SERPL-SCNC: 144 MMOL/L (ref 136–145)
WBC # BLD AUTO: 5.9 K/UL (ref 3.9–12.7)

## 2019-05-27 PROCEDURE — 86140 C-REACTIVE PROTEIN: CPT

## 2019-05-27 PROCEDURE — 85025 COMPLETE CBC W/AUTO DIFF WBC: CPT

## 2019-05-27 PROCEDURE — 36415 COLL VENOUS BLD VENIPUNCTURE: CPT

## 2019-05-27 PROCEDURE — 80053 COMPREHEN METABOLIC PANEL: CPT

## 2019-05-27 PROCEDURE — 85652 RBC SED RATE AUTOMATED: CPT

## 2019-05-29 ENCOUNTER — PATIENT MESSAGE (OUTPATIENT)
Dept: RHEUMATOLOGY | Facility: CLINIC | Age: 59
End: 2019-05-29

## 2019-05-29 RX ORDER — PREDNISONE 2.5 MG/1
7.5 TABLET ORAL DAILY
Qty: 90 TABLET | Refills: 2 | Status: SHIPPED | OUTPATIENT
Start: 2019-05-29 | End: 2019-06-06 | Stop reason: SDUPTHER

## 2019-06-06 ENCOUNTER — OFFICE VISIT (OUTPATIENT)
Dept: RHEUMATOLOGY | Facility: CLINIC | Age: 59
End: 2019-06-06
Payer: MEDICARE

## 2019-06-06 VITALS
DIASTOLIC BLOOD PRESSURE: 72 MMHG | WEIGHT: 223.56 LBS | HEART RATE: 61 BPM | HEIGHT: 69 IN | SYSTOLIC BLOOD PRESSURE: 133 MMHG | BODY MASS INDEX: 33.11 KG/M2

## 2019-06-06 DIAGNOSIS — Z79.899 LONG-TERM USE OF PLAQUENIL: ICD-10-CM

## 2019-06-06 DIAGNOSIS — K75.81 NASH (NONALCOHOLIC STEATOHEPATITIS): ICD-10-CM

## 2019-06-06 DIAGNOSIS — M05.79 RHEUMATOID ARTHRITIS INVOLVING MULTIPLE SITES WITH POSITIVE RHEUMATOID FACTOR: ICD-10-CM

## 2019-06-06 PROCEDURE — 99213 OFFICE O/P EST LOW 20 MIN: CPT | Mod: PBBFAC | Performed by: INTERNAL MEDICINE

## 2019-06-06 PROCEDURE — 99214 OFFICE O/P EST MOD 30 MIN: CPT | Mod: S$PBB,,, | Performed by: INTERNAL MEDICINE

## 2019-06-06 PROCEDURE — 99214 PR OFFICE/OUTPT VISIT, EST, LEVL IV, 30-39 MIN: ICD-10-PCS | Mod: S$PBB,,, | Performed by: INTERNAL MEDICINE

## 2019-06-06 PROCEDURE — 99999 PR PBB SHADOW E&M-EST. PATIENT-LVL III: ICD-10-PCS | Mod: PBBFAC,,, | Performed by: INTERNAL MEDICINE

## 2019-06-06 PROCEDURE — 99999 PR PBB SHADOW E&M-EST. PATIENT-LVL III: CPT | Mod: PBBFAC,,, | Performed by: INTERNAL MEDICINE

## 2019-06-06 RX ORDER — PREDNISONE 2.5 MG/1
7.5 TABLET ORAL DAILY
Qty: 90 TABLET | Refills: 4 | Status: SHIPPED | OUTPATIENT
Start: 2019-06-06 | End: 2019-06-06 | Stop reason: SDUPTHER

## 2019-06-06 RX ORDER — PREDNISONE 2.5 MG/1
7.5 TABLET ORAL DAILY
Qty: 90 TABLET | Refills: 4 | Status: SHIPPED | OUTPATIENT
Start: 2019-06-06 | End: 2019-07-06

## 2019-06-06 RX ORDER — HYDROXYCHLOROQUINE SULFATE 200 MG/1
200 TABLET, FILM COATED ORAL 2 TIMES DAILY
Qty: 60 TABLET | Refills: 11 | Status: SHIPPED | OUTPATIENT
Start: 2019-06-06

## 2019-06-06 ASSESSMENT — ROUTINE ASSESSMENT OF PATIENT INDEX DATA (RAPID3)
MDHAQ FUNCTION SCORE: .3
AM STIFFNESS SCORE: 1, YES
PSYCHOLOGICAL DISTRESS SCORE: 1.1
FATIGUE SCORE: 0
WHEN YOU AWAKENED IN THE MORNING OVER THE LAST WEEK, PLEASE INDICATE THE AMOUNT OF TIME IT TAKES UNTIL YOU ARE AS LIMBER AS YOU WILL BE FOR THE DAY: 1HR
TOTAL RAPID3 SCORE: 3.5
PAIN SCORE: 5.5
PATIENT GLOBAL ASSESSMENT SCORE: 4

## 2019-06-06 NOTE — PROGRESS NOTES
Subjective:       Patient ID: Massimo Funez is a 58 y.o. male.    Chief Complaint: No chief complaint on file.    HPI 58 year old with PMH of HTN, HL, hepatitis C ( diagnosed in 2002 never required treatment), latent syphilis, RA (diagnosed in 2002), ALBARADO here for evaluation. In 2002, he developed pain and swelling of both knees.  Then it went into hands, shoulders, and wrists. He was started on plaquenil and prednisone initially and was then put on MTX. He has been off and on MTX. He was taking MTX  6 pills a week and then it stopped due to elevated LFTS in august. Reports he is taking prednisone 10mg a day.  He reports without prednisone, he is not able to function. He reports lower back pain in last year. Pain level in lower back can be as high as 4/10. ]  Pain level today is 2/10. He reports on occasion having left ankle swelling and left foot swelling. Denies swelling.  Denies oral ulcers, fevers, oral ulcers, raynauds, or pleurisy.        Interval history: He is taking prednisone 7.5mg a day. He is taking plaquenil BID.   Denies joint pain, swelling, or stiffness.  He is trying to lose weight and eat healthy.         Past Medical History:   Diagnosis Date    Hyperlipidemia     Hypertension     Rheumatoid arthritis involving multiple sites with positive rheumatoid factor 4/16/2018       Review of Systems   Constitutional: Negative for activity change, appetite change, chills, diaphoresis, fatigue and fever.   HENT: Negative for ear discharge, ear pain, hearing loss, mouth sores, nosebleeds and sinus pressure.    Eyes: Negative.  Negative for photophobia, pain, discharge, redness and itching.   Respiratory: Negative for cough, chest tightness and shortness of breath.    Cardiovascular: Negative for chest pain, palpitations and leg swelling.   Gastrointestinal: Negative for abdominal distention, blood in stool and nausea.   Endocrine: Negative for cold intolerance, heat intolerance, polydipsia and  polyphagia.   Genitourinary: Negative for flank pain, genital sores and hematuria.   Musculoskeletal: Positive for arthralgias and joint swelling. Negative for back pain, gait problem, myalgias, neck pain and neck stiffness.   Skin: Negative for color change, pallor and rash.   Neurological: Negative for dizziness, weakness, light-headedness and headaches.   Hematological: Negative for adenopathy. Does not bruise/bleed easily.   Psychiatric/Behavioral: Negative for decreased concentration and hallucinations. The patient is not nervous/anxious.              Objective:     Physical Exam   Constitutional: He is oriented to person, place, and time and well-developed, well-nourished, and in no distress. No distress.   HENT:   Head: Normocephalic and atraumatic.   Right Ear: External ear normal.   Left Ear: External ear normal.   Eyes: Conjunctivae are normal. Pupils are equal, round, and reactive to light.   Neck: Normal range of motion. Neck supple. No JVD present. No tracheal deviation present. No thyromegaly present.   Cardiovascular: Normal rate, regular rhythm, normal heart sounds and intact distal pulses.  Exam reveals no gallop and no friction rub.    No murmur heard.  Pulmonary/Chest: Effort normal and breath sounds normal. No stridor. No respiratory distress. He has no wheezes. He has no rales. He exhibits no tenderness.   Abdominal: Soft. Bowel sounds are normal. He exhibits no distension and no mass. There is no tenderness. There is no rebound and no guarding.   Lymphadenopathy:     He has no cervical adenopathy.   Neurological: He is alert and oriented to person, place, and time. No cranial nerve deficit. Gait normal. Coordination normal.   Skin: Skin is warm and dry. No rash noted. He is not diaphoretic. No erythema. No pallor.     Musculoskeletal: Normal range of motion. He exhibits no edema, tenderness or deformity.   FROM of all joints including neck, shoulders, spine, ankles, wrists, knees, and ankles;  no joint deformities noted or effusions, erythema or warmth; no tophi or nodules noted; no crepitus; no synovitis noted in hands or feet; no nail pitting or onycholysis                Labs: reviewed  ccp-21  rf-37  Mariana-+      Arthritis survey (2018): I personally reviewed;  Erosive or inflammatory arthritis is not identified. There is moderate osteoarthritis at both knees and mild arthritis noted at the mid tarsal joints and at the carpal joints of both wrists. Spurs and osteophytes are noted in the cervical spine especially at C4-5 anteriorly.    A/p:58 year old with PMH of HTN, HL, hepatitis C ( diagnosed in 2002 never required treatment), latent syphilis, RA (diagnosed in 2002), ALBARADO here for evaluation.  Patient previously followed by Dr. Fox.  Was on MTX but was stopped due to transaminitis.  Patient currently on prednisone 7.5 mg a day.   He took one injection of enbrel but then developed viral GI illness. He reports that with losing weight, he has less pain and swelling so will continue the plaquenil.      continue plaquenil 200mg po BID   Decrease prednisone from 7.5mg a day to 5 mg a day  Encourage weight loss  Consider enbrel if trouble weaning off steroid     2) ALBARADO: encourage weight loss  Follow up with hepatology      *

## 2019-06-06 NOTE — PROGRESS NOTES
Rapid3 Question Responses and Scores 5/31/2019   MDHAQ Score 0.3   Psychologic Score 1.1   Pain Score 5.5   When you awakened in the morning OVER THE LAST WEEK, did you feel stiff? Yes   If Yes, please indicate the number of hours until you are as limber as you will be for the day 1   Fatigue Score 0   Global Health Score 4   RAPID3 Score 3.5

## 2019-06-10 ENCOUNTER — HOSPITAL ENCOUNTER (OUTPATIENT)
Facility: HOSPITAL | Age: 59
Discharge: HOME | End: 2019-06-10
Attending: INTERNAL MEDICINE | Admitting: INTERNAL MEDICINE
Payer: MEDICARE

## 2019-06-10 ENCOUNTER — ANESTHESIA EVENT (OUTPATIENT)
Dept: ENDOSCOPY | Facility: HOSPITAL | Age: 59
End: 2019-06-10
Payer: MEDICARE

## 2019-06-10 ENCOUNTER — ANESTHESIA (OUTPATIENT)
Dept: ENDOSCOPY | Facility: HOSPITAL | Age: 59
End: 2019-06-10
Payer: MEDICARE

## 2019-06-10 VITALS
TEMPERATURE: 98 F | WEIGHT: 222 LBS | SYSTOLIC BLOOD PRESSURE: 109 MMHG | DIASTOLIC BLOOD PRESSURE: 68 MMHG | OXYGEN SATURATION: 100 % | HEIGHT: 70 IN | HEART RATE: 48 BPM | BODY MASS INDEX: 31.78 KG/M2 | RESPIRATION RATE: 18 BRPM

## 2019-06-10 DIAGNOSIS — E34.0 DUODENAL CARCINOID SYNDROME: Primary | ICD-10-CM

## 2019-06-10 PROCEDURE — 43254 PR EGD, FLEX, W/MUCOSAL RESECTION: ICD-10-PCS | Mod: ,,, | Performed by: INTERNAL MEDICINE

## 2019-06-10 PROCEDURE — 88360 TISSUE SPECIMEN TO PATHOLOGY - SURGERY: ICD-10-PCS | Mod: 26,,, | Performed by: PATHOLOGY

## 2019-06-10 PROCEDURE — 88360 TUMOR IMMUNOHISTOCHEM/MANUAL: CPT | Mod: 26,,, | Performed by: PATHOLOGY

## 2019-06-10 PROCEDURE — 27201012 HC FORCEPS, HOT/COLD, DISP: Performed by: INTERNAL MEDICINE

## 2019-06-10 PROCEDURE — D9220A PRA ANESTHESIA: ICD-10-PCS | Mod: CRNA,,, | Performed by: NURSE ANESTHETIST, CERTIFIED REGISTERED

## 2019-06-10 PROCEDURE — 37000009 HC ANESTHESIA EA ADD 15 MINS: Performed by: INTERNAL MEDICINE

## 2019-06-10 PROCEDURE — 43254 EGD ENDO MUCOSAL RESECTION: CPT | Performed by: INTERNAL MEDICINE

## 2019-06-10 PROCEDURE — 27201042 HC RETRIEVAL NET: Performed by: INTERNAL MEDICINE

## 2019-06-10 PROCEDURE — 25000003 PHARM REV CODE 250: Performed by: INTERNAL MEDICINE

## 2019-06-10 PROCEDURE — 88342 IMHCHEM/IMCYTCHM 1ST ANTB: CPT | Performed by: PATHOLOGY

## 2019-06-10 PROCEDURE — D9220A PRA ANESTHESIA: Mod: CRNA,,, | Performed by: NURSE ANESTHETIST, CERTIFIED REGISTERED

## 2019-06-10 PROCEDURE — D9220A PRA ANESTHESIA: Mod: ANES,,, | Performed by: ANESTHESIOLOGY

## 2019-06-10 PROCEDURE — 27201028 HC NEEDLE, SCLERO: Performed by: INTERNAL MEDICINE

## 2019-06-10 PROCEDURE — 37000008 HC ANESTHESIA 1ST 15 MINUTES: Performed by: INTERNAL MEDICINE

## 2019-06-10 PROCEDURE — 27200997: Performed by: INTERNAL MEDICINE

## 2019-06-10 PROCEDURE — 27201240 HC KIT, EMR: Performed by: INTERNAL MEDICINE

## 2019-06-10 PROCEDURE — 27202363 HC INJECTION AGENT, SUBMUCOSAL, ANY: Performed by: INTERNAL MEDICINE

## 2019-06-10 PROCEDURE — D9220A PRA ANESTHESIA: ICD-10-PCS | Mod: ANES,,, | Performed by: ANESTHESIOLOGY

## 2019-06-10 PROCEDURE — 27200967 HC COAGRASPER: Performed by: INTERNAL MEDICINE

## 2019-06-10 PROCEDURE — 88305 TISSUE SPECIMEN TO PATHOLOGY - SURGERY: ICD-10-PCS | Mod: 26,,, | Performed by: PATHOLOGY

## 2019-06-10 PROCEDURE — 27201089 HC SNARE, DISP (ANY): Performed by: INTERNAL MEDICINE

## 2019-06-10 PROCEDURE — 63600175 PHARM REV CODE 636 W HCPCS: Performed by: NURSE ANESTHETIST, CERTIFIED REGISTERED

## 2019-06-10 PROCEDURE — 25000003 PHARM REV CODE 250: Performed by: NURSE ANESTHETIST, CERTIFIED REGISTERED

## 2019-06-10 PROCEDURE — 88305 TISSUE EXAM BY PATHOLOGIST: CPT | Mod: 59 | Performed by: PATHOLOGY

## 2019-06-10 PROCEDURE — 43254 EGD ENDO MUCOSAL RESECTION: CPT | Mod: ,,, | Performed by: INTERNAL MEDICINE

## 2019-06-10 RX ORDER — LIDOCAINE HCL/PF 100 MG/5ML
SYRINGE (ML) INTRAVENOUS
Status: DISCONTINUED | OUTPATIENT
Start: 2019-06-10 | End: 2019-06-10

## 2019-06-10 RX ORDER — SODIUM CHLORIDE 9 MG/ML
INJECTION, SOLUTION INTRAVENOUS CONTINUOUS
Status: DISCONTINUED | OUTPATIENT
Start: 2019-06-10 | End: 2019-06-10 | Stop reason: HOSPADM

## 2019-06-10 RX ORDER — PROPOFOL 10 MG/ML
VIAL (ML) INTRAVENOUS
Status: DISCONTINUED | OUTPATIENT
Start: 2019-06-10 | End: 2019-06-10

## 2019-06-10 RX ORDER — PROPOFOL 10 MG/ML
VIAL (ML) INTRAVENOUS CONTINUOUS PRN
Status: DISCONTINUED | OUTPATIENT
Start: 2019-06-10 | End: 2019-06-10

## 2019-06-10 RX ORDER — HYDROCHLOROTHIAZIDE 25 MG/1
25 TABLET ORAL DAILY
COMMUNITY
End: 2019-10-01 | Stop reason: SDUPTHER

## 2019-06-10 RX ORDER — SODIUM CHLORIDE 0.9 % (FLUSH) 0.9 %
10 SYRINGE (ML) INJECTION
Status: DISCONTINUED | OUTPATIENT
Start: 2019-06-10 | End: 2019-06-10 | Stop reason: HOSPADM

## 2019-06-10 RX ORDER — GLYCOPYRROLATE 0.2 MG/ML
INJECTION INTRAMUSCULAR; INTRAVENOUS
Status: DISCONTINUED | OUTPATIENT
Start: 2019-06-10 | End: 2019-06-10

## 2019-06-10 RX ORDER — KETAMINE HCL IN 0.9 % NACL 50 MG/5 ML
SYRINGE (ML) INTRAVENOUS
Status: DISCONTINUED | OUTPATIENT
Start: 2019-06-10 | End: 2019-06-10

## 2019-06-10 RX ORDER — ONDANSETRON 2 MG/ML
4 INJECTION INTRAMUSCULAR; INTRAVENOUS ONCE AS NEEDED
Status: DISCONTINUED | OUTPATIENT
Start: 2019-06-10 | End: 2019-06-10 | Stop reason: HOSPADM

## 2019-06-10 RX ORDER — FENTANYL CITRATE 50 UG/ML
INJECTION, SOLUTION INTRAMUSCULAR; INTRAVENOUS
Status: DISCONTINUED | OUTPATIENT
Start: 2019-06-10 | End: 2019-06-10

## 2019-06-10 RX ADMIN — LIDOCAINE HYDROCHLORIDE 100 MG: 20 INJECTION, SOLUTION INTRAVENOUS at 01:06

## 2019-06-10 RX ADMIN — SODIUM CHLORIDE: 0.9 INJECTION, SOLUTION INTRAVENOUS at 12:06

## 2019-06-10 RX ADMIN — Medication 20 MG: at 01:06

## 2019-06-10 RX ADMIN — FENTANYL CITRATE 25 MCG: 50 INJECTION, SOLUTION INTRAMUSCULAR; INTRAVENOUS at 01:06

## 2019-06-10 RX ADMIN — PROPOFOL 60 MG: 10 INJECTION, EMULSION INTRAVENOUS at 01:06

## 2019-06-10 RX ADMIN — PROPOFOL 150 MCG/KG/MIN: 10 INJECTION, EMULSION INTRAVENOUS at 01:06

## 2019-06-10 RX ADMIN — GLYCOPYRROLATE 0.2 MG: 0.2 INJECTION, SOLUTION INTRAMUSCULAR; INTRAVENOUS at 01:06

## 2019-06-10 NOTE — [employer] PATIENT INSTRUCTIONS
Discharge Summary/Instructions after an Endoscopic Procedure  Patient Name: Massimo Funez  Patient MRN: [id number]  Patient YOB: 1960  Monday, Elaine 10, 2019  Fermin Coley MD  RESTRICTIONS:  During your procedure today, you received medications for sedation.  These   medications may affect your judgment, balance and coordination.  Therefore,   for 24 hours, you have the following restrictions:   - DO NOT drive a car, operate machinery, make legal/financial decisions,   sign important papers or drink alcohol.    ACTIVITY:  Today: no heavy lifting, straining or running due to procedural   sedation/anesthesia.  The following day: return to full activity including work.  DIET:  Eat and drink normally unless instructed otherwise.     TREATMENT FOR COMMON SIDE EFFECTS:  - Mild abdominal pain, nausea, belching, bloating or excessive gas:  rest,   eat lightly and use a heating pad.  - Sore Throat: treat with throat lozenges and/or gargle with warm salt   water.  - Because air was used during the procedure, expelling large amounts of air   from your rectum or belching is normal.  - If a bowel prep was taken, you may not have a bowel movement for 1-3 days.    This is normal.  SYMPTOMS TO WATCH FOR AND REPORT TO YOUR PHYSICIAN:  1. Abdominal pain or bloating, other than gas cramps.  2. Chest pain.  3. Back pain.  4. Signs of infection such as: chills or fever occurring within 24 hours   after the procedure.  5. Rectal bleeding, which would show as bright red, maroon, or black stools.   (A tablespoon of blood from the rectum is not serious, especially if   hemorrhoids are present.)  6. Vomiting.  7. Weakness or dizziness.  GO DIRECTLY TO THE NEAREST EMERGENCY ROOM IF YOU HAVE ANY OF THE FOLLOWING:      Difficulty breathing              Chills and/or fever over 101 F   Persistent vomiting and/or vomiting blood   Severe abdominal pain   Severe chest pain   Black, tarry stools   Bleeding- more than one  tablespoon   Any other symptom or condition that you feel may need urgent attention  Your doctor recommends these additional instructions:  If any biopsies were taken, your doctors clinic will contact you in 1 to 2   weeks with any results.  - Discharge patient to home.   - Full liquid diet for 2 days.   - Continue present medications.   - Await pathology results.   - Refer to NET clinic at [place] for further management.  For questions, problems or results please call your physician - Fermin Coley MD at Work:  [phone].  [facility], EMERGENCY ROOM PHONE NUMBER: [phone]  IF A COMPLICATION OR EMERGENCY SITUATION ARISES AND YOU ARE UNABLE TO REACH   YOUR PHYSICIAN - GO DIRECTLY TO THE EMERGENCY ROOM.  Fermin Coley MD  6/10/2019 2:36:28 PM  This report has been verified and signed electronically.  [employer]

## 2019-06-10 NOTE — TRANSFER OF CARE
"Anesthesia Transfer of Care Note    Patient: Massimo Funez    Procedure(s) Performed: Procedure(s) (LRB):  EGD (ESOPHAGOGASTRODUODENOSCOPY)/emr (N/A)    Patient location: PACU    Anesthesia Type: general    Transport from OR: Transported from OR on 2-3 L/min O2 by NC with adequate spontaneous ventilation    Post pain: adequate analgesia    Post assessment: tolerated procedure well and no apparent anesthetic complications    Post vital signs: stable    Level of consciousness: sedated    Nausea/Vomiting: no nausea/vomiting    Complications: none    Transfer of care protocol was followed      Last vitals:   Visit Vitals  /60 (BP Location: Left arm, Patient Position: Lying)   Pulse 76   Temp 36.7 °C (98 °F) (Temporal)   Resp 12   Ht 5' 10" (1.778 m)   Wt 100.7 kg (222 lb)   SpO2 100%   BMI 31.85 kg/m²     "

## 2019-06-10 NOTE — H&P
History & Physical - Short Stay  Gastroenterology      SUBJECTIVE:     Procedure: EGD    Chief Complaint/Indication for Procedure: duodenal carcinoid    History of Present Illness:  Patient is a 58 y.o. male with duodenal carcinoid coming for EMR.     PTA Medications   Medication Sig    amlodipine-atorvastatin (CADUET) 10-40 mg per tablet Take 1 tablet by mouth once daily.    cholecalciferol, vitamin D3, (VITAMIN D3) 1,000 unit capsule Take 1 capsule (1,000 Units total) by mouth once daily.    hydroCHLOROthiazide (HYDRODIURIL) 25 MG tablet Take 25 mg by mouth once daily.    hydroxychloroquine (PLAQUENIL) 200 mg tablet Take 1 tablet (200 mg total) by mouth 2 (two) times daily.    omeprazole (PRILOSEC) 40 MG capsule Take 1 capsule (40 mg total) by mouth once daily.    predniSONE (DELTASONE) 10 MG tablet Take 10 mg by mouth once daily.     predniSONE (DELTASONE) 2.5 MG tablet Take 3 tablets (7.5 mg total) by mouth once daily.    BOOSTRIX TDAP 2.5-8-5 Lf-mcg-Lf/0.5mL Syrg injection     docusate sodium (COLACE) 100 MG capsule Take 1 capsule (100 mg total) by mouth 2 (two) times daily.    fish oil-omega-3 fatty acids 300-1,000 mg capsule Take 2 g by mouth once daily.    furosemide (LASIX) 20 MG tablet Take 20 mg by mouth as needed.     gabapentin (NEURONTIN) 100 MG capsule Take 1 capsule (100 mg total) by mouth nightly as needed (numbness and tingling).    mupirocin calcium 2% (BACTROBAN) 2 % cream Apply topically 2 (two) times daily.    promethazine (PHENERGAN) 25 MG tablet Take 1 tablet (25 mg total) by mouth every 6 (six) hours as needed for Nausea.    sildenafil (VIAGRA) 50 MG tablet Take 50 mg by mouth.       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    Rheumatoid arthritis     Rheumatoid arthritis involving multiple sites with positive rheumatoid factor 4/16/2018    Syphilis      Past Surgical History:   Procedure Laterality Date    LIVER BIOPSY      ULTRASOUND, UPPER GI TRACT, ENDOSCOPIC N/A 4/22/2019    Performed by Fermin Coley MD at [facility] (2ND FLR)     Family History   Problem Relation Age of Onset    Diabetes Mellitus Mother     Inflammatory bowel disease Mother     Osteoarthritis Mother     Hypertension Father     Stroke Father     Cancer Neg Hx     Heart disease Neg Hx     Hyperlipidemia Neg Hx     Lupus Neg Hx     Kidney disease Neg Hx     Thyroid disease Neg Hx     Rheum arthritis Neg Hx     Psoriasis Neg Hx     Cirrhosis Neg Hx      Social History     Tobacco Use    Smoking status: Former Smoker    Smokeless tobacco: Never Used   Substance Use Topics    Alcohol use: No     Frequency: Never    Drug use: No          OBJECTIVE:     Vital Signs (Most Recent)  Temp: 98.2 °F (36.8 °C) (06/10/19 1215)  Pulse: 72 (06/10/19 1215)  Resp: 16 (06/10/19 1215)  BP: (!) 140/73 (06/10/19 1215)  SpO2: 100 % (06/10/19 1215)         ASSESSMENT/PLAN:     Patient is a 58 y.o. male with duodenal carcinoid coming for EMR.    Plan: EGD    Anesthesia Plan: Moderate Sedation    ASA Grade: ASA 2 - Patient with mild systemic disease with no functional limitations

## 2019-06-10 NOTE — PLAN OF CARE
Patient tolerated procedure and VSS.  Patient denies complaint of pain and nausea.  Patient tolerating PO.  Patient and spouse given discharge instructions and verbalize understanding.

## 2019-06-10 NOTE — DISCHARGE INSTRUCTIONS
Upper GI Endoscopy     During endoscopy, a long, flexible tube is used to view the inside of your upper GI tract.      Upper GI endoscopy allows your healthcare provider to look directly into the beginning of your gastrointestinal (GI) tract. The esophagus, stomach, and duodenum (the first part of the small intestine) make up the upper GI tract.   Before the exam  Follow these and any other instructions you are given before your endoscopy. If you dont follow the healthcare providers instructions carefully, the test may need to be canceled or done over:  · Don't eat or drink anything after midnight the night before your exam. If your exam is in the afternoon, drink only clear liquids in the morning. Don't eat or drink anything for 8 hours before the exam. In some cases, you may be able to take medicines with sips of water until 2 hours before the procedure. Speak with your healthcare provider about this.   · Bring your X-rays and any other test results you have.  · Because you will be sedated, arrange for an adult to drive you home after the exam.  · Tell your healthcare provider before the exam if you are taking any medicines or have any medical problems.  The procedure  Here is what to expect:  · You will lie on the endoscopy table. Usually patients lie on the left side.  · You will be monitored and given oxygen.  · Your throat may be numbed with a spray or gargle. You are given medicine through an intravenous (IV) line that will help you relax and remain comfortable. You may be awake or asleep during the procedure.  · The healthcare provider will put the endoscope in your mouth and down your esophagus. It is thinner than most pieces of food that you swallow. It will not affect your breathing. The medicine helps keep you from gagging.  · Air is put into your GI tract to expand it. It can make you burp.  · During the procedure, the healthcare provider can take biopsies (tissue samples), remove abnormalities,  such as polyps, or treat abnormalities through a variety of devices placed through the endoscope. You will not feel this.   · The endoscope carries images of your upper GI tract to a video screen. If you are awake, you may be able to look at the images.  · After the procedure is done, you will rest for a time. An adult must drive you home.  When to call your healthcare provider  Contact your healthcare provider if you have:  · Black or tarry stools, or blood in your stool  · Fever  · Pain in your belly that does not go away  · Nausea and vomiting, or vomiting blood   Date Last Reviewed: 7/1/2016 © 2000-2017 [employer]. [address] [place] [address], [place] [address]. All rights reserved. This information is not intended as a substitute for professional medical care. Always follow your healthcare professional's instructions.      Anesthesia: General Anesthesia     You are watched continuously during your procedure by your anesthesia provider.     Youre due to have surgery. During surgery, youll be given medicine called anesthesia or anesthetic. This will keep you comfortable and pain-free. Your anesthesia provider will use general anesthesia.  What is general anesthesia?  General anesthesia puts you into a state like deep sleep. It goes into the bloodstream (IV anesthetics), into the lungs (gas anesthetics), or both. You feel nothing during the procedure. You will not remember it. During the procedure, the anesthesia provider monitors you continuously. He or she checks your heart rate and rhythm, blood pressure, breathing, and blood oxygen.  · IV anesthetics. IV anesthetics are given through an IV line in your arm. Theyre often given first. This is so you are asleep before a gas anesthetic is started. Some kinds of IV anesthetics relieve pain. Others relax you. Your doctor will decide which kind is best in your case.  · Gas anesthetics. Gas anesthetics are breathed into the lungs. They are often used to  keep you asleep. They can be given through a facemask or a tube placed in your larynx or trachea (breathing tube).  ¨ If you have a facemask, your anesthesia provider will most likely place it over your nose and mouth while youre still awake. Youll breathe oxygen through the mask as your IV anesthetic is started. Gas anesthetic may be added through the mask.  ¨ If you have a tube in the larynx or trachea, it will be inserted into your throat after youre asleep.  Anesthesia tools and medicines  You will likely have:  · IV anesthetics. These are put into an IV line into your bloodstream.  · Gas anesthetics. You breathe these anesthetics into your lungs, where they pass into your bloodstream.  · Pulse oximeter. This is a small clip that is attached to the end of your finger. This measures your blood oxygen level.  · Electrocardiography leads (electrodes). These are small sticky pads that are placed on your chest. They record your heart rate and rhythm.  · Blood pressure cuff. This reads your blood pressure.  Risks and possible complications  General anesthesia has some risks. These include:  · Breathing problems  · Nausea and vomiting  · Sore throat or hoarseness (usually temporary)  · Allergic reaction to the anesthetic  · Irregular heartbeat (rare)  · Cardiac arrest (rare)   Anesthesia safety  · Follow all instructions you are given for how long not to eat or drink before your procedure.  · Be sure your doctor knows what medicines and drugs you take. This includes over-the-counter medicines, herbs, supplements, alcohol or other drugs. You will be asked when those were last taken.  · Have an adult family member or friend drive you home after the procedure.  · For the first 24 hours after your surgery:  ¨ Do not drive or use heavy equipment.  ¨ Do not make important decisions or sign legal documents. If important decisions or signing legal documents is necessary during the first 24 hours after surgery, have a  trusted family member or spouse act on your behalf.  ¨ Avoid alcohol.  ¨ Have a responsible adult stay with you. He or she can watch for problems and help keep you safe.  Date Last Reviewed: 12/1/2016  © 1511-4862 [employer]. [address] [place] [address], [place] [address]. All rights reserved. This information is not intended as a substitute for professional medical care. Always follow your healthcare professional's instructions.

## 2019-06-10 NOTE — ANESTHESIA PREPROCEDURE EVALUATION
06/10/2019  Massimo Funez is a 58 y.o., male   Pre-operative evaluation for Procedure(s) (LRB):  EGD (ESOPHAGOGASTRODUODENOSCOPY)/emr (N/A)    Massimo Funez is a 58 y.o. male     Patient Active Problem List   Diagnosis    Rheumatoid arthritis involving multiple sites with positive rheumatoid factor    Latent syphilis    Hypertension    Bilateral knee pain    Osteoarthritis    Hyperlipidemia    Elevated liver enzymes    Fatty liver    History of methotrexate therapy    Positive hepatitis C antibody test    Elevated amylase and lipase    Hypercalcemia    ALBARADO (nonalcoholic steatohepatitis)    Nephrolithiasis    Syncope due to orthostatic hypotension    Iron deficiency anemia due to chronic blood loss    Primary malignant neuroendocrine tumor of duodenum    Duodenal carcinoid syndrome       Review of patient's allergies indicates:  No Known Allergies    No current facility-administered medications on file prior to encounter.      Current Outpatient Medications on File Prior to Encounter   Medication Sig Dispense Refill    cholecalciferol, vitamin D3, (VITAMIN D3) 1,000 unit capsule Take 1 capsule (1,000 Units total) by mouth once daily. 30 capsule 11    hydroCHLOROthiazide (HYDRODIURIL) 25 MG tablet Take 25 mg by mouth once daily.      omeprazole (PRILOSEC) 40 MG capsule Take 1 capsule (40 mg total) by mouth once daily. 30 capsule 11    predniSONE (DELTASONE) 10 MG tablet Take 10 mg by mouth once daily.       BOOSTRIX TDAP 2.5-8-5 Lf-mcg-Lf/0.5mL Syrg injection       docusate sodium (COLACE) 100 MG capsule Take 1 capsule (100 mg total) by mouth 2 (two) times daily. 60 capsule 0    fish oil-omega-3 fatty acids 300-1,000 mg capsule Take 2 g by mouth once daily.      furosemide (LASIX) 20 MG tablet Take 20 mg by mouth as needed.       gabapentin (NEURONTIN) 100 MG  capsule Take 1 capsule (100 mg total) by mouth nightly as needed (numbness and tingling). 30 capsule 2    mupirocin calcium 2% (BACTROBAN) 2 % cream Apply topically 2 (two) times daily. 30 g 0    promethazine (PHENERGAN) 25 MG tablet Take 1 tablet (25 mg total) by mouth every 6 (six) hours as needed for Nausea. 20 tablet 0    sildenafil (VIAGRA) 50 MG tablet Take 50 mg by mouth.         Past Surgical History:   Procedure Laterality Date    LIVER BIOPSY      ULTRASOUND, UPPER GI TRACT, ENDOSCOPIC N/A 4/22/2019    Performed by Fermin Coley MD at [facility] (2ND FLR)       Social History     Socioeconomic History    Marital status:      Spouse name: Not on file    Number of children: Not on file    Years of education: Not on file    Highest education level: Not on file   Occupational History    Not on file   Social Needs    Financial resource strain: Not on file    Food insecurity:     Worry: Not on file     Inability: Not on file    Transportation needs:     Medical: Not on file     Non-medical: Not on file   Tobacco Use    Smoking status: Former Smoker    Smokeless tobacco: Never Used   Substance and Sexual Activity    Alcohol use: No     Frequency: Never    Drug use: No    Sexual activity: Yes   Lifestyle    Physical activity:     Days per week: Not on file     Minutes per session: Not on file    Stress: Not on file   Relationships    Social connections:     Talks on phone: Not on file     Gets together: Not on file     Attends Yarsanism service: Not on file     Active member of club or organization: Not on file     Attends meetings of clubs or organizations: Not on file     Relationship status: Not on file   Other Topics Concern    Not on file   Social History Narrative    Not on file   Body mass index is 31.85 kg/m².      Anesthesia Evaluation    I have reviewed the Patient Summary Reports.     I have reviewed the Medications.     Review of Systems  Anesthesia Hx:  No problems  with previous Anesthesia Denies Hx of Anesthetic complications  History of prior surgery of interest to airway management or planning: Denies Family Hx of Anesthesia complications.   Denies Personal Hx of Anesthesia complications.   Social:  No Alcohol Use, Former Smoker    Hematology/Oncology:  Hematology Normal   Oncology Normal     EENT/Dental:EENT/Dental Normal   Cardiovascular:   Exercise tolerance: good Hypertension hyperlipidemia    Pulmonary:  Pulmonary Normal    Renal/:   renal calculi    Hepatic/GI:   Liver Disease, Hepatitis, C Duodenal tumor   Musculoskeletal:   Arthritis     Neurological:  Neurology Normal    Endocrine:  Endocrine Normal    Psych:  Psychiatric Normal           Physical Exam  General:  Well nourished    Airway/Jaw/Neck:  Airway Findings: Mouth Opening: Normal Tongue: Normal  General Airway Assessment: Adult, Average  Mallampati: III  TM Distance: Normal, at least 6 cm  Jaw/Neck Findings:  Neck ROM: Normal ROM      Dental:  Dental Findings: In tact   Chest/Lungs:  Chest/Lungs Findings: Normal Respiratory Rate, Clear to auscultation     Heart/Vascular:  Heart Findings: Rate: Normal  Rhythm: Regular Rhythm        Mental Status:  Mental Status Findings:  Alert and Oriented, Cooperative         Anesthesia Plan  Type of Anesthesia, risks & benefits discussed:  Anesthesia Type:  general  Patient's Preference:   Intra-op Monitoring Plan: standard ASA monitors  Intra-op Monitoring Plan Comments:   Post Op Pain Control Plan: multimodal analgesia, IV/PO Opioids PRN and per primary service following discharge from PACU  Post Op Pain Control Plan Comments:   Induction:   IV  Beta Blocker:  Patient is not currently on a Beta-Blocker (No further documentation required).       Informed Consent: Patient understands risks and agrees with Anesthesia plan.  Questions answered. Anesthesia consent signed with patient.  ASA Score: 3     Day of Surgery Review of History & Physical:    H&P update referred to  the provider.         Ready For Surgery From Anesthesia Perspective.

## 2019-06-11 NOTE — ANESTHESIA POSTPROCEDURE EVALUATION
Anesthesia Post Evaluation    Patient: Massimo Funez    Procedure(s) Performed: Procedure(s) (LRB):  EGD (ESOPHAGOGASTRODUODENOSCOPY)/emr (N/A)    Final Anesthesia Type: general  Patient location during evaluation: [facility]  Patient participation: Yes- Able to Participate  Level of consciousness: awake and alert and oriented  Post-procedure vital signs: reviewed and stable  Pain management: adequate  Airway patency: patent  PONV status at discharge: No PONV  Anesthetic complications: no      Cardiovascular status: blood pressure returned to baseline and hemodynamically stable  Respiratory status: unassisted, spontaneous ventilation and room air  Hydration status: euvolemic  Follow-up not needed.          Vitals Value Taken Time   /68 6/10/2019  3:32 PM   Temp 36.7 °C (98 °F) 6/10/2019  2:30 PM   Pulse 50 6/10/2019  3:35 PM   Resp 20 6/10/2019  3:35 PM   SpO2 100 % 6/10/2019  3:35 PM   Vitals shown include unvalidated device data.      No case tracking events are documented in the log.      Pain/Surinder Score: Surinder Score: 10 (6/10/2019  3:42 PM)

## 2019-06-12 ENCOUNTER — CLINICAL SUPPORT (OUTPATIENT)
Dept: INFECTIOUS DISEASES | Facility: CLINIC | Age: 59
End: 2019-06-12
Payer: MEDICARE

## 2019-06-12 DIAGNOSIS — M05.79 RHEUMATOID ARTHRITIS INVOLVING MULTIPLE SITES WITH POSITIVE RHEUMATOID FACTOR: ICD-10-CM

## 2019-06-12 DIAGNOSIS — D84.9 IMMUNOCOMPROMISED, ACQUIRED: ICD-10-CM

## 2019-06-12 PROCEDURE — 90471 IMMUNIZATION ADMIN: CPT | Mod: PBBFAC

## 2019-06-24 ENCOUNTER — TELEPHONE (OUTPATIENT)
Dept: NEUROLOGY | Facility: HOSPITAL | Age: 59
End: 2019-06-24

## 2019-06-24 DIAGNOSIS — D49.89 NEOPLASM OF ABDOMEN: ICD-10-CM

## 2019-06-28 ENCOUNTER — TELEPHONE (OUTPATIENT)
Dept: NEUROLOGY | Facility: HOSPITAL | Age: 59
End: 2019-06-28

## 2019-06-28 DIAGNOSIS — D49.89 NEOPLASM OF ABDOMEN: ICD-10-CM

## 2019-06-28 DIAGNOSIS — C7A.010 MALIGNANT CARCINOID TUMOR OF THE DUODENUM: Primary | ICD-10-CM

## 2019-06-28 NOTE — TELEPHONE ENCOUNTER
Have called multiple times over multiple days but the phone is busy and the emergency contact does not answer and no voicemail. Trying to set up a [facility] patient.

## 2019-07-01 ENCOUNTER — TELEPHONE (OUTPATIENT)
Dept: NEUROLOGY | Facility: HOSPITAL | Age: 59
End: 2019-07-01

## 2019-07-01 NOTE — TELEPHONE ENCOUNTER
----- Message from Andreia Ellison MA sent at 6/28/2019  5:07 PM CDT -----  I spoke with patient.  ----- Message -----  From: Staci Waters  Sent: 6/28/2019   3:35 PM  To: Andreia Ellison MA    I have been calling for days and it is always busy and the emergency contact just rings and rings. Can you help? He is scheduled but I need him to know the day and time.  ----- Message -----  From: Andreia Ellison MA  Sent: 6/13/2019   2:22 PM  To: Genny Briseno, RN, Staci Waters, [address] is referring patient to [facility].  Please let me know if you need my assistance.  Anabell

## 2019-07-01 NOTE — TELEPHONE ENCOUNTER
I had been having a hard time getting in touch with this patient so I reached out to the referring physician and they were able to get in touch with the patient who is scheduled to come as a [facility] patient. Patient verbalized understanding.

## 2019-07-22 ENCOUNTER — TELEPHONE (OUTPATIENT)
Dept: NEUROLOGY | Facility: HOSPITAL | Age: 59
End: 2019-07-22

## 2019-07-22 ENCOUNTER — PATIENT MESSAGE (OUTPATIENT)
Dept: NEUROLOGY | Facility: HOSPITAL | Age: 59
End: 2019-07-22
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
## 2019-07-22 NOTE — TELEPHONE ENCOUNTER
----- Message from Amira Briscoe RN sent at 7/22/2019 12:52 PM CDT -----  Regarding: Сергей Roa,  Do you know why this patient cancelled his gallium and appt with Dr. Solo? Do I need to call him back?    ----- Message -----  From: Staci Waters  Sent: 7/8/2019   8:38 AM  To: Andreia Ellison MA, Genny Briseno RN, #    He has appointments on 7/15.  ----- Message -----  From: Fermin Coley MD  Sent: 7/8/2019   8:16 AM  To: Andreia Ellison MA, Genny Briseno RN, Staci Waters, #    Is he seeing [facility] ?    ----- Message -----  From: Andreia Ellison MA  Sent: 6/21/2019   9:26 AM  To: Genny Briseno RN, Staci Waters, #    Any word on an appointment for him?

## 2019-08-03 ENCOUNTER — PATIENT MESSAGE (OUTPATIENT)
Dept: ENDOSCOPY | Facility: HOSPITAL | Age: 59
End: 2019-08-03

## 2019-08-05 ENCOUNTER — OFFICE VISIT (OUTPATIENT)
Dept: NEUROLOGY | Facility: HOSPITAL | Age: 59
End: 2019-08-05
Attending: SURGERY
Payer: MEDICARE

## 2019-08-05 ENCOUNTER — HOSPITAL ENCOUNTER (OUTPATIENT)
Dept: RADIOLOGY | Facility: HOSPITAL | Age: 59
Discharge: HOME | End: 2019-08-05
Attending: SURGERY
Payer: MEDICARE

## 2019-08-05 VITALS
WEIGHT: 209.69 LBS | BODY MASS INDEX: 31.06 KG/M2 | DIASTOLIC BLOOD PRESSURE: 78 MMHG | SYSTOLIC BLOOD PRESSURE: 131 MMHG | TEMPERATURE: 98 F | HEIGHT: 69 IN | HEART RATE: 66 BPM

## 2019-08-05 DIAGNOSIS — C7A.010 MALIGNANT CARCINOID TUMOR OF THE DUODENUM: ICD-10-CM

## 2019-08-05 DIAGNOSIS — C7A.010 MALIGNANT CARCINOID TUMOR OF DUODENUM: Primary | ICD-10-CM

## 2019-08-05 DIAGNOSIS — K21.9 GASTROESOPHAGEAL REFLUX DISEASE, ESOPHAGITIS PRESENCE NOT SPECIFIED: ICD-10-CM

## 2019-08-05 LAB
EXT 24 HR UR METANEPHRINE: NORMAL
EXT 24 HR UR NORMETANEPHRINE: NORMAL
EXT 24 HR UR NORMETANEPHRINE: NORMAL
EXT 25 HYDROXY VIT D2: NORMAL
EXT 25 HYDROXY VIT D3: NORMAL
EXT 5 HIAA 24 HR URINE: NORMAL
EXT 5 HIAA BLOOD: 8 NG/ML (ref 0–22)
EXT ACTH: NORMAL
EXT AFP: NORMAL
EXT ALBUMIN: NORMAL
EXT ALKALINE PHOSPHATASE: NORMAL
EXT ALT: NORMAL
EXT AMYLASE: NORMAL
EXT ANTI ISLET CELL AB: NORMAL
EXT ANTI PARIETAL CELL AB: NORMAL
EXT ANTI THYROID AB: NORMAL
EXT AST: NORMAL
EXT BILIRUBIN DIRECT: NORMAL MG/DL
EXT BILIRUBIN TOTAL: NORMAL
EXT BK VIRUS DNA QN PCR: NORMAL
EXT BUN: NORMAL
EXT C PEPTIDE: NORMAL
EXT CA 125: NORMAL
EXT CA 19-9: NORMAL
EXT CA 27-29: NORMAL
EXT CALCITONIN: NORMAL
EXT CALCIUM: NORMAL
EXT CEA: NORMAL
EXT CHLORIDE: NORMAL
EXT CHOLESTEROL: NORMAL
EXT CHROMOGRANIN A: NORMAL
EXT CO2: NORMAL
EXT CREATININE UA: NORMAL
EXT CREATININE: NORMAL MG/DL
EXT CYCLOSPORONE LEVEL: NORMAL
EXT DOPAMINE: NORMAL
EXT EBV DNA BY PCR: NORMAL
EXT EPINEPHRINE: NORMAL
EXT FOLATE: NORMAL
EXT FREE T3: NORMAL
EXT FREE T4: NORMAL
EXT FSH: NORMAL
EXT GASTRIN RELEASING PEPTIDE: NORMAL
EXT GASTRIN RELEASING PEPTIDE: NORMAL
EXT GASTRIN: NORMAL
EXT GGT: NORMAL
EXT GHRELIN: NORMAL
EXT GLUCAGON: NORMAL
EXT GLUCOSE: NORMAL
EXT GROWTH HORMONE: NORMAL
EXT HCV RNA QUANT PCR: NORMAL
EXT HDL: NORMAL
EXT HEMATOCRIT: NORMAL
EXT HEMOGLOBIN A1C: NORMAL %
EXT HEMOGLOBIN: NORMAL
EXT HISTAMINE 24 HR URINE: NORMAL
EXT HISTAMINE: NORMAL
EXT IGF-1: NORMAL
EXT IMMUNKNOW (NON-STIMULATED): NORMAL
EXT IMMUNKNOW (STIMULATED): NORMAL
EXT INR: NORMAL
EXT INSULIN: NORMAL
EXT LANREOTIDE LEVEL: NORMAL
EXT LDH, TOTAL: NORMAL
EXT LDL CHOLESTEROL: NORMAL
EXT LIPASE: NORMAL
EXT MAGNESIUM: NORMAL
EXT METANEPHRINE FREE PLASMA: NORMAL
EXT MOTILIN: NORMAL
EXT NEUROKININ A CAMB: NORMAL
EXT NEUROKININ A ISI: NORMAL PG/ML (ref 0–40)
EXT NEUROTENSIN: NORMAL
EXT NOREPINEPHRINE: NORMAL
EXT NORMETANEPHRINE: NORMAL
EXT NSE: NORMAL
EXT OCTREOTIDE LEVEL: NORMAL
EXT PANCREASTATIN CAMB: NORMAL
EXT PANCREASTATIN ISI: 43 PG/ML (ref 10–135)
EXT PANCREATIC POLYPEPTIDE: NORMAL
EXT PHOSPHORUS: NORMAL
EXT PLATELETS: NORMAL
EXT POTASSIUM: NORMAL
EXT PROGRAF LEVEL: NORMAL
EXT PROLACTIN: NORMAL
EXT PROTEIN TOTAL: NORMAL
EXT PROTEIN UA: NORMAL
EXT PT: NORMAL
EXT PTH, INTACT: NORMAL
EXT PTT: NORMAL
EXT RAPAMUNE LEVEL: NORMAL
EXT SEROTONIN: 114 NG/ML (ref 56–244)
EXT SODIUM: NORMAL MMOL/L
EXT SOMATOSTATIN: NORMAL
EXT SUBSTANCE P: NORMAL
EXT TRIGLYCERIDES: NORMAL
EXT TRYPTASE: NORMAL
EXT TSH: NORMAL
EXT URIC ACID: NORMAL
EXT URINE AMYLASE U/HR: NORMAL
EXT URINE AMYLASE U/L: NORMAL
EXT VASOACTIVE INTESTINAL POLYPEPTIDE: NORMAL
EXT VITAMIN B12: NORMAL
EXT VMA 24 HR URINE: NORMAL
EXT WBC: NORMAL
NEURON SPECIFIC ENOLASE: NORMAL

## 2019-08-05 PROCEDURE — 99213 OFFICE O/P EST LOW 20 MIN: CPT | Mod: 25 | Performed by: SURGERY

## 2019-08-05 PROCEDURE — A9587 GALLIUM GA-68: HCPCS | Mod: TB

## 2019-08-05 PROCEDURE — 78815 PET IMAGE W/CT SKULL-THIGH: CPT | Mod: TC,PI

## 2019-08-05 PROCEDURE — 78815 NM PET 68GA DOTATATE WHOLE BODY: ICD-10-PCS | Mod: 26,PS,, | Performed by: RADIOLOGY

## 2019-08-05 PROCEDURE — 78815 PET IMAGE W/CT SKULL-THIGH: CPT | Mod: 26,PS,, | Performed by: RADIOLOGY

## 2019-08-05 NOTE — PATIENT INSTRUCTIONS
CT scheduled on Monday, February 10, 2020 at 845am.    6 month clinic follow up scheduled on Monday, February 10, 2020 at  10am.    [facility] due to today, [facility], [address]    [facility] in 6 months, [facility] will contact to schedule.

## 2019-08-05 NOTE — PROGRESS NOTES
"NOLANETS:  [facility]  A collaboration between [facility] and [facility]      PATIENT: Massimo Funez  MRN: [id number]  DATE: 8/5/2019    Subjective:      Chief Complaint: No chief complaint on file.  in January feeling bad, went to ER for nausea, vomiting and diarrhea. 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 After that has not has any problems    No complaints now, eating well having normal BM  Diagnosed to fatty liver last year. Changed diet. Lost weight . Lost 80 lbs intentionally  Vitals:   Vitals:    08/05/19 1029   BP: 131/78   BP Location: Right arm   Patient Position: Sitting   BP Method: Medium (Automatic)   Pulse: 66   Temp: 97.9 °F (36.6 °C)   Weight: 95.1 kg (209 lb 10.5 oz)   Height: 5' 8.9" (1.75 m)        Karnofsky Score:     Diagnosis: No diagnosis found.     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    Rheumatoid arthritis     Rheumatoid arthritis involving multiple sites with positive rheumatoid factor 4/16/2018    Syphilis        Past Surgical History:  Past Surgical History:   Procedure Laterality Date    EGD (ESOPHAGOGASTRODUODENOSCOPY)/emr N/A 6/10/2019    Performed by Fermin Coley MD at [facility] (2ND FLR)    LIVER BIOPSY      ULTRASOUND, UPPER GI TRACT, ENDOSCOPIC N/A 4/22/2019    Performed by Fermin Coley MD at [facility] (2ND FLR)       Family History:  Family History   Problem Relation Age of Onset    Diabetes Mellitus Mother     Inflammatory bowel disease Mother     Osteoarthritis Mother     " Hypertension Father     Stroke Father     Cancer Neg Hx     Heart disease Neg Hx     Hyperlipidemia Neg Hx     Lupus Neg Hx     Kidney disease Neg Hx     Thyroid disease Neg Hx     Rheum arthritis Neg Hx     Psoriasis Neg Hx     Cirrhosis Neg Hx        Allergies:  Review of patient's allergies indicates:   Allergen Reactions    Epinephrine      RISK FOR CARCINOID CRISIS       Medications:  Current Outpatient Medications   Medication Sig Dispense Refill    amlodipine-atorvastatin (CADUET) 10-40 mg per tablet Take 1 tablet by mouth once daily. 90 tablet 3    cholecalciferol, vitamin D3, (VITAMIN D3) 1,000 unit capsule Take 1 capsule (1,000 Units total) by mouth once daily. 30 capsule 11    gabapentin (NEURONTIN) 100 MG capsule Take 1 capsule (100 mg total) by mouth nightly as needed (numbness and tingling). 30 capsule 2    hydroCHLOROthiazide (HYDRODIURIL) 25 MG tablet Take 25 mg by mouth once daily.      hydroxychloroquine (PLAQUENIL) 200 mg tablet Take 1 tablet (200 mg total) by mouth 2 (two) times daily. 60 tablet 11    mupirocin calcium 2% (BACTROBAN) 2 % cream Apply topically 2 (two) times daily. 30 g 0    omeprazole (PRILOSEC) 40 MG capsule Take 1 capsule (40 mg total) by mouth once daily. 30 capsule 11    predniSONE (DELTASONE) 10 MG tablet Take 5 mg by mouth once daily.       promethazine (PHENERGAN) 25 MG tablet Take 1 tablet (25 mg total) by mouth every 6 (six) hours as needed for Nausea. 20 tablet 0    sildenafil (VIAGRA) 50 MG tablet Take 50 mg by mouth.      BOOSTRIX TDAP 2.5-8-5 Lf-mcg-Lf/0.5mL Syrg injection       docusate sodium (COLACE) 100 MG capsule Take 1 capsule (100 mg total) by mouth 2 (two) times daily. 60 capsule 0    fish oil-omega-3 fatty acids 300-1,000 mg capsule Take 2 g by mouth once daily.      furosemide (LASIX) 20 MG tablet Take 20 mg by mouth as needed.        No current facility-administered medications for this visit.        Review of Systems    Constitutional: Negative for appetite change, chills, diaphoresis, fatigue, fever and unexpected weight change.   HENT: Negative for congestion, dental problem, ear discharge, ear pain, facial swelling, hearing loss, mouth sores, nosebleeds, postnasal drip, rhinorrhea, sinus pressure, sinus pain, sore throat, tinnitus, trouble swallowing and voice change.    Eyes: Negative for photophobia, pain, discharge, redness, itching and visual disturbance.   Respiratory: Negative for cough, choking, chest tightness, wheezing and stridor.    Cardiovascular: Negative for chest pain, palpitations and leg swelling.   Gastrointestinal: Positive for constipation. Negative for abdominal distention, abdominal pain, anal bleeding, blood in stool, diarrhea, nausea, rectal pain and vomiting.   Endocrine: Negative.    Musculoskeletal: Positive for arthralgias and joint swelling. Negative for gait problem and neck stiffness.   Neurological: Negative for syncope, facial asymmetry, speech difficulty, weakness, light-headedness and headaches.   Hematological: Negative for adenopathy. Does not bruise/bleed easily.   Psychiatric/Behavioral: Negative for behavioral problems and dysphoric mood.      Objective:      Physical Exam   Constitutional: He is oriented to person, place, and time. He appears well-developed and well-nourished. No distress.   HENT:   Head: Normocephalic and atraumatic.   Right Ear: External ear normal.   Left Ear: External ear normal.   Eyes: Pupils are equal, round, and reactive to light. Conjunctivae and EOM are normal.   Neck: Normal range of motion.   Cardiovascular: Normal rate and regular rhythm.   Pulmonary/Chest: Effort normal and breath sounds normal.   Abdominal: Soft. Bowel sounds are normal. He exhibits no distension. There is no tenderness. There is no rebound and no guarding. No hernia.   Musculoskeletal: Normal range of motion.   Neurological: He is alert and oriented to person, place, and time.   Skin:  Skin is warm. He is not diaphoretic.   Psychiatric: He has a normal mood and affect. His behavior is normal.      Assessment:       No diagnosis found.    Laboratory Data:   Results for SANAZ GUTIERREZ (MRN [id number]) as of 8/5/2019 10:59   Ref. Range 4/22/2019 13:39 5/27/2019 10:21 6/10/2019 14:20 8/5/2019 09:29   WBC Latest Ref Range: 3.90 - 12.70 K/uL  5.90     RBC Latest Ref Range: 4.60 - 6.20 M/uL  4.49 (L)     Hemoglobin Latest Ref Range: 14.0 - 18.0 g/dL  13.7 (L)     Hematocrit Latest Ref Range: 40.0 - 54.0 %  40.6     MCV Latest Ref Range: 82 - 98 fL  90     MCH Latest Ref Range: 27.0 - 31.0 pg  30.5     MCHC Latest Ref Range: 32.0 - 36.0 g/dL  33.7     RDW Latest Ref Range: 11.5 - 14.5 %  13.3     Platelets Latest Ref Range: 150 - 350 K/uL  159     MPV Latest Ref Range: 9.2 - 12.9 fL  11.4     Gran% Latest Ref Range: 38.0 - 73.0 %  47.6     Gran # (ANC) Latest Ref Range: 1.8 - 7.7 K/uL  2.8     Lymph% Latest Ref Range: 18.0 - 48.0 %  45.8     Lymph # Latest Ref Range: 1.0 - 4.8 K/uL  2.7     Mono% Latest Ref Range: 4.0 - 15.0 %  5.6     Mono # Latest Ref Range: 0.3 - 1.0 K/uL  0.3     Eosinophil% Latest Ref Range: 0.0 - 8.0 %  0.3     Eos # Latest Ref Range: 0.0 - 0.5 K/uL  0.0     Basophil% Latest Ref Range: 0.0 - 1.9 %  0.5     Baso # Latest Ref Range: 0.00 - 0.20 K/uL  0.03     nRBC Latest Ref Range: 0 /100 WBC  0     Differential Method Unknown  Automated     Immature Grans (Abs) Latest Ref Range: 0.00 - 0.04 K/uL  0.01     Immature Granulocytes Latest Ref Range: 0.0 - 0.5 %  0.2     Sed Rate Latest Ref Range: 0 - 23 mm/Hr  12     Sodium Latest Ref Range: 136 - 145 mmol/L  144     Potassium Latest Ref Range: 3.5 - 5.1 mmol/L  3.6     Chloride Latest Ref Range: 95 - 110 mmol/L  107     CO2 Latest Ref Range: 23 - 29 mmol/L  28     Anion Gap Latest Ref Range: 8 - 16 mmol/L  9     BUN, Bld Latest Ref Range: 6 - 20 mg/dL  12     Creatinine Latest Ref Range: 0.5 - 1.4 mg/dL  0.9     eGFR if non African  American Latest Ref Range: >60 mL/min/1.73 m^2  >60.0     eGFR if African American Latest Ref Range: >60 mL/min/1.73 m^2  >60.0     Glucose Latest Ref Range: 70 - 110 mg/dL  86     Calcium Latest Ref Range: 8.7 - 10.5 mg/dL  10.4     Alkaline Phosphatase Latest Ref Range: 55 - 135 U/L  82     PROTEIN TOTAL Latest Ref Range: 6.0 - 8.4 g/dL  7.2     Albumin Latest Ref Range: 3.5 - 5.2 g/dL  3.8     BILIRUBIN TOTAL Latest Ref Range: 0.1 - 1.0 mg/dL  0.9     AST Latest Ref Range: 10 - 40 U/L  20     ALT Latest Ref Range: 10 - 44 U/L  21     CRP Latest Ref Range: 0.0 - 8.2 mg/L  5.7     US ENDOSCOPIC ULTRASOUND Unknown Rpt      TISSUE SPECIMEN TO PATHOLOGY - SURGERY Unknown   Rpt    NM PET 68GA DOTATATE WHOLE BODY Unknown    Rpt     Result History - Result Edited            External Result Report     External Result Report   Narrative     EXAMINATION:  CT ABDOMEN PELVIS WITH CONTRAST    CLINICAL HISTORY:  elevated lipase    TECHNIQUE:  Routine axial CT images of the abdomen and pelvis were obtained after administration 75cc of IV Omnipaque 350.  Coronal and Sagittal reformatted images were also obtained.    COMPARISON:  Ultrasound 12/12/2018    FINDINGS:  Base of heart is normal in size.  No pericardial effusion.    Minor platelike atelectasis of the left lung base.  No large focal consolidation.  No pleural effusion or pneumothorax.    Liver is normal in size and attenuation.  No focal hepatic lesion.  No gallstones.  No biliary ductal dilatation.  Portal vein is patent.  Spleen and adrenal glands are unremarkable.  Pancreas is unremarkable.  No peripancreatic inflammatory changes.  No pancreatic ductal dilatation.    Kidneys are normal in size and location.  Normal concentration and excretion of contrast.  On the early contrast images there is high density material within the bilateral renal collecting systems which may represent the nonobstructing renal calculi versus early excretion of contrast.  No hydronephrosis.   0.6 cm stone at the distal left ureter.  No significant upstream ureteral dilatation.  Mild scarring of the left kidney with a subcentimeter hypodense lesion in the right kidney.  Urinary bladder is unremarkable.  Prostatic calcifications.    Stomach is unremarkable.  Small and large bowel are normal caliber.  No evidence of bowel obstruction or inflammation.  Appendix is normal caliber without inflammatory changes.    No free intraperitoneal air or fluid. No retroperitoneal or mesenteric adenopathy.    Mild calcific atherosclerosis of the abdominal aorta.  No aneurysm.    Soft tissues of the body wall are unremarkable.    Osseous structures demonstrate age-appropriate degenerative changes.  No acute osseous abnormality.   Impression       1. 0.6 cm stone at the distal left ureter without significant ureteral dilatation.  No hydronephrosis.  2. High density material within the bilateral renal collecting systems which may represent nonobstructing renal calculi versus early excretion of contrast.  3. In this patient with history of elevated lipase, the pancreas is unremarkable in appearance without peripancreatic inflammatory changes.  4. Additional findings as above.  This report was flagged in Epic as abnormal.    Electronically signed by resident: Phil Springer  Date: 01/08/2019  Time: 14:05    Electronically signed by: Mitesh Yousif MD  Date: 01/08/2019  Time: 14:34    Encounter     View Encounter             Signed by Resident     Phil Springer MD MD 1/08/2019        Attestation     As the supervising and teaching physician, I personally reviewed the images and resident interpretation and I agree with the findings.     · Normal left ventricular systolic function. The estimated ejection fraction is 68%  · Normal LV diastolic function.  · No wall motion abnormalities.  · Normal right ventricular systolic function.  · Mild right atrial enlargement.  · Normal central venous pressure (3 mm Hg).  · The estimated  PA systolic pressure is 17 mm Hg      Performing Sonographer     Alexx Madden, Patient Care Assistant        PACS Images      Show images for Upper GI endoscopy   Upper GI endoscopy   Order: [id number]   Status:  Final result   Visible to patient:  Yes (Patient Portal) Next appt:  None      Narrative   Performed by: [facility]   Patient Name: Massimo Funez  Procedure Date: 6/10/2019 12:08 PM  MRN: [id number]  Account Number: [id number]  YOB: 1960  Age: 58  Room: [place] [address]  Gender: Male  Attending MD: Fermin Coley MD  Procedure:            Upper GI endoscopy  Indications:          For therapy of polyps in the duodenum  Providers:            Fermin Coley MD, Preet Mcnally RN,                         Amber Feliciano CRNA, Simona Rivera RN  Referring MD:         Fermin Coley MD  Complications:        No immediate complications.  Medicines:            Monitored Anesthesia Care  Procedure:            After obtaining informed consent, the endoscope was                         passed under direct vision. Throughout the                         procedure, the patient's blood pressure, pulse, and                         oxygen saturations were monitored continuously. The                         Olympus scope GIF- ([id number]) was introduced                         through the mouth, and advanced to the second part                         of duodenum. The upper GI endoscopy was                         accomplished without difficulty.  Findings:       The examined esophagus was normal.       The entire examined stomach was normal.       A single 15 mm submucosal nodule was found in the duodenal bulb.        Preparations were made for mucosal resection. Eleview was injected        to raise the lesion. Snare mucosal resection was performed.        Resection and retrieval were complete. Biopsies were taken with a        cold forceps for histology. To close a defect  after mucosal        resection, five hemostatic clips were successfully placed. There was        no bleeding at the end of the procedure.  Impression:           - Normal esophagus.                        - Normal stomach.                        - Submucosal nodule found in the duodenum. Complete                         removal via EMR was accomplished in two pieces.                         There may have been residual tissue that was                         biopsied. Clips were placed.  Recommendation:       - Discharge patient to home.                        - Full liquid diet for 2 days.                        - Continue present medications.                        - Await pathology results.                        - Refer to [facility] clinic at [place] for further                         management.  Attending Participation:       I personally performed the entire procedure.  Fermin Coley MD  6/10/2019 2:36:28 PM  This report has been verified and signed electronically.  Number of Addenda: 0  Note Initiated On: 6/10/2019 12:08 PM  Estimated Blood Loss: Estimated blood loss: none.       This report has been verified and signed electronicall           Results for SANAZ GUTIERREZ (MRN [id number]) as of 8/5/2019 10:59   Ref. Range 1/11/2019 18:47 1/12/2019 05:03 1/12/2019 10:58 3/25/2019 16:58 5/27/2019 10:21   WBC Latest Ref Range: 3.90 - 12.70 K/uL  10.56 9.35 7.86 5.90   RBC Latest Ref Range: 4.60 - 6.20 M/uL  4.16 (L) 4.43 (L) 4.87 4.49 (L)   Hemoglobin Latest Ref Range: 14.0 - 18.0 g/dL 12.7 (L) 12.7 (L) 13.4 (L) 14.8 13.7 (L)   Hematocrit Latest Ref Range: 40.0 - 54.0 %  37.4 (L) 41.3 44.4 40.6   MCV Latest Ref Range: 82 - 98 fL  90 93 91 90   MCH Latest Ref Range: 27.0 - 31.0 pg  30.5 30.2 30.4 30.5   MCHC Latest Ref Range: 32.0 - 36.0 g/dL  34.0 32.4 33.3 33.7   RDW Latest Ref Range: 11.5 - 14.5 %  13.4 13.4 13.5 13.3   Platelets Latest Ref Range: 150 - 350 K/uL  SEE COMMENT 143 (L) 205 159   MPV  Latest Ref Range: 9.2 - 12.9 fL  SEE COMMENT 11.2 11.7 11.4   Gran% Latest Ref Range: 38.0 - 73.0 %  68.5 76.4 (H) 73.0 47.6   Gran # (ANC) Latest Ref Range: 1.8 - 7.7 K/uL  7.2 7.1 5.8 2.8   Lymph% Latest Ref Range: 18.0 - 48.0 %  24.9 17.6 (L) 20.5 45.8   Lymph # Latest Ref Range: 1.0 - 4.8 K/uL  2.6 1.7 1.6 2.7   Mono% Latest Ref Range: 4.0 - 15.0 %  4.8 5.3 5.9 5.6   Mono # Latest Ref Range: 0.3 - 1.0 K/uL  0.5 0.5 0.5 0.3   Eosinophil% Latest Ref Range: 0.0 - 8.0 %  0.3 0.3 0.0 0.3   Eos # Latest Ref Range: 0.0 - 0.5 K/uL  0.0 0.0 0.0 0.0   Basophil% Latest Ref Range: 0.0 - 1.9 %  0.3 0.2 0.3 0.5   Baso # Latest Ref Range: 0.00 - 0.20 K/uL  0.03 0.02 0.02 0.03   nRBC Latest Ref Range: 0 /100 WBC  0 0 0 0   Ovalocytes Unknown  Occasional      Aniso Unknown  Slight      Poik Unknown  Slight      Poly Unknown  Occasional      Hypo Unknown  Occasional      [place] Cells Unknown  Occasional      Tear Drop Cells Unknown  Occasional      Differential Method Unknown  Automated Automated Automated Automated   Immature Grans (Abs) Latest Ref Range: 0.00 - 0.04 K/uL  0.13 (H) 0.02 0.02 0.01   Immature Granulocytes Latest Ref Range: 0.0 - 0.5 %  1.2 (H) 0.2 0.3 0.2   Sed Rate Latest Ref Range: 0 - 23 mm/Hr     12   Sodium Latest Ref Range: 136 - 145 mmol/L  140   144   Potassium Latest Ref Range: 3.5 - 5.1 mmol/L  3.6   3.6   Chloride Latest Ref Range: 95 - 110 mmol/L  113 (H)   107   CO2 Latest Ref Range: 23 - 29 mmol/L  20 (L)   28   Anion Gap Latest Ref Range: 8 - 16 mmol/L  7 (L)   9   BUN, Bld Latest Ref Range: 6 - 20 mg/dL  12   12   Creatinine Latest Ref Range: 0.5 - 1.4 mg/dL  0.8   0.9   eGFR if non African American Latest Ref Range: >60 mL/min/1.73 m^2  >60.0   >60.0   eGFR if African American Latest Ref Range: >60 mL/min/1.73 m^2  >60.0   >60.0   Glucose Latest Ref Range: 70 - 110 mg/dL  82   86   Calcium Latest Ref Range: 8.7 - 10.5 mg/dL  9.6   10.4   Magnesium Latest Ref Range: 1.6 - 2.6 mg/dL  1.7      Alkaline  Phosphatase Latest Ref Range: 55 - 135 U/L  61   82   PROTEIN TOTAL Latest Ref Range: 6.0 - 8.4 g/dL  5.8 (L)   7.2   Albumin Latest Ref Range: 3.5 - 5.2 g/dL  2.7 (L)   3.8   BILIRUBIN TOTAL Latest Ref Range: 0.1 - 1.0 mg/dL  0.8   0.9   AST Latest Ref Range: 10 - 40 U/L  17   20   ALT Latest Ref Range: 10 - 44 U/L  19   21   Lipase Latest Ref Range: 4 - 60 U/L    46    CRP Latest Ref Range: 0.0 - 8.2 mg/L     5.7       Impression:  58-year-old gentleman diagnosed with duodenal carcinoid tumor.  He underwent endoscopic ultrasound the tumor has been resected.  His gallium scan does not show any uptake at all  Lost about significant weight intentionally and feels good.  Plan:       Have ordered for the tumor markers today.  He is otherwise asymptomatic from the carcinoid tumor.  His tumor has been resected.  At this point he does not need anything will order for the carcinoid tumor markers today.  Will follow up in 6 months time with the repeat markers and repeat endoscopic ultrasound.    I had a long discussion with him and explained about the natural course of the disease history of the disease and the potential issues it can cause about the spread and the growth of the tumor.  And I also discussed in about the rationale of waiting.    I will see him about 6 months time with all the follow-up studies including endoscopic ultrasound.  Patient understands     Follow-up in 6 months time with completed workup          DAYANA Ontiveros MD, FACS   Associate Professor of Surgery, [facility]   Neuroendocrine Surgery, Hepatic/Pancreatic & General Surgery   [address]   [place] [address]   ph. [phone]; [phone]   fax. [phone]

## 2019-08-28 LAB
5-HIAA, PLASMA (NEUROEND): 8 NG/ML
ALBUMIN SERPL-MCNC: 4.5 G/DL (ref 3.6–5.1)
ALBUMIN/GLOB SERPL: 1.5 (CALC) (ref 1–2.5)
ALP SERPL-CCNC: 87 U/L (ref 40–115)
ALT SERPL-CCNC: 15 U/L (ref 9–46)
AST SERPL-CCNC: 19 U/L (ref 10–35)
BASOPHILS # BLD AUTO: 20 CELLS/UL (ref 0–200)
BASOPHILS NFR BLD AUTO: 0.4 %
BILIRUB SERPL-MCNC: 0.7 MG/DL (ref 0.2–1.2)
BUN SERPL-MCNC: 14 MG/DL (ref 7–25)
BUN/CREAT SERPL: ABNORMAL (CALC) (ref 6–22)
CALCIUM SERPL-MCNC: 10.5 MG/DL (ref 8.6–10.3)
CHLORIDE SERPL-SCNC: 102 MMOL/L (ref 98–110)
CO2 SERPL-SCNC: 30 MMOL/L (ref 20–32)
CREAT SERPL-MCNC: 0.99 MG/DL (ref 0.7–1.33)
EOSINOPHIL # BLD AUTO: 31 CELLS/UL (ref 15–500)
EOSINOPHIL NFR BLD AUTO: 0.6 %
ERYTHROCYTE [DISTWIDTH] IN BLOOD BY AUTOMATED COUNT: 12.5 % (ref 11–15)
GFRSERPLBLD MDRD-ARVRAT: 84 ML/MIN/1.73M2
GLOBULIN SER CALC-MCNC: 3 G/DL (CALC) (ref 1.9–3.7)
GLUCOSE SERPL-MCNC: 143 MG/DL (ref 65–139)
HCT VFR BLD AUTO: 44.1 % (ref 38.5–50)
HGB BLD-MCNC: 15.1 G/DL (ref 13.2–17.1)
LYMPHOCYTES # BLD AUTO: 1994 CELLS/UL (ref 850–3900)
LYMPHOCYTES NFR BLD AUTO: 39.1 %
MCH RBC QN AUTO: 31.7 PG (ref 27–33)
MCHC RBC AUTO-ENTMCNC: 34.2 G/DL (ref 32–36)
MCV RBC AUTO: 92.6 FL (ref 80–100)
MONOCYTES # BLD AUTO: 332 CELLS/UL (ref 200–950)
MONOCYTES NFR BLD AUTO: 6.5 %
NEUROKININ A: NORMAL PG/ML
NEUTROPHILS # BLD AUTO: 2723 CELLS/UL (ref 1500–7800)
NEUTROPHILS NFR BLD AUTO: 53.4 %
PANCREASTATIN: 43 PG/ML (ref 10–135)
PLATELET # BLD AUTO: 143 THOUSAND/UL (ref 140–400)
PMV BLD REES-ECKER: 11.7 FL (ref 7.5–12.5)
POTASSIUM SERPL-SCNC: 3.9 MMOL/L (ref 3.5–5.3)
PROT SERPL-MCNC: 7.5 G/DL (ref 6.1–8.1)
RBC # BLD AUTO: 4.76 MILLION/UL (ref 4.2–5.8)
SEROTONIN SER-MCNC: 114 NG/ML (ref 56–244)
SODIUM SERPL-SCNC: 146 MMOL/L (ref 135–146)
WBC # BLD AUTO: 5.1 THOUSAND/UL (ref 3.8–10.8)

## 2019-10-01 ENCOUNTER — PATIENT MESSAGE (OUTPATIENT)
Dept: RHEUMATOLOGY | Facility: CLINIC | Age: 59
End: 2019-10-01

## 2019-10-01 ENCOUNTER — PATIENT MESSAGE (OUTPATIENT)
Dept: INTERNAL MEDICINE | Facility: CLINIC | Age: 59
End: 2019-10-01

## 2019-10-01 RX ORDER — HYDROCHLOROTHIAZIDE 25 MG/1
25 TABLET ORAL DAILY
Qty: 90 TABLET | Refills: 3 | Status: SHIPPED | OUTPATIENT
Start: 2019-10-01

## 2019-10-01 RX ORDER — PREDNISONE 2.5 MG/1
5 TABLET ORAL DAILY
Qty: 60 TABLET | Refills: 3 | Status: SHIPPED | OUTPATIENT
Start: 2019-10-01 | End: 2019-10-31

## 2019-10-01 RX ORDER — PROMETHAZINE HYDROCHLORIDE 25 MG/1
25 TABLET ORAL EVERY 6 HOURS PRN
Qty: 20 TABLET | Refills: 0 | OUTPATIENT
Start: 2019-10-01

## 2020-01-07 ENCOUNTER — PATIENT MESSAGE (OUTPATIENT)
Dept: NEUROLOGY | Facility: HOSPITAL | Age: 60
End: 2020-01-07

## 2020-01-07 ENCOUNTER — TELEPHONE (OUTPATIENT)
Dept: NEUROLOGY | Facility: HOSPITAL | Age: 60
End: 2020-01-07

## 2020-01-07 NOTE — TELEPHONE ENCOUNTER
Mr Funez this is [facility] nurse for Dr. Ontiveros. I rescheduled your CT for the Friday 2/14/20 at 915 am. But Dr. Solo only sees pt on Monday's and Thursdays. Is it possible for you to see him on 2/17/20. If so I can reschedule for that am. Please let me know

## 2020-02-17 ENCOUNTER — TELEPHONE (OUTPATIENT)
Dept: NEUROLOGY | Facility: HOSPITAL | Age: 60
End: 2020-02-17
Payer: MEDICARE

## 2020-07-17 DIAGNOSIS — Z71.89 COMPLEX CARE COORDINATION: ICD-10-CM

## 2020-10-05 ENCOUNTER — PATIENT MESSAGE (OUTPATIENT)
Dept: ADMINISTRATIVE | Facility: HOSPITAL | Age: 60
End: 2020-10-05

## 2021-01-04 ENCOUNTER — PATIENT MESSAGE (OUTPATIENT)
Dept: ADMINISTRATIVE | Facility: HOSPITAL | Age: 61
End: 2021-01-04

## 2021-04-05 ENCOUNTER — PATIENT MESSAGE (OUTPATIENT)
Dept: ADMINISTRATIVE | Facility: HOSPITAL | Age: 61
End: 2021-04-05

## 2021-07-06 ENCOUNTER — PATIENT MESSAGE (OUTPATIENT)
Dept: ADMINISTRATIVE | Facility: HOSPITAL | Age: 61
End: 2021-07-06

## 2021-10-04 ENCOUNTER — PATIENT MESSAGE (OUTPATIENT)
Dept: ADMINISTRATIVE | Facility: HOSPITAL | Age: 61
End: 2021-10-04

## 2022-02-22 ENCOUNTER — TELEPHONE (OUTPATIENT)
Dept: NEUROLOGY | Facility: HOSPITAL | Age: 62
End: 2022-02-22
Payer: MEDICARE

## 2022-02-22 NOTE — TELEPHONE ENCOUNTER
----- Message from Latonia Perez sent at 2/22/2022 12:14 PM CST -----  Contact: [phone]  Type:  Needs Medical Advice    Who Called: Dr. Webb from gastro clinic in [place]  Would the patient rather a call back or a response via [facility]? Call back  Best Call Back Number: [phone]  Additional Information: Needs to know what labs were done on pt last time he was here. Please kodak to advice

## 2023-11-29 NOTE — HPI
Mr. Funez is a 59yo man with ALBARADO, RA on prednisone and etanercept, and history of nephrolithiasis who presents to the ED with 1 day of nausea and loose diarrhea. He states that yesterday evening he started experiencing some mild nausea without emesis and began having watery diarrhea. Reports multiple episodes of loose, watery yellow stool. Non-bloody, no melena. No associated abdominal pain; although he does report some vague discomfort. Diarrhea has continued this am. Otherwise, he is afebrile and with no other acute complaints. States he has been eating healthier lately after being diagnosed with ALBARADO and has stopped drinking and smoking. Did note episode of reflux 3d ago, but otherwise has been in normal state of health.    He recently started etanercept for his RA and has taken one dose so far on 12/20; additional doses were held due to concern that he had a cold and his wife had the flu so he did not want to risk an infection.   157.48

## 2024-03-27 ENCOUNTER — OV NP (OUTPATIENT)
Dept: URBAN - METROPOLITAN AREA CLINIC 25 | Facility: CLINIC | Age: 64
End: 2024-03-27

## 2024-04-16 ENCOUNTER — OV NP (OUTPATIENT)
Dept: URBAN - METROPOLITAN AREA CLINIC 25 | Facility: CLINIC | Age: 64
End: 2024-04-16
Payer: MEDICARE

## 2024-04-16 VITALS
DIASTOLIC BLOOD PRESSURE: 81 MMHG | WEIGHT: 257.4 LBS | HEIGHT: 70 IN | TEMPERATURE: 99.7 F | SYSTOLIC BLOOD PRESSURE: 129 MMHG | BODY MASS INDEX: 36.85 KG/M2 | HEART RATE: 55 BPM

## 2024-04-16 DIAGNOSIS — D3A.092 BENIGN CARCINOID TUMOR OF STOMACH: ICD-10-CM

## 2024-04-16 DIAGNOSIS — Z86.010 PERSONAL HISTORY OF COLON POLYPS: ICD-10-CM

## 2024-04-16 PROCEDURE — 99203 OFFICE O/P NEW LOW 30 MIN: CPT | Performed by: INTERNAL MEDICINE

## 2024-04-16 RX ORDER — POLYETHYLENE GLYCOL 3350, SODIUM CHLORIDE, SODIUM BICARBONATE, POTASSIUM CHLORIDE 420; 11.2; 5.72; 1.48 G/4L; G/4L; G/4L; G/4L
AS DIRECTED POWDER, FOR SOLUTION ORAL ONCE
Qty: 1 BOTTLE | Refills: 0 | OUTPATIENT
Start: 2024-04-16 | End: 2024-04-17

## 2024-04-16 NOTE — HPI-TODAY'S VISIT:
[facility].  A copy of this will be sent to the referring provider. Overall the patient has been feeling well.  The patient denies anorexia or weight loss.  In general the patient has regular BM's.  In general the patient denies constipation, diarrhea, hematochezia, BRBPR, or melena.  The patient denies abdominal pain.  The patient denies GERD/N/V/dysphagia. His GERD is controlled on Omeprazole daily.  There is no FHx of colon cancer.  The patient has had prior colonoscopy in 2017.  He ahd polyps and was told to repeat it in 5 years

## 2024-05-21 ENCOUNTER — OFFICE VISIT (OUTPATIENT)
Dept: URBAN - METROPOLITAN AREA SURGERY CENTER 20 | Facility: SURGERY CENTER | Age: 64
End: 2024-05-21
Payer: MEDICARE

## 2024-05-21 ENCOUNTER — TELEPHONE ENCOUNTER (OUTPATIENT)
Dept: URBAN - METROPOLITAN AREA CLINIC 84 | Facility: CLINIC | Age: 64
End: 2024-05-21

## 2024-05-21 ENCOUNTER — CLAIMS CREATED FROM THE CLAIM WINDOW (OUTPATIENT)
Dept: URBAN - METROPOLITAN AREA CLINIC 4 | Facility: CLINIC | Age: 64
End: 2024-05-21
Payer: MEDICARE

## 2024-05-21 DIAGNOSIS — D12.3 ADENOMA OF TRANSVERSE COLON: ICD-10-CM

## 2024-05-21 DIAGNOSIS — Z86.010 ADENOMAS PERSONAL HISTORY OF COLONIC POLYPS: ICD-10-CM

## 2024-05-21 DIAGNOSIS — D12.3 BENIGN NEOPLASM OF TRANSVERSE COLON: ICD-10-CM

## 2024-05-21 PROCEDURE — 88305 TISSUE EXAM BY PATHOLOGIST: CPT | Performed by: PATHOLOGY

## 2024-05-21 PROCEDURE — 45380 COLONOSCOPY AND BIOPSY: CPT | Performed by: INTERNAL MEDICINE

## 2024-05-21 PROCEDURE — 45385 COLONOSCOPY W/LESION REMOVAL: CPT | Performed by: INTERNAL MEDICINE

## 2024-05-21 PROCEDURE — G8907 PT DOC NO EVENTS ON DISCHARG: HCPCS | Performed by: INTERNAL MEDICINE
